# Patient Record
Sex: FEMALE | Employment: FULL TIME | URBAN - METROPOLITAN AREA
[De-identification: names, ages, dates, MRNs, and addresses within clinical notes are randomized per-mention and may not be internally consistent; named-entity substitution may affect disease eponyms.]

---

## 2017-01-02 ENCOUNTER — THERAPY VISIT (OUTPATIENT)
Dept: PHYSICAL THERAPY | Facility: CLINIC | Age: 47
End: 2017-01-02
Payer: COMMERCIAL

## 2017-01-02 DIAGNOSIS — M25.512 LEFT SHOULDER PAIN: Primary | ICD-10-CM

## 2017-01-02 PROCEDURE — 97112 NEUROMUSCULAR REEDUCATION: CPT | Mod: GP | Performed by: PHYSICAL THERAPIST

## 2017-01-02 PROCEDURE — 97161 PT EVAL LOW COMPLEX 20 MIN: CPT | Mod: GP | Performed by: PHYSICAL THERAPIST

## 2017-01-02 PROCEDURE — 97110 THERAPEUTIC EXERCISES: CPT | Mod: GP | Performed by: PHYSICAL THERAPIST

## 2017-01-02 NOTE — PROGRESS NOTES
[facility] Initial Evaluation      Subjective:    Deepa Anderson is a 46 year old female with a left shoulder condition.  Condition occurred with:  Unknown cause.  Condition occurred: for unknown reasons.  This is a new condition  11-15-16 pt had onset of L shoulder pain.    Patient reports pain:  Lateral and in the joint.  Radiates to:  Upper arm.  Pain is described as aching and is intermittent and reported as 3/10.  Associated symptoms:  Catching, loss of motion/stiffness and loss of strength. Pain is the same all the time.  Symptoms are exacerbated by using arm overhead, lifting, certain positions and lying on extremity and relieved by nothing.  Since onset symptoms are unchanged.        General health as reported by patient is good.  Pertinent medical history includes:  Overweight and smoking.  Medical allergies: no.  Other surgeries include:  Other (c section x2, pilonidal cyst, gallbladder).  Current medications:  None as reported by the patient.  Current occupation is .  Patient is working in normal job without restrictions.  Primary job tasks include:  Prolonged sitting.    Barriers include:  None as reported by the patient.    Red flags:  None as reported by the patient.      SHOULDER:    Cervical Screen: normal and painfree with tight B upper traps with some tenderness to palpation    Shoulder:   PROM L PROM R AROM L AROM R MMT L MMT R   Flex   180 180 4/5pain 5/5   Abd   125 185 4/5pain 5/5   Full Can     4/5pain 5/5   Empty Can     NT pain 5/5   IR   T8 T4 5/5 5/5   ER   78 84 4/5 5/5   Ext/IR           Scapulothoraic Rhythm: increased upward rotation on L vs R    Palpation: no TTP    Special tests:   L R   Impingement     Neer's + +   Hawkin's-Jim + neg   Coracoid Impingement     Internal impingement     Labral     Anterior Slide     Woodson's     Crank     Instability     Apprehension (anterior) neg neg   Relocation (anterior)     Anterior Load & Shift      Posterior Load & Shift     Posterior instability (with 90 degrees flex)     Multi-Directional Instability      Sulcus     Biceps      Speed's     Rotator Cuff Tear     Drop Arm neg neg   Belly Press neg neg   Lift off  + neg     GH Mobility  L  R   Posterior glide wnl wnl   Inferior glide wnl wnl   Anterior glide wnl wnl     Plan to address ROM limitations of shoulder and neck, strengthen periscapular and RTC, address posture.                      Objective:    System    Physical Exam    General     ROS    Assessment/Plan:      Patient is a 46 year old female with left side shoulder complaints.    Patient has the following significant findings with corresponding treatment plan.                Diagnosis 1:  L shoulder pain  Pain -  hot/cold therapy, manual therapy, education and home program  Decreased ROM/flexibility - manual therapy, therapeutic exercise, therapeutic activity and home program  Decreased strength - therapeutic exercise, therapeutic activities and home program  Impaired posture - neuro re-education, therapeutic activities and home program    Therapy Evaluation Codes:   1) History comprised of:   Personal factors that impact the plan of care:      None.    Comorbidity factors that impact the plan of care are:      Overweight and Smoking.     Medications impacting care: None.  2) Examination of Body Systems comprised of:   Body structures and functions that impact the plan of care:      Shoulder.   Activity limitations that impact the plan of care are:      Dressing, Lifting and Sleeping.   Clinical presentation characteristics are:    Stable/Uncomplicated.  3) Presentation comprised of:   Presentation scored as Low complexity with uncomplicated characteristics..  4) Decision-Making    Low complexity using standardized patient assessment instrument and/or measureable assessment of functional outcome.  Cumulative Therapy Evaluation is: Low complexity.    Previous and current functional limitations:   (See Goal Flow Sheet for this information)    Short term and Long term goals: (See Goal Flow Sheet for this information)     Communication ability:  Patient appears to be able to clearly communicate and understand verbal and written communication and follow directions correctly.  Treatment Explanation - The following has been discussed with the patient:   RX ordered/plan of care  Anticipated outcomes  Possible risks and side effects  This patient would benefit from PT intervention to resume normal activities.   Rehab potential is good.    Frequency:  1 X week, once daily  Duration:  for 8 weeks  Discharge Plan:  Achieve all LTG.  Independent in home treatment program.  Return to previous functional level by discharge.  Reach maximal therapeutic benefit.    Please refer to the daily flowsheet for treatment today, total treatment time and time spent performing 1:1 timed codes.

## 2017-01-12 ENCOUNTER — THERAPY VISIT (OUTPATIENT)
Dept: PHYSICAL THERAPY | Facility: CLINIC | Age: 47
End: 2017-01-12
Payer: COMMERCIAL

## 2017-01-12 DIAGNOSIS — M25.512 LEFT SHOULDER PAIN: Primary | ICD-10-CM

## 2017-01-12 PROCEDURE — 97110 THERAPEUTIC EXERCISES: CPT | Mod: GP | Performed by: PHYSICAL THERAPIST

## 2017-01-16 ENCOUNTER — THERAPY VISIT (OUTPATIENT)
Dept: PHYSICAL THERAPY | Facility: CLINIC | Age: 47
End: 2017-01-16
Payer: COMMERCIAL

## 2017-01-16 DIAGNOSIS — M25.512 LEFT SHOULDER PAIN: Primary | ICD-10-CM

## 2017-01-16 PROCEDURE — 97110 THERAPEUTIC EXERCISES: CPT | Mod: GP | Performed by: PHYSICAL THERAPIST

## 2017-01-16 PROCEDURE — 97112 NEUROMUSCULAR REEDUCATION: CPT | Mod: GP | Performed by: PHYSICAL THERAPIST

## 2017-01-23 ENCOUNTER — THERAPY VISIT (OUTPATIENT)
Dept: PHYSICAL THERAPY | Facility: CLINIC | Age: 47
End: 2017-01-23
Payer: COMMERCIAL

## 2017-01-23 DIAGNOSIS — M25.512 LEFT SHOULDER PAIN: Primary | ICD-10-CM

## 2017-01-23 PROCEDURE — 97112 NEUROMUSCULAR REEDUCATION: CPT | Mod: GP | Performed by: PHYSICAL THERAPIST

## 2017-01-23 PROCEDURE — 97110 THERAPEUTIC EXERCISES: CPT | Mod: GP | Performed by: PHYSICAL THERAPIST

## 2017-03-24 PROBLEM — M25.512 LEFT SHOULDER PAIN: Status: RESOLVED | Noted: 2017-01-02 | Resolved: 2017-03-24

## 2017-03-24 NOTE — PROGRESS NOTES
"Subjective:    HPI                    Objective:    System    Physical Exam    General     ROS    Assessment/Plan:      DISCHARGE REPORT    Progress reporting period is from 1/2/17 to 1/23/17.     SUBJECTIVE  Subjective: pt reports shoulder is pretty good, was able to lift a \"tote\" overhead weighing approx 15# without pain.   Current Pain level: 2/10   Initial Pain level: 3/10   Changes in function: Yes, see goal flow sheet for change in function   Adverse reactions: None;   ,     The objective findings are from DOS 1/23/17.    OBJECTIVE  Objective: progressed periscapular exercises and added supine cuff exercises      ASSESSMENT/PLAN  Updated problem list and treatment plan: Diagnosis 1:  Shoulder pain   STG/LTGs have been met or progress has been made towards goals:  Yes, is able to reach painfree.  Assessment of Progress: The patient's condition is improving.  Self Management Plans:  Patient is independent in a home treatment program.  [facility] continues to require the following intervention to meet STG and LTG's: PT intervention is no longer required to meet STG/LTG.  We will discharge this patient from PT.    Recommendations:  This patient is ready to be discharged from therapy and continue their home treatment program.    Please refer to the daily flowsheet for treatment today, total treatment time and time spent performing 1:1 timed codes.          "

## 2017-04-12 ENCOUNTER — OFFICE VISIT (OUTPATIENT)
Dept: FAMILY MEDICINE | Facility: CLINIC | Age: 47
End: 2017-04-12
Payer: COMMERCIAL

## 2017-04-12 ENCOUNTER — RADIANT APPOINTMENT (OUTPATIENT)
Dept: ULTRASOUND IMAGING | Facility: CLINIC | Age: 47
End: 2017-04-12
Attending: PHYSICIAN ASSISTANT
Payer: COMMERCIAL

## 2017-04-12 VITALS
OXYGEN SATURATION: 98 % | TEMPERATURE: 97.8 F | SYSTOLIC BLOOD PRESSURE: 136 MMHG | HEART RATE: 82 BPM | DIASTOLIC BLOOD PRESSURE: 88 MMHG

## 2017-04-12 DIAGNOSIS — R22.1 MASS OF LEFT SIDE OF NECK: ICD-10-CM

## 2017-04-12 DIAGNOSIS — R22.1 MASS OF LEFT SIDE OF NECK: Primary | ICD-10-CM

## 2017-04-12 PROCEDURE — 76536 US EXAM OF HEAD AND NECK: CPT

## 2017-04-12 PROCEDURE — 99213 OFFICE O/P EST LOW 20 MIN: CPT | Performed by: PHYSICIAN ASSISTANT

## 2017-04-12 NOTE — PROGRESS NOTES
SUBJECTIVE:                                                    Deepa Anderson is a 46 year old female who presents to clinic today for the following health issues:      Lump on left side neck, notice it yesterday.   It is not painful. She has not been sick with URI symptoms.   Since it has been such a short period of time, she is not sure if there has been change.       Problem list and histories reviewed & adjusted, as indicated.  Additional history: as documented    Patient Active Problem List   Diagnosis     History of dysplastic nevus     Acne vulgaris     Obesity     CARDIOVASCULAR SCREENING; LDL GOAL LESS THAN 160     H. pylori infection     Diarrhea     Tubular adenoma of colon     Abdominal pain, epigastric     Gallstones     S/P laparoscopic cholecystectomy     Tobacco use disorder     Past Surgical History:   Procedure Laterality Date     ABDOMEN SURGERY   &      births     BIOPSY  2015    polyp removed     C  DELIVERY ONLY      x2/'02,'06     CHOLECYSTECTOMY  06/15/2016     COLONOSCOPY  2015     CYSTECTOMY PILONIDAL  '05     SOFT TISSUE SURGERY      Pilonidal cyst surgery     TUBAL LIGATION  '06       Social History   Substance Use Topics     Smoking status: Current Every Day Smoker     Packs/day: 0.25     Years: 25.00     Types: Cigarettes     Smokeless tobacco: Never Used     Alcohol use Yes      Comment: 4-5 drinks per week     Family History   Problem Relation Age of Onset     Arthritis Mother      Lipids Mother      Cardiovascular Father      CANCER No family hx of      Glaucoma No family hx of      Macular Degeneration No family hx of      DIABETES No family hx of      Hypertension Mother      Hypertension Father      CEREBROVASCULAR DISEASE Father      Obesity Mother      Thyroid Disease Mother      Hypertension Brother      Hypertension Brother          Current Outpatient Prescriptions   Medication Sig Dispense Refill     omeprazole (PRILOSEC) 20 MG  capsule Take 1 capsule (20 mg) by mouth daily 90 capsule 1     Multiple Vitamin (MULTI-VITAMIN PO) Take  by mouth. Takes 1 tab daily       ibuprofen (ADVIL,MOTRIN) 200 MG tablet Take 200 mg by mouth every 4 hours as needed.       Allergies   Allergen Reactions     Percodan [Aspirin]        Reviewed and updated as needed this visit by clinical staff  Allergies  Meds       Reviewed and updated as needed this visit by Provider         ROS:  C: NEGATIVE for fever, chills, change in weight  INTEGUMENTARY/SKIN: NEGATIVE for worrisome rashes, moles or lesions  E/M: NEGATIVE for ear, mouth and throat problems  R: NEGATIVE for significant cough or SOB  CV: NEGATIVE for chest pain, palpitations or peripheral edema  GI: NEGATIVE for nausea, abdominal pain, heartburn, or change in bowel habits  MUSCULOSKELETAL: NEGATIVE for significant arthralgias or myalgia  NEURO: NEGATIVE for weakness, dizziness or paresthesias  ENDOCRINE: NEGATIVE for temperature intolerance, skin/hair changes  PSYCHIATRIC: NEGATIVE for changes in mood or affect    OBJECTIVE:                                                    /88 (Cuff Size: Adult Large)  Pulse 82  Temp 97.8  F (36.6  C) (Oral)  SpO2 98%  There is no height or weight on file to calculate BMI.  GENERAL: healthy, alert and no distress  EYES: Eyes grossly normal to inspection, PERRL and conjunctivae and sclerae normal  NECK: no adenopathy, mass in the left anterior neck, along the sternocleidomastoid, nontender, but at least 1 cm in diameter, but borders are difficult to define. and thyroid normal to palpation  MS: no gross musculoskeletal defects noted, no edema  SKIN: no suspicious lesions or rashes  PSYCH: mentation appears normal, affect normal/bright         ASSESSMENT/PLAN:                                                      1. Mass of left side of neck  Get an ultrasound to define.   She will schedule this on her own, contact information was given today.   Notify if any  changes.   - US Head Neck Soft Tissue; Future      Kristen M. Kehr, PA-C  [facility]

## 2017-04-12 NOTE — NURSING NOTE
"Chief Complaint   Patient presents with     Mass     lump on left side of neck        Initial /88 (Cuff Size: Adult Large)  Pulse 82  Temp 97.8  F (36.6  C) (Oral)  SpO2 98% Estimated body mass index is 38.63 kg/(m^2) as calculated from the following:    Height as of 12/20/16: 5' 11\" (1.803 m).    Weight as of 12/20/16: 277 lb (125.6 kg).  Medication Reconciliation: complete    ASAD Cotto MA    "

## 2017-04-12 NOTE — MR AVS SNAPSHOT
After Visit Summary   4/12/2017    Deepa Anderson    MRN: [id number]           Patient Information     Date Of Birth          1970        Visit Information        Provider Department      4/12/2017 11:50 AM Kehr, Kristen M, PA-C [facility]        Today's Diagnoses     Mass of left side of neck    -  1      Care Instructions    Contact [facility] [phone] to schedule appointment for ultrasound            Follow-ups after your visit        Future tests that were ordered for you today     Open Future Orders        Priority Expected Expires Ordered    US Head Neck Soft Tissue Routine 7/11/2017 4/12/2018 4/12/2017            Who to contact     If you have questions or need follow up information about today's clinic visit or your schedule please contact [facility] directly at [phone].  Normal or non-critical lab and imaging results will be communicated to you by MyChart, letter or phone within 4 business days after the clinic has received the results. If you do not hear from us within 7 days, please contact the clinic through MyChart or phone. If you have a critical or abnormal lab result, we will notify you by phone as soon as possible.  Submit refill requests through [facility] or call your pharmacy and they will forward the refill request to us. Please allow 3 business days for your refill to be completed.          Additional Information About Your Visit        MyChart Information     [facility] gives you secure access to your electronic health record. If you see a primary care provider, you can also send messages to your care team and make appointments. If you have questions, please call your primary care clinic.  If you do not have a primary care provider, please call [phone] and they will assist you.        Care EveryWhere ID     This is your Care EveryWhere ID. This could be used by other organizations to access your [facility]  records  [id number]        Your Vitals Were     Pulse Temperature Pulse Oximetry             82 97.8  F (36.6  C) (Oral) 98%          Blood Pressure from Last 3 Encounters:   04/12/17 136/88   12/20/16 112/77   06/29/16 134/83    Weight from Last 3 Encounters:   12/20/16 277 lb (125.6 kg)   06/29/16 272 lb (123.4 kg)   06/28/16 276 lb (125.2 kg)               Primary Care Provider Office Phone # Fax #    Kristen M Kehr, PA-C [phone]       [facility] [address] [facility] [address]        Thank you!     Thank you for choosing [facility]  for your care. Our goal is always to provide you with excellent care. Hearing back from our patients is one way we can continue to improve our services. Please take a few minutes to complete the written survey that you may receive in the mail after your visit with us. Thank you!             Your Updated Medication List - Protect others around you: Learn how to safely use, store and throw away your medicines at www.disposemymeds.org.          This list is accurate as of: 4/12/17 12:28 PM.  Always use your most recent med list.                   Brand Name Dispense Instructions for use    ibuprofen 200 MG tablet    ADVIL/MOTRIN     Take 200 mg by mouth every 4 hours as needed.       MULTI-VITAMIN PO      Take  by mouth. Takes 1 tab daily       omeprazole 20 MG CR capsule    priLOSEC    90 capsule    Take 1 capsule (20 mg) by mouth daily

## 2017-04-13 ENCOUNTER — TELEPHONE (OUTPATIENT)
Dept: FAMILY MEDICINE | Facility: CLINIC | Age: 47
End: 2017-04-13

## 2017-04-13 DIAGNOSIS — R22.1 MASS IN NECK: Primary | ICD-10-CM

## 2017-04-13 NOTE — TELEPHONE ENCOUNTER
Nursing,   Please contact Deepa and let her know that the ultrasound of her neck showed the mass is either a complex cyst or possibly a lymph node.   There was no other enlarged lymph nodes in her neck. I am going to have her make an appointment with the General Surgeon to determine if they would like to biopsy or get an MRI or CT to look at this further to help define. I recommend that she see Dr. Pnadey in General Surgery. I know he has a lot of experience working in the neck because he does thyroid surgeries. Please help her get an appointment. I have the referral in place. Kristen Kehr PA-C            Results for orders placed or performed in visit on 04/12/17   US Head Neck Soft Tissue    Narrative    ULTRASOUND  HEAD AND NECK SOFT TISSUE  4/12/2017 2:43 PM    HISTORY: Mass on left side of anterior neck. Please define. Localized  swelling, mass and lump, neck.    COMPARISONS:  None.    FINDINGS: Limited ultrasound of the left side of the neck demonstrates  a hypoechoic mass or complex cyst measuring 3.5 x 3.1 x 1.5 cm in the  submandibular region corresponding with the patient's palpable  finding. This does not have a fatty hilum nor does it have a central  vascular stalk of blood supply. There is peripheral vascularity. No  other evidence for lymphadenopathy is identified.      Impression    IMPRESSION: Hypoechoic masslike lesion in the left side of the neck  corresponding with palpable finding has a peripheral vascular supply,  does not have a hypoechoic hilum nor does it have a central blood  supply. This could represent a neoplastic enlarged lymph node. Complex  cyst is also possible. Recommend clinical correlation. Tissue sampling  may be necessary. CT or MRI of the neck may be helpful.    CARITO ORTIZ MD

## 2017-04-13 NOTE — TELEPHONE ENCOUNTER
Patient/parent is informed of MD note below, as it is written. Verbalized good understanding.  Phone number is provided to call and schedule an appointment.  Verbalized good understanding.   Shireen Short RN

## 2017-04-17 ENCOUNTER — OFFICE VISIT (OUTPATIENT)
Dept: SURGERY | Facility: CLINIC | Age: 47
End: 2017-04-17
Payer: COMMERCIAL

## 2017-04-17 VITALS
WEIGHT: 283 LBS | SYSTOLIC BLOOD PRESSURE: 134 MMHG | RESPIRATION RATE: 18 BRPM | HEIGHT: 71 IN | HEART RATE: 69 BPM | BODY MASS INDEX: 39.62 KG/M2 | DIASTOLIC BLOOD PRESSURE: 85 MMHG | OXYGEN SATURATION: 99 %

## 2017-04-17 DIAGNOSIS — R59.1 LYMPHADENOPATHY: Primary | ICD-10-CM

## 2017-04-17 PROCEDURE — 99214 OFFICE O/P EST MOD 30 MIN: CPT | Performed by: SURGERY

## 2017-04-17 ASSESSMENT — PAIN SCALES - GENERAL: PAINLEVEL: NO PAIN (0)

## 2017-04-17 NOTE — PROGRESS NOTES
I was asked to see Deepa Anderson regarding left neck mass by Kehr, Kristen M     Deepa Anderson is a 46 year old female with a left neck mass that she noticed recently. This was found on palpation. The patient reports that there has been  growth recently. There are not associated symptoms of dysphagia, dysphonia, dystonia.  An ultrasound on 17 revealed a hypoechoic neck mass. Pt smokes 1/2 pack/day for 30+ years. Alcohol 5-6 beers per week. Previous abnormal mole on hand and back. Non were cancerous. No other symptoms associated with the mass.     Past Medical History:   has a past medical history of Acne; Actinic keratosis (2010); History of atypical/dysplastic nevus; and Obesity.    Past Surgical History:  Past Surgical History:   & : ABDOMEN SURGERY      Comment:  births  2015: BIOPSY      Comment: polyp removed  No date: C  DELIVERY ONLY      Comment: x2/'02,'06  06/15/2016: CHOLECYSTECTOMY  2015: COLONOSCOPY  '05: CYSTECTOMY PILONIDAL  2005: SOFT TISSUE SURGERY      Comment: Pilonidal cyst surgery  '06: TUBAL LIGATION     Social History:  Social History    Marital status:              Spouse name:                       Years of education:                 Number of children:               Occupational History  Occupation          Employer            Comment                                   [employer]      Social History Main Topics    Smoking status: Current Every Day Smoker                                                     Packs/day: 0.25      Years: 25.00          Types: Cigarettes    Smokeless status: Never Used                        Alcohol use: Yes                Comment: 4-5 drinks per week    Drug use: No              Sexual activity: Yes               Partners with: Male       Birth control/protection: None       Comment: Tubes tied     Other Topics            Concern  Parent/sibling w/ CABG* No    Social History Narrative    None on  "file         Family History:  Review of patient's family history indicates:    Arthritis                      Mother                    Lipids                         Mother                    Cardiovascular                 Father                    CANCER                         No family hx of           Glaucoma                       No family hx of           Macular Degeneration           No family hx of           DIABETES                       No family hx of           Hypertension                   Mother                    Hypertension                   Father                    CEREBROVASCULAR DISEASE        Father                    Obesity                        Mother                    Thyroid Disease                Mother                    Hypertension                   Brother                   Hypertension                   Brother                     ROS:  General: negative for fever or chills  Skin: negative except mass noted above  Ears/Nose/Throat: negative for, epistaxis, bleeding gums  Respiratory: No shortness of breath, dyspnea on exertion, cough, or hemoptysis  Cardiovascular: negative for and chest pain  Gastrointestinal: negative for, nausea, vomiting and abdominal pain  Genitourinary: negative for and frequency  Neurologic: negative for and local weakness  Hematologic/Lymphatic/Immunologic: negative for, bleeding disorder and frequent infections  Endocrine: negative for, diabetes, polydipsia and polyuria    PHYSICAL EXAMINATION: A comprehensive head and neck examination was performed. Of note, there was not a palpable enlargement of the thyroid gland. There were no other palpable masses or adenopathy.    Vitals: /85 (BP Location: Right arm, Patient Position: Chair, Cuff Size: Adult Large)  Pulse 69  Resp 18  Ht 1.803 m (5' 11\")  Wt 128.4 kg (283 lb)  SpO2 99%  BMI 39.47 kg/m2  BMI= Body mass index is 39.47 kg/(m^2).  Constitutional: healthy, alert and no distress  Head: " Normocephalic. No masses, lesions, tenderness or abnormalities  Neck: Neck supple. No adenopathy. Thyroid symmetric, normal size, Carotids without bruits.  ENT: Mucous membranes moist, no oral lesions or masses noted, no nasal lesions or masses noted, left cervical lymphadenopathy noted.  Respiratory:  Non-labored respiration  Musculoskeletal: No gross deformities  Neurologic: No focal deficits  Psychiatric: mentation appears normal and affect normal/bright  Hematologic/Lymphatic/Immunologic: No bruising noted    US:  3.5 x 3.1 x 1.5 cm  Hypoechoic masslike lesion in the left side of the neck corresponding with palpable finding has a peripheral vascular supply, does not have a hypoechoic hilum nor does it have a central blood supply. This could represent a neoplastic enlarged lymph node. Complex cyst is also possible. Recommend clinical correlation. Tissue sampling may be necessary. CT or MRI of the neck may be helpful.    IMPRESSION: Left neck mass    RECOMMENDATION:   CT scan  Thyroid US  Discussed surgery vs FNA    Thank you for entrusting me with the care of your patient. If you have any questions or concerns I hope you will contact me.

## 2017-04-17 NOTE — MR AVS SNAPSHOT
"              After Visit Summary   4/17/2017    Deepa Anderson    MRN: [id number]           Patient Information     Date Of Birth          1970        Visit Information        Provider Department      4/17/2017 1:30 PM Ino Pandey, DO [facility]        Today's Diagnoses     Lymphadenopathy    -  1       Follow-ups after your visit        Who to contact     If you have questions or need follow up information about today's clinic visit or your schedule please contact [facility] directly at [phone].  Normal or non-critical lab and imaging results will be communicated to you by [employer], letter or phone within 4 business days after the clinic has received the results. If you do not hear from us within 7 days, please contact the clinic through [employer] Y[employer] or phone. If you have a critical or abnormal lab result, we will notify you by phone as soon as possible.  Submit refill requests through [employer]MLS or call your pharmacy and they will forward the refill request to us. Please allow 3 business days for your refill to be completed.          Additional Information About Your Visit        MyChart Information     Ret[employer] gives you secure access to your electronic health record. If you see a primary care provider, you can also send messages to your care team and make appointments. If you have questions, please call your primary care clinic.  If you do not have a primary care provider, please call [phone] and they will assist you.        Care EveryWhere ID     This is your Care EveryWhere ID. This could be used by other organizations to access your [place] medical records  [id number]        Your Vitals Were     Pulse Respirations Height Pulse Oximetry BMI (Body Mass Index)       69 18 1.803 m (5' 11\") 99% 39.47 kg/m2        Blood Pressure from Last 3 Encounters:   04/17/17 134/85   04/12/17 136/88   12/20/16 112/77    Weight from Last 3 Encounters:   04/17/17 128.4 kg (283 lb) "   12/20/16 125.6 kg (277 lb)   06/29/16 123.4 kg (272 lb)               Primary Care Provider Office Phone # Fax #    Kristen M Kehr, PA-C [phone]       [facility] [address] [facility] [address]        Thank you!     Thank you for choosing [facility]  for your care. Our goal is always to provide you with excellent care. Hearing back from our patients is one way we can continue to improve our services. Please take a few minutes to complete the written survey that you may receive in the mail after your visit with us. Thank you!             Your Updated Medication List - Protect others around you: Learn how to safely use, store and throw away your medicines at www.disposemymeds.org.          This list is accurate as of: 4/17/17 11:59 PM.  Always use your most recent med list.                   Brand Name Dispense Instructions for use    ibuprofen 200 MG tablet    ADVIL/MOTRIN     Take 200 mg by mouth every 4 hours as needed Reported on 4/17/2017       MULTI-VITAMIN PO      Take  by mouth. Takes 1 tab daily       omeprazole 20 MG CR capsule    priLOSEC    90 capsule    Take 1 capsule (20 mg) by mouth daily

## 2017-04-17 NOTE — NURSING NOTE
"Chief Complaint   Patient presents with     Mass     Mass located on left side of neck x 1 week. Referred by Kristen M Kehr.        Initial /85 (BP Location: Right arm, Patient Position: Chair, Cuff Size: Adult Large)  Pulse 69  Resp 18  Ht 1.803 m (5' 11\")  Wt 128.4 kg (283 lb)  SpO2 99%  BMI 39.47 kg/m2 Estimated body mass index is 39.47 kg/(m^2) as calculated from the following:    Height as of this encounter: 1.803 m (5' 11\").    Weight as of this encounter: 128.4 kg (283 lb).  Medication Reconciliation: complete     Everton Nelson CMA        "

## 2017-04-18 ENCOUNTER — RADIANT APPOINTMENT (OUTPATIENT)
Dept: ULTRASOUND IMAGING | Facility: CLINIC | Age: 47
End: 2017-04-18
Attending: SURGERY
Payer: COMMERCIAL

## 2017-04-18 ENCOUNTER — TELEPHONE (OUTPATIENT)
Dept: SURGERY | Facility: CLINIC | Age: 47
End: 2017-04-18

## 2017-04-18 ENCOUNTER — RADIANT APPOINTMENT (OUTPATIENT)
Dept: CT IMAGING | Facility: CLINIC | Age: 47
End: 2017-04-18
Attending: SURGERY
Payer: COMMERCIAL

## 2017-04-18 DIAGNOSIS — R59.1 LYMPHADENOPATHY: ICD-10-CM

## 2017-04-18 PROCEDURE — 76536 US EXAM OF HEAD AND NECK: CPT

## 2017-04-18 PROCEDURE — 70491 CT SOFT TISSUE NECK W/DYE: CPT | Mod: TC

## 2017-04-18 RX ORDER — IOPAMIDOL 755 MG/ML
80 INJECTION, SOLUTION INTRAVASCULAR ONCE
Status: COMPLETED | OUTPATIENT
Start: 2017-04-18 | End: 2017-04-18

## 2017-04-18 RX ADMIN — IOPAMIDOL 80 ML: 755 INJECTION, SOLUTION INTRAVASCULAR at 15:37

## 2017-04-18 NOTE — TELEPHONE ENCOUNTER
Hi,  I am the Sonographer at [facility]. I just did an u/s on Nada's neck.  In notes it says thyroid feels normal.  Did you want me to scan thyroid or palpable neck mass again?? Patient coming this afternoon.    Thanks,  [facility] [place]  [phone]

## 2017-04-20 ENCOUNTER — MYC MEDICAL ADVICE (OUTPATIENT)
Dept: SURGERY | Facility: CLINIC | Age: 47
End: 2017-04-20

## 2017-04-21 NOTE — TELEPHONE ENCOUNTER
Spoke with patient did not give results I called Dr. Pandey and left him a message to call patient before the weekend. Linda Donato Cma

## 2017-05-16 ENCOUNTER — SURGERY (OUTPATIENT)
Age: 47
End: 2017-05-16

## 2017-05-16 ENCOUNTER — HOSPITAL ENCOUNTER (OUTPATIENT)
Facility: AMBULATORY SURGERY CENTER | Age: 47
Discharge: HOME | End: 2017-05-16
Attending: SURGERY | Admitting: SURGERY
Payer: COMMERCIAL

## 2017-05-16 VITALS
HEIGHT: 71 IN | WEIGHT: 283 LBS | OXYGEN SATURATION: 97 % | SYSTOLIC BLOOD PRESSURE: 112 MMHG | TEMPERATURE: 97.9 F | DIASTOLIC BLOOD PRESSURE: 69 MMHG | RESPIRATION RATE: 16 BRPM | BODY MASS INDEX: 39.62 KG/M2

## 2017-05-16 LAB — UPPER GI ENDOSCOPY: NORMAL

## 2017-05-16 PROCEDURE — 43235 EGD DIAGNOSTIC BRUSH WASH: CPT

## 2017-05-16 PROCEDURE — 43235 EGD DIAGNOSTIC BRUSH WASH: CPT | Performed by: SURGERY

## 2017-05-16 PROCEDURE — G8907 PT DOC NO EVENTS ON DISCHARG: HCPCS

## 2017-05-16 PROCEDURE — G8918 PT W/O PREOP ORDER IV AB PRO: HCPCS

## 2017-05-16 RX ORDER — LIDOCAINE 40 MG/G
CREAM TOPICAL
Status: DISCONTINUED | OUTPATIENT
Start: 2017-05-16 | End: 2017-05-17 | Stop reason: HOSPADM

## 2017-05-16 RX ORDER — ONDANSETRON 2 MG/ML
4 INJECTION INTRAMUSCULAR; INTRAVENOUS
Status: DISCONTINUED | OUTPATIENT
Start: 2017-05-16 | End: 2017-05-17 | Stop reason: HOSPADM

## 2017-05-16 RX ORDER — FENTANYL CITRATE 50 UG/ML
INJECTION, SOLUTION INTRAMUSCULAR; INTRAVENOUS PRN
Status: DISCONTINUED | OUTPATIENT
Start: 2017-05-16 | End: 2017-05-16 | Stop reason: HOSPADM

## 2017-05-16 RX ADMIN — FENTANYL CITRATE 50 MCG: 50 INJECTION, SOLUTION INTRAMUSCULAR; INTRAVENOUS at 10:35

## 2017-05-16 RX ADMIN — FENTANYL CITRATE 100 MCG: 50 INJECTION, SOLUTION INTRAMUSCULAR; INTRAVENOUS at 10:33

## 2017-05-22 ENCOUNTER — MYC MEDICAL ADVICE (OUTPATIENT)
Dept: SURGERY | Facility: CLINIC | Age: 47
End: 2017-05-22

## 2017-05-23 NOTE — TELEPHONE ENCOUNTER
Called left message that we would be contacting her about making an appointment. She will need an office visit to discuss surgery.

## 2017-06-01 ENCOUNTER — OFFICE VISIT (OUTPATIENT)
Dept: SURGERY | Facility: CLINIC | Age: 47
End: 2017-06-01
Payer: COMMERCIAL

## 2017-06-01 VITALS
DIASTOLIC BLOOD PRESSURE: 75 MMHG | HEART RATE: 68 BPM | SYSTOLIC BLOOD PRESSURE: 122 MMHG | HEIGHT: 71 IN | WEIGHT: 284 LBS | BODY MASS INDEX: 39.76 KG/M2

## 2017-06-01 DIAGNOSIS — R22.1 MASS OF NECK: Primary | ICD-10-CM

## 2017-06-01 PROCEDURE — 99214 OFFICE O/P EST MOD 30 MIN: CPT | Performed by: SURGERY

## 2017-06-01 NOTE — NURSING NOTE
"Chief Complaint   Patient presents with     Consult     surgery        Initial /75  Pulse 68  Ht 5' 11\" (1.803 m)  Wt 284 lb (128.8 kg)  LMP 03/31/2017  BMI 39.61 kg/m2 Estimated body mass index is 39.61 kg/(m^2) as calculated from the following:    Height as of this encounter: 5' 11\" (1.803 m).    Weight as of this encounter: 284 lb (128.8 kg).  Medication Reconciliation: complete   Linda Donato Cma      "

## 2017-06-01 NOTE — Clinical Note
I had the pleasure of seeing Deepa Anderson in the clinic to follow up her upper endoscopy. She is scheduled for surgery with Dr Morton and myself. She will get a direct laryngoscopy at the time of surgery just to make sure the nodule is benign. Both Dr Morton and I believe this to be a benign cyst.   Thanks for allowing me the chance to participate in her care.  Sincerely,  Ino Pandey  General Surgery  Head and Neck/Endocrine Surgery

## 2017-06-06 ENCOUNTER — OFFICE VISIT (OUTPATIENT)
Dept: OTOLARYNGOLOGY | Facility: CLINIC | Age: 47
End: 2017-06-06
Payer: COMMERCIAL

## 2017-06-06 VITALS — RESPIRATION RATE: 18 BRPM | HEIGHT: 71 IN | BODY MASS INDEX: 39.76 KG/M2 | WEIGHT: 284 LBS

## 2017-06-06 DIAGNOSIS — R22.1 MASS OF NECK: Primary | ICD-10-CM

## 2017-06-06 PROCEDURE — 99204 OFFICE O/P NEW MOD 45 MIN: CPT | Performed by: OTOLARYNGOLOGY

## 2017-06-06 ASSESSMENT — PAIN SCALES - GENERAL: PAINLEVEL: NO PAIN (0)

## 2017-06-06 NOTE — PROGRESS NOTES
I am seeing this patient in consultation for a left neck mass and possible pyriform sinus lesion at the request of the provider Dr. Rashid Pandey.    Chief Complaint - Neck mass    History of Present Illness - Deepa Anderson is a 46 year old female with a left neck mass. It has been present for 2 months. She noticed it one day while feeling this. The patient notes no tenderness or pain. No changes in size. No skin cancer or any cancer. However, had a precancerous skin nodule on hand and polyp in colon. No dysphagia, odynphagia, or hoarseness. No obvious signs of infection including fluctuating size, redness, or purulent drainage. The patient does smoke (1/2 ppd). Drinks some alcohol. U/S neck and thyroid as well as CT show a left level 2 neck mass, 3.5 x 3.1 x 1.5 cm on U/S and 2.9 x 1.8 x 3.7 cm diameter on CT.    Past Medical History -   Patient Active Problem List   Diagnosis     History of dysplastic nevus     Acne vulgaris     Obesity     CARDIOVASCULAR SCREENING; LDL GOAL LESS THAN 160     H. pylori infection     Diarrhea     Tubular adenoma of colon     Abdominal pain, epigastric     Gallstones     S/P laparoscopic cholecystectomy     Tobacco use disorder       Current Medications -   Current Outpatient Prescriptions:      omeprazole (PRILOSEC) 20 MG capsule, Take 1 capsule (20 mg) by mouth daily, Disp: 90 capsule, Rfl: 1     Multiple Vitamin (MULTI-VITAMIN PO), Take  by mouth. Takes 1 tab daily, Disp: , Rfl:      ibuprofen (ADVIL,MOTRIN) 200 MG tablet, Take 200 mg by mouth every 4 hours as needed Reported on 4/17/2017, Disp: , Rfl:     Allergies -   Allergies   Allergen Reactions     Percodan [Aspirin]        Social History -   Social History     Social History     Marital status:      Spouse name: N/A     Number of children: N/A     Years of education: N/A     Occupational History      [employer]     Social History Main Topics     Smoking status: Current Every Day Smoker     Packs/day: 0.25  "    Years: 25.00     Types: Cigarettes     Smokeless tobacco: Never Used     Alcohol use Yes      Comment: 4-5 drinks per week     Drug use: No     Sexual activity: Yes     Partners: Male     Birth control/ protection: None      Comment: Tubes tied 2006     Other Topics Concern     Parent/Sibling W/ Cabg, Mi Or Angioplasty Before 65f 55m? No     Social History Narrative       Family History -   Family History   Problem Relation Age of Onset     Arthritis Mother      Lipids Mother      Hypertension Mother      Obesity Mother      Thyroid Disease Mother      Cardiovascular Father      Hypertension Father      CEREBROVASCULAR DISEASE Father      Hypertension Brother      Hypertension Brother      CANCER No family hx of      Glaucoma No family hx of      Macular Degeneration No family hx of      DIABETES No family hx of        Review of Systems - As per HPI and PMHx, otherwise 7 system review of the head and neck negative.    Physical Exam  Resp 18  Ht 1.803 m (5' 11\")  Wt 128.8 kg (284 lb)  LMP 03/31/2017  BMI 39.61 kg/m2  General - The patient is in no distress.  Alert and oriented x3, answers questions and cooperates with examination appropriately.   Voice and Breathing - The patient was breathing comfortably without the use of accessory muscles. There was no wheezing, stridor, or stertor.  The patients voice was clear and strong.  Eyes - Extraocular movements intact. Sclera were not icteric or injected, conjunctiva were pink and moist.  Neurologic - Cranial nerves II-XII are grossly intact. Specifically, the facial nerve is intact, House-Brackmann grade 1 of 6.   Mouth - Examination of the oral cavity showed pink, healthy oral mucosa. No lesions or ulcerations noted.  The tongue was mobile and protrudes midline, no lesions.  Oropharynx - The walls of the oropharynx were smooth, symmetric, and had no lesions or ulcerations.  The tonsils were without masses or ulcerations. The uvula was midline and the palate " raised symmetrically. Tonsils were 1+, no masses or mucosal lesions  Neck - Palpation of the left neck reveals a 2.5-3 cm mass in level 2, just posterior to the submandibular gland. It is mobile, but firm. No overlying skin changes. No tenderness. No fluctuance. The other occipital, submental, submandibular, internal jugular chain, and supraclavicular chains did not demonstrate any abnormal lymph nodes or masses. No parotid masses. Palpation of the thyroid was soft and smooth, with no nodules or goiter appreciated.  The trachea was midline.  Skin - close examination of the scalp and skin on the side of the mass reveals no suspicious lesions or skin cancers. No induration on palpation.      A/P - Deepa Anderson is a 46 year old female with a left cystic neck mass. 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 She also has a nodule in the left pyriform on EGD. We discussed the options of direct laryngoscopy (DL) with biopsy and left neck mass removal at the same procedure versus DL with biopsy and waiting for path with removal of the mass at another procedure if the DL biopsy is benign. Ultimately we agreed on doing both the DL and mass excision at the same procedure to save a general anesthetic and expedite the process. She understands if this is a cancer she will need additional therapy possibly in the form of more surgery, chemotherapy, and/or radiation.     We discussed surgery, the risks, benefits, and alternatives including, but not limited to: sore throat, hemoptysis, infection, bleeding, temporary or permanent neck or facial numbness, facial weakness, tongue numbness or weakness, dysphagia, and dysarthria, shoulder weakness, incisional scar, wound depression or concavity of surgical bed. Also general anesthesia and the need for additional procedures or treatment was discussed. The patient agrees with proceeding. I will have the schedulers call the patient.       Rayray Morton,  MD  Otolaryngology  [facility]

## 2017-06-06 NOTE — NURSING NOTE
"Chief Complaint   Patient presents with     Mass     Left neck. Discuss FNA vs Surgery       Initial Resp 18  Ht 1.803 m (5' 11\")  Wt 128.8 kg (284 lb)  LMP 03/31/2017  BMI 39.61 kg/m2 Estimated body mass index is 39.61 kg/(m^2) as calculated from the following:    Height as of this encounter: 1.803 m (5' 11\").    Weight as of this encounter: 128.8 kg (284 lb).  Medication Reconciliation: complete     Sonal Tapia MA    "

## 2017-06-06 NOTE — MR AVS SNAPSHOT
After Visit Summary   6/6/2017    Deepa Anderson    MRN: [id number]           Patient Information     Date Of Birth          1970        Visit Information        Provider Department      6/6/2017 11:15 AM Rayray Morton MD [facility]        Today's Diagnoses     Mass of neck    -  1      Care Instructions    General Scheduling Information  To schedule your CT/MRI scan, please contact [place] [facility] at [phone] [address] [place] [address]    To schedule your Surgery, please contact our Specialty Schedulers at [phone]    ENT Clinic Locations Clinic Hours Telephone Number     [facility]  [address]  [place] [address]   Tuesday:       8:00am -- 4:00pm    Wednesday:  8:00am - 4:00pm   To schedule an appointment with   Dr. Morton,   please contact our   Specialty Scheduling Department at:     [phone]       [facility]  [address]   [place] [address]   Friday:          8:00am - 4:00pm         Urgent Care Locations Clinic Hours Telephone Numbers     [facility] [place]  [address] [place] [address]     Monday-Friday:     11:00pm - 9:00pm    Saturday-Sunday:  9:00am - 5:00pm   [phone]     [facility] [place]  [address]   [place] [address]     Monday-Friday:      5:00pm - 9:00pm     Saturday-Sunday:  9:00am - 5:00pm   [phone]               Follow-ups after your visit        Who to contact     If you have questions or need follow up information about today's clinic visit or your schedule please contact [facility] directly at [phone].  Normal or non-critical lab and imaging results will be communicated to you by MyChart, letter or phone within 4 business days after the clinic has received the results. If you do not hear from us within 7 days, please contact the clinic through MyChart or phone. If you have a critical or abnormal lab result, we will notify you by phone as soon  "as possible.  Submit refill requests through [employer] or call your pharmacy and they will forward the refill request to us. Please allow 3 business days for your refill to be completed.          Additional Information About Your Visit        Incoming Mediahart Information     [employer] gives you secure access to your electronic health record. If you see a primary care provider, you can also send messages to your care team and make appointments. If you have questions, please call your primary care clinic.  If you do not have a primary care provider, please call [phone] and they will assist you.        Care EveryWhere ID     This is your Care EveryWhere ID. This could be used by other organizations to access your [place] medical records  [id number]        Your Vitals Were     Respirations Height Last Period BMI (Body Mass Index)          18 1.803 m (5' 11\") 03/31/2017 39.61 kg/m2         Blood Pressure from Last 3 Encounters:   06/01/17 122/75   05/16/17 112/69   04/17/17 134/85    Weight from Last 3 Encounters:   06/06/17 128.8 kg (284 lb)   06/01/17 128.8 kg (284 lb)   05/10/17 128.4 kg (283 lb)              Today, you had the following     No orders found for display       Primary Care Provider Office Phone # Fax #    Kristen M Kehr, PA-C [phone]       [facility] [address] [facility] [address]        Thank you!     Thank you for choosing [facility]  for your care. Our goal is always to provide you with excellent care. Hearing back from our patients is one way we can continue to improve our services. Please take a few minutes to complete the written survey that you may receive in the mail after your visit with us. Thank you!             Your Updated Medication List - Protect others around you: Learn how to safely use, store and throw away your medicines at www.disposemymeds.org.          This list is accurate as of: 6/6/17 12:20 PM.  Always use your most recent med " list.                   Brand Name Dispense Instructions for use    ibuprofen 200 MG tablet    ADVIL/MOTRIN     Take 200 mg by mouth every 4 hours as needed Reported on 4/17/2017       MULTI-VITAMIN PO      Take  by mouth. Takes 1 tab daily       omeprazole 20 MG CR capsule    priLOSEC    90 capsule    Take 1 capsule (20 mg) by mouth daily

## 2017-06-06 NOTE — PATIENT INSTRUCTIONS
General Scheduling Information  To schedule your CT/MRI scan, please contact [place] [facility] at [phone]   [address] [place] [address]    To schedule your Surgery, please contact our Specialty Schedulers at [phone]    ENT Clinic Locations Clinic Hours Telephone Number     [facility]  [address] [place] [address]   Tuesday:       8:00am -- 4:00pm    Wednesday:  8:00am - 4:00pm   To schedule an appointment with   Dr. Morton,   please contact our   Specialty Scheduling Department at:     [phone]       [facility]  [address] [place] [address]   Friday:          8:00am - 4:00pm         Urgent Care Locations Clinic Hours Telephone Numbers     [facility]  [address] [place] [address]     Monday-Friday:     11:00pm - 9:00pm    Saturday-Sunday:  9:00am - 5:00pm   [phone]     [facility]  [address] [place] [address]     Monday-Friday:      5:00pm - 9:00pm     Saturday-Sunday:  9:00am - 5:00pm   [phone]

## 2017-06-13 NOTE — PROGRESS NOTES
HPI:  Patient is a 46 year old female here to follow up the results of her upper endoscopy. On endoscopy a nodule was noted in the left pyriform sinus. + alcohol and smoking    Patient does not have a family history of throat cancer problems    Review Of Systems  Unchanged from previous visit    General: negative for fever or chills  Skin: negative except mass noted above  Ears/Nose/Throat: negative for, epistaxis, bleeding gums  Respiratory: No shortness of breath, dyspnea on exertion, cough, or hemoptysis  Cardiovascular: negative for and chest pain  Gastrointestinal: negative for, nausea, vomiting and abdominal pain  Genitourinary: negative for and frequency  Neurologic: negative for and local weakness  Hematologic/Lymphatic/Immunologic: negative for, bleeding disorder and frequent infections  Endocrine: negative for, diabetes, polydipsia and polyuria    Past Medical History:   Diagnosis Date     Acne      Actinic keratosis 2010    porokeratosis R hand     History of atypical/dysplastic nevus     back and R thigh.     Obesity        Past Surgical History:   Procedure Laterality Date     ABDOMEN SURGERY   &      births     BIOPSY  2015    polyp removed     C  DELIVERY ONLY      x2/'02,'06     CHOLECYSTECTOMY  06/15/2016     COLONOSCOPY  2015     COMBINED ESOPHAGOSCOPY, GASTROSCOPY, DUODENOSCOPY (EGD) WITH CO2 INSUFFLATION N/A 2017    Procedure: COMBINED ESOPHAGOSCOPY, GASTROSCOPY, DUODENOSCOPY (EGD) WITH CO2 INSUFFLATION;  EGD-MASS OF NECK/ DELL;  Surgeon: Sohan Renee MD;  Location: [facility] OR     CYSTECTOMY PILONIDAL  '05     SOFT TISSUE SURGERY      Pilonidal cyst surgery     TUBAL LIGATION  '06       Social History     Social History     Marital status:      Spouse name: N/A     Number of children: N/A     Years of education: N/A     Occupational History      [employer]     Social History Main Topics     Smoking status: Current Every Day  "Smoker     Packs/day: 0.25     Years: 25.00     Types: Cigarettes     Smokeless tobacco: Never Used     Alcohol use Yes      Comment: 4-5 drinks per week     Drug use: No     Sexual activity: Yes     Partners: Male     Birth control/ protection: None      Comment: Tubes tied 2006     Other Topics Concern     Parent/Sibling W/ Cabg, Mi Or Angioplasty Before 65f 55m? No     Social History Narrative       Current Outpatient Prescriptions   Medication Sig Dispense Refill     omeprazole (PRILOSEC) 20 MG capsule Take 1 capsule (20 mg) by mouth daily 90 capsule 1     Multiple Vitamin (MULTI-VITAMIN PO) Take  by mouth. Takes 1 tab daily       ibuprofen (ADVIL,MOTRIN) 200 MG tablet Take 200 mg by mouth every 4 hours as needed Reported on 4/17/2017         Medications and history reviewed    Physical exam:  Vitals: /75  Pulse 68  Ht 5' 11\" (1.803 m)  Wt 284 lb (128.8 kg)  LMP 03/31/2017  BMI 39.61 kg/m2  BMI= Body mass index is 39.61 kg/(m^2).    Constitutional: healthy, alert and no distress  Head: Normocephalic. No masses, lesions, tenderness or abnormalities  Neck: Neck supple. No adenopathy. Thyroid symmetric, normal size, Carotids without bruits.  ENT: Mucous membranes moist, no oral lesions or masses noted, no nasal lesions or masses noted, left cervical lymphadenopathy noted.  Respiratory:  Non-labored respiration  Musculoskeletal: No gross deformities  Neurologic: No focal deficits  Psychiatric: mentation appears normal and affect normal/bright  Hematologic/Lymphatic/Immunologic: No bruising noted  Skin: No lesions, rashes, erythema or jaundice noted    Assessment:     ICD-10-CM    1. Mass of neck R22.1 OTOLARYNGOLOGY REFERRAL     Plan: Referral to ENT for evaluation for direct laryngoscopy and biopsy of the mass    Ino Pandey, DO    "

## 2017-06-21 ENCOUNTER — OFFICE VISIT (OUTPATIENT)
Dept: FAMILY MEDICINE | Facility: CLINIC | Age: 47
End: 2017-06-21
Payer: COMMERCIAL

## 2017-06-21 VITALS
WEIGHT: 280 LBS | BODY MASS INDEX: 39.05 KG/M2 | OXYGEN SATURATION: 98 % | HEART RATE: 75 BPM | DIASTOLIC BLOOD PRESSURE: 82 MMHG | TEMPERATURE: 98.3 F | SYSTOLIC BLOOD PRESSURE: 127 MMHG

## 2017-06-21 DIAGNOSIS — Z01.818 PREOP GENERAL PHYSICAL EXAM: Primary | ICD-10-CM

## 2017-06-21 DIAGNOSIS — R22.1 MASS OF NECK: ICD-10-CM

## 2017-06-21 PROCEDURE — 99214 OFFICE O/P EST MOD 30 MIN: CPT | Performed by: PHYSICIAN ASSISTANT

## 2017-06-21 NOTE — NURSING NOTE
"Chief Complaint   Patient presents with     Pre-Op Exam       Initial /82 (Cuff Size: Adult Large)  Pulse 75  Temp 98.3  F (36.8  C) (Oral)  Wt 280 lb (127 kg)  SpO2 98%  BMI 39.05 kg/m2 Estimated body mass index is 39.05 kg/(m^2) as calculated from the following:    Height as of 6/6/17: 5' 11\" (1.803 m).    Weight as of this encounter: 280 lb (127 kg).  Medication Reconciliation: complete    ASAD Cotto MA    "

## 2017-06-21 NOTE — MR AVS SNAPSHOT
After Visit Summary   6/21/2017    Deepa Anderson    MRN: [id number]           Patient Information     Date Of Birth          1970        Visit Information        Provider Department      6/21/2017 8:30 AM Kehr, Kristen M, PA-C [facility]        Today's Diagnoses     Preop general physical exam    -  1    Mass of neck          Care Instructions      Before Your Surgery      Call your surgeon if there is any change in your health. This includes signs of a cold or flu (such as a sore throat, runny nose, cough, rash or fever).    Do not smoke, drink alcohol or take over the counter medicine (unless your surgeon or primary care doctor tells you to) for the 24 hours before and after surgery.    If you take prescribed drugs: Follow your doctor s orders about which medicines to take and which to stop until after surgery.    Eating and drinking prior to surgery: follow the instructions from your surgeon    Take a shower or bath the night before surgery. Use the soap your surgeon gave you to gently clean your skin. If you do not have soap from your surgeon, use your regular soap. Do not shave or scrub the surgery site.  Wear clean pajamas and have clean sheets on your bed.           Follow-ups after your visit        Your next 10 appointments already scheduled     Jun 29, 2017   Procedure with Rayray Morton MD   [facility] (--)    [address]  [facility] [place] [address]   [phone]            Jun 30, 2017  8:00 AM CDT   Post Op with Rayray Morton MD   [facility]    [address] [facility] [address] [phone]              Who to contact     If you have questions or need follow up information about today's clinic visit or your schedule please contact [facility] directly at [phone].  Normal or non-critical lab and imaging results will be communicated to you by MyChart, letter or phone  within 4 business days after the clinic has received the results. If you do not hear from us within 7 days, please contact the clinic through [employer] or phone. If you have a critical or abnormal lab result, we will notify you by phone as soon as possible.  Submit refill requests through [employer] or call your pharmacy and they will forward the refill request to us. Please allow 3 business days for your refill to be completed.          Additional Information About Your Visit        [employer] Information     [employer] gives you secure access to your electronic health record. If you see a primary care provider, you can also send messages to your care team and make appointments. If you have questions, please call your primary care clinic.  If you do not have a primary care provider, please call [phone] and they will assist you.        Care EveryWhere ID     This is your Care EveryWhere ID. This could be used by other organizations to access your [facility] medical records  [id number]        Your Vitals Were     Pulse Temperature Pulse Oximetry BMI (Body Mass Index)          75 98.3  F (36.8  C) (Oral) 98% 39.05 kg/m2         Blood Pressure from Last 3 Encounters:   06/21/17 127/82   06/01/17 122/75   05/16/17 112/69    Weight from Last 3 Encounters:   06/21/17 280 lb (127 kg)   06/06/17 284 lb (128.8 kg)   06/01/17 284 lb (128.8 kg)              Today, you had the following     No orders found for display       Primary Care Provider Office Phone # Fax #    Kristen M Kehr, PA-C [phone]       [facility] [address] [facility] [address]        Equal Access to Services     [facility] LONI : Hadii aad ku hadasho Soomaali, waaxda luqadaha, qaybta kaalmada brigida, cam calderon. So [facility] [phone].    ATENCIÓN: Si habla español, tiene a segovia disposición servicios gratuitos de asistencia lingüística. Llame al [phone].    We comply with applicable federal civil  rights laws and [place] laws. We do not discriminate on the basis of race, color, national origin, age, disability sex, sexual orientation or gender identity.            Thank you!     Thank you for choosing [facility]  [facility]. Our goal is always to provide you with excellent care. Hearing back from our patients is one way we can continue to improve our services. Please take a few minutes to complete the written survey that you may receive in the mail after your visit with us. Thank you!             Your Updated Medication List - Protect others around you: Learn how to safely use, store and throw away your medicines at www.disposemymeds.org.          This list is accurate as of: 6/21/17 11:59 PM.  Always use your most recent med list.                   Brand Name Dispense Instructions for use Diagnosis    ibuprofen 200 MG tablet    ADVIL/MOTRIN     Take 200 mg by mouth every 4 hours as needed Reported on 4/17/2017        MULTI-VITAMIN PO      Take  by mouth. Takes 1 tab daily        omeprazole 20 MG CR capsule    priLOSEC    90 capsule    Take 1 capsule (20 mg) by mouth daily    Abdominal pain, epigastric

## 2017-06-21 NOTE — PROGRESS NOTES
[facility]  [address] [facility] [address]  [phone]  Dept: [phone]    PRE-OP EVALUATION:  Today's date: 2017    Deepa Anderson (: 1970) presents for pre-operative evaluation assessment as requested by dre Billings.  She requires evaluation and anesthesia risk assessment prior to undergoing surgery/procedure for treatment of  .  Proposed procedure: Direct laryngoscopy with biopsy and excision of left neck mass    Date of Surgery/ Procedure: 17  [facility]  Fax number for surgical facility:   Primary Physician: Kehr, Kristen M  Type of Anesthesia Anticipated: to be determined    Patient has a Health Care Directive or Living Will:  NO    Preop Questions 2017   1.  Do you have a history of heart attack, stroke, stent, bypass or surgery on an artery in the head, neck, heart or legs? No   2.  Do you ever have any pain or discomfort in your chest? No   3.  Do you have a history of  Heart Failure? No   4.   Are you troubled by shortness of breath when:  walking on a level surface, or up a slight hill, or at night? No   5.  Do you currently have a cold, bronchitis or other respiratory infection? No   6.  Do you have a cough, shortness of breath, or wheezing? No   7.  Do you sometimes get pains in the calves of your legs when you walk? No   8. Do you or anyone in your family have previous history of blood clots? YES - family-older brother and paternal aunt   9.  Do you or does anyone in your family have a serious bleeding problem such as prolonged bleeding following surgeries or cuts? No   10. Have you ever had problems with anemia or been told to take iron pills? No   11. Have you had any abnormal blood loss such as black, tarry or bloody stools, or abnormal vaginal bleeding? No   12. Have you ever had a blood transfusion? No   13. Have you or any of your relatives ever had problems with  anesthesia? No   14. Do you have sleep apnea, excessive snoring or daytime drowsiness? No   15. Do you have any prosthetic heart valves? No   16. Do you have prosthetic joints? No   17. Is there any chance that you may be pregnant? No         HPI:                                                      Brief HPI related to upcoming procedure: Deepa has a neck mass and will be having it removed.         MEDICAL HISTORY:                                                      Patient Active Problem List    Diagnosis Date Noted     Tobacco use disorder 2016     Priority: Medium     S/P laparoscopic cholecystectomy 2016     Priority: Medium     Gallstones 2016     Priority: Medium     Abdominal pain, epigastric 2016     Priority: Medium     Tubular adenoma of colon 10/12/2015     Priority: Medium     Colonoscopy , repeat in 3 years.        Diarrhea 2015     Priority: Medium     H. pylori infection 2014     Priority: Medium     CARDIOVASCULAR SCREENING; LDL GOAL LESS THAN 160 2013     Priority: Medium     Obesity 10/25/2012     Priority: Medium     History of dysplastic nevus 2012     Priority: Medium     Acne vulgaris 2012     Priority: Medium      Past Medical History:   Diagnosis Date     Acne      Actinic keratosis 2010    porokeratosis R hand     History of atypical/dysplastic nevus     back and R thigh.     Obesity      Past Surgical History:   Procedure Laterality Date     ABDOMEN SURGERY   &      births     BIOPSY  2015    polyp removed     C  DELIVERY ONLY      x2/'02,'06     CHOLECYSTECTOMY  06/15/2016     COLONOSCOPY  2015     COMBINED ESOPHAGOSCOPY, GASTROSCOPY, DUODENOSCOPY (EGD) WITH CO2 INSUFFLATION N/A 2017    Procedure: COMBINED ESOPHAGOSCOPY, GASTROSCOPY, DUODENOSCOPY (EGD) WITH CO2 INSUFFLATION;  EGD-MASS OF NECK/ DELL;  Surgeon: Sohan Renee MD;  [facility]     [facility]      SOFT TISSUE SURGERY  2005    Pilonidal cyst surgery     TUBAL LIGATION  '06     Current Outpatient Prescriptions   Medication Sig Dispense Refill     omeprazole (PRILOSEC) 20 MG capsule Take 1 capsule (20 mg) by mouth daily 90 capsule 1     Multiple Vitamin (MULTI-VITAMIN PO) Take  by mouth. Takes 1 tab daily       ibuprofen (ADVIL,MOTRIN) 200 MG tablet Take 200 mg by mouth every 4 hours as needed Reported on 4/17/2017       OTC products: None, except as noted above    Allergies   Allergen Reactions     Percodan [Aspirin]       Latex Allergy: NO    Social History   Substance Use Topics     Smoking status: Current Every Day Smoker     Packs/day: 0.50     Years: 25.00     Types: Cigarettes     Smokeless tobacco: Never Used     Alcohol use Yes      Comment: 4-5 drinks per week     History   Drug Use No       REVIEW OF SYSTEMS:                                                    C: NEGATIVE for fever, chills, change in weight  I: NEGATIVE for worrisome rashes, moles or lesions  E: NEGATIVE for vision changes or irritation  E/M: NEGATIVE for ear, mouth and throat problems  R: NEGATIVE for significant cough or SOB  CV: NEGATIVE for chest pain, palpitations or peripheral edema  GI: NEGATIVE for nausea, abdominal pain, heartburn, or change in bowel habits  : NEGATIVE for frequency, dysuria, or hematuria  M: NEGATIVE for significant arthralgias or myalgia  N: NEGATIVE for weakness, dizziness or paresthesias  E: NEGATIVE for temperature intolerance, skin/hair changes  H: NEGATIVE for bleeding problems  P: NEGATIVE for changes in mood or affect    EXAM:                                                    /82 (Cuff Size: Adult Large)  Pulse 75  Temp 98.3  F (36.8  C) (Oral)  Wt 280 lb (127 kg)  SpO2 98%  BMI 39.05 kg/m2    GENERAL APPEARANCE: healthy, alert and no distress     EYES: EOMI, PERRL     HENT: ear canals and TM's normal and nose and mouth without ulcers or lesions     NECK: no adenopathy, no asymmetry,  masses, or scars and thyroid normal to palpation     RESP: lungs clear to auscultation - no rales, rhonchi or wheezes     CV: regular rates and rhythm, normal S1 S2, no S3 or S4 and no murmur, click or rub     ABDOMEN:  soft, nontender, no HSM or masses and bowel sounds normal     MS: extremities normal- no gross deformities noted, no evidence of inflammation in joints, FROM in all extremities.     SKIN: no suspicious lesions or rashes     NEURO: Normal strength and tone, sensory exam grossly normal, mentation intact and speech normal     PSYCH: mentation appears normal. and affect normal/bright    DIAGNOSTICS:                                                        Recent Labs   Lab Test  06/29/16   0904  05/26/15   0930   HGB  14.1  13.5   PLT  234  184   NA  137  142   POTASSIUM  4.4  3.8   CR  0.69  0.78        IMPRESSION:                                                    Reason for surgery/procedure: Neck Mass  Diagnosis/reason for consult: preoperative clearance    The proposed surgical procedure is considered LOW risk.    REVISED CARDIAC RISK INDEX  The patient has the following serious cardiovascular risks for perioperative complications such as (MI, PE, VFib and 3  AV Block):  No serious cardiac risks  INTERPRETATION: 0 risks: Class I (very low risk - 0.4% complication rate)    The patient has the following additional risks for perioperative complications:  No identified additional risks      ICD-10-CM    1. Preop general physical exam Z01.818    2. Mass of neck R22.1        RECOMMENDATIONS:                                                        --Patient is to take all scheduled medications on the day of surgery EXCEPT for modifications listed below.  No NSAIDS or ASA    APPROVAL GIVEN to proceed with proposed procedure, without further diagnostic evaluation       Signed Electronically by: Kristen M. Kehr, PA-C  Chart reviewed, agree with evaluation and recommendations above.  Jennifer Phelan M.D.       Copy  of this evaluation report is provided to requesting physician.    [facility] Preop Guidelines

## 2017-06-26 ENCOUNTER — MYC MEDICAL ADVICE (OUTPATIENT)
Dept: OTOLARYNGOLOGY | Facility: CLINIC | Age: 47
End: 2017-06-26

## 2017-06-28 ENCOUNTER — ANESTHESIA EVENT (OUTPATIENT)
Dept: SURGERY | Facility: AMBULATORY SURGERY CENTER | Age: 47
End: 2017-06-28

## 2017-06-29 ENCOUNTER — HOSPITAL ENCOUNTER (OUTPATIENT)
Facility: AMBULATORY SURGERY CENTER | Age: 47
Discharge: HOME | End: 2017-06-29
Attending: OTOLARYNGOLOGY | Admitting: OTOLARYNGOLOGY
Payer: COMMERCIAL

## 2017-06-29 ENCOUNTER — SURGERY (OUTPATIENT)
Age: 47
End: 2017-06-29

## 2017-06-29 ENCOUNTER — ANESTHESIA (OUTPATIENT)
Dept: SURGERY | Facility: AMBULATORY SURGERY CENTER | Age: 47
End: 2017-06-29

## 2017-06-29 VITALS
RESPIRATION RATE: 18 BRPM | DIASTOLIC BLOOD PRESSURE: 66 MMHG | SYSTOLIC BLOOD PRESSURE: 114 MMHG | OXYGEN SATURATION: 95 % | TEMPERATURE: 97.2 F

## 2017-06-29 DIAGNOSIS — J39.2: ICD-10-CM

## 2017-06-29 DIAGNOSIS — R22.1 NECK MASS: Primary | ICD-10-CM

## 2017-06-29 PROCEDURE — 88307 TISSUE EXAM BY PATHOLOGIST: CPT | Performed by: OTOLARYNGOLOGY

## 2017-06-29 PROCEDURE — 31535 LARYNGOSCOPY W/BIOPSY: CPT | Performed by: OTOLARYNGOLOGY

## 2017-06-29 PROCEDURE — 38510 BIOPSY/REMOVAL LYMPH NODES: CPT | Performed by: OTOLARYNGOLOGY

## 2017-06-29 PROCEDURE — 42815 EXCISION OF NECK CYST: CPT

## 2017-06-29 PROCEDURE — 88305 TISSUE EXAM BY PATHOLOGIST: CPT | Performed by: OTOLARYNGOLOGY

## 2017-06-29 PROCEDURE — 31535 LARYNGOSCOPY W/BIOPSY: CPT

## 2017-06-29 PROCEDURE — 42815 EXCISION OF NECK CYST: CPT | Performed by: OTOLARYNGOLOGY

## 2017-06-29 PROCEDURE — 20205 DEEP MUSCLE BIOPSY: CPT

## 2017-06-29 PROCEDURE — 38510 BIOPSY/REMOVAL LYMPH NODES: CPT

## 2017-06-29 PROCEDURE — G8916 PT W IV AB GIVEN ON TIME: HCPCS

## 2017-06-29 PROCEDURE — G8907 PT DOC NO EVENTS ON DISCHARG: HCPCS

## 2017-06-29 RX ORDER — HYDROCODONE BITARTRATE AND ACETAMINOPHEN 5; 325 MG/1; MG/1
1-2 TABLET ORAL EVERY 4 HOURS PRN
Qty: 30 TABLET | Refills: 0 | Status: SHIPPED | OUTPATIENT
Start: 2017-06-29 | End: 2018-01-31

## 2017-06-29 RX ORDER — ACETAMINOPHEN 325 MG/1
975 TABLET ORAL ONCE
Status: DISCONTINUED | OUTPATIENT
Start: 2017-06-29 | End: 2017-06-30 | Stop reason: HOSPADM

## 2017-06-29 RX ORDER — ONDANSETRON 4 MG/1
4 TABLET, ORALLY DISINTEGRATING ORAL EVERY 30 MIN PRN
Status: DISCONTINUED | OUTPATIENT
Start: 2017-06-29 | End: 2017-06-30 | Stop reason: HOSPADM

## 2017-06-29 RX ORDER — LIDOCAINE HYDROCHLORIDE 20 MG/ML
INJECTION, SOLUTION INFILTRATION; PERINEURAL PRN
Status: DISCONTINUED | OUTPATIENT
Start: 2017-06-29 | End: 2017-06-29

## 2017-06-29 RX ORDER — CEPHALEXIN 500 MG/1
500 CAPSULE ORAL 4 TIMES DAILY
Qty: 28 CAPSULE | Refills: 0 | Status: SHIPPED | OUTPATIENT
Start: 2017-06-29 | End: 2017-07-06

## 2017-06-29 RX ORDER — GABAPENTIN 300 MG/1
300 CAPSULE ORAL ONCE
Status: DISCONTINUED | OUTPATIENT
Start: 2017-06-29 | End: 2017-06-30 | Stop reason: HOSPADM

## 2017-06-29 RX ORDER — DEXAMETHASONE SODIUM PHOSPHATE 4 MG/ML
4 INJECTION, SOLUTION INTRA-ARTICULAR; INTRALESIONAL; INTRAMUSCULAR; INTRAVENOUS; SOFT TISSUE EVERY 10 MIN PRN
Status: DISCONTINUED | OUTPATIENT
Start: 2017-06-29 | End: 2017-06-30 | Stop reason: HOSPADM

## 2017-06-29 RX ORDER — ONDANSETRON 2 MG/ML
4 INJECTION INTRAMUSCULAR; INTRAVENOUS EVERY 30 MIN PRN
Status: DISCONTINUED | OUTPATIENT
Start: 2017-06-29 | End: 2017-06-30 | Stop reason: HOSPADM

## 2017-06-29 RX ORDER — GLYCOPYRROLATE 0.2 MG/ML
INJECTION, SOLUTION INTRAMUSCULAR; INTRAVENOUS PRN
Status: DISCONTINUED | OUTPATIENT
Start: 2017-06-29 | End: 2017-06-29

## 2017-06-29 RX ORDER — PROPOFOL 10 MG/ML
INJECTION, EMULSION INTRAVENOUS PRN
Status: DISCONTINUED | OUTPATIENT
Start: 2017-06-29 | End: 2017-06-29

## 2017-06-29 RX ORDER — MEPERIDINE HYDROCHLORIDE 25 MG/ML
12.5 INJECTION INTRAMUSCULAR; INTRAVENOUS; SUBCUTANEOUS
Status: DISCONTINUED | OUTPATIENT
Start: 2017-06-29 | End: 2017-06-30 | Stop reason: HOSPADM

## 2017-06-29 RX ORDER — ONDANSETRON 2 MG/ML
INJECTION INTRAMUSCULAR; INTRAVENOUS PRN
Status: DISCONTINUED | OUTPATIENT
Start: 2017-06-29 | End: 2017-06-29

## 2017-06-29 RX ORDER — FENTANYL CITRATE 50 UG/ML
25-50 INJECTION, SOLUTION INTRAMUSCULAR; INTRAVENOUS
Status: DISCONTINUED | OUTPATIENT
Start: 2017-06-29 | End: 2017-06-30 | Stop reason: HOSPADM

## 2017-06-29 RX ORDER — ALBUTEROL SULFATE 0.83 MG/ML
2.5 SOLUTION RESPIRATORY (INHALATION) EVERY 4 HOURS PRN
Status: DISCONTINUED | OUTPATIENT
Start: 2017-06-29 | End: 2017-06-30 | Stop reason: HOSPADM

## 2017-06-29 RX ORDER — HYDRALAZINE HYDROCHLORIDE 20 MG/ML
2.5-5 INJECTION INTRAMUSCULAR; INTRAVENOUS EVERY 10 MIN PRN
Status: DISCONTINUED | OUTPATIENT
Start: 2017-06-29 | End: 2017-06-30 | Stop reason: HOSPADM

## 2017-06-29 RX ORDER — LIDOCAINE HYDROCHLORIDE AND EPINEPHRINE 10; 10 MG/ML; UG/ML
INJECTION, SOLUTION INFILTRATION; PERINEURAL PRN
Status: DISCONTINUED | OUTPATIENT
Start: 2017-06-29 | End: 2017-06-29 | Stop reason: HOSPADM

## 2017-06-29 RX ORDER — ACETAMINOPHEN 325 MG/1
975 TABLET ORAL ONCE
Status: COMPLETED | OUTPATIENT
Start: 2017-06-29 | End: 2017-06-29

## 2017-06-29 RX ORDER — GABAPENTIN 300 MG/1
300 CAPSULE ORAL ONCE
Status: COMPLETED | OUTPATIENT
Start: 2017-06-29 | End: 2017-06-29

## 2017-06-29 RX ORDER — NALOXONE HYDROCHLORIDE 0.4 MG/ML
.1-.4 INJECTION, SOLUTION INTRAMUSCULAR; INTRAVENOUS; SUBCUTANEOUS
Status: DISCONTINUED | OUTPATIENT
Start: 2017-06-29 | End: 2017-06-30 | Stop reason: HOSPADM

## 2017-06-29 RX ORDER — DEXAMETHASONE SODIUM PHOSPHATE 4 MG/ML
INJECTION, SOLUTION INTRA-ARTICULAR; INTRALESIONAL; INTRAMUSCULAR; INTRAVENOUS; SOFT TISSUE PRN
Status: DISCONTINUED | OUTPATIENT
Start: 2017-06-29 | End: 2017-06-29

## 2017-06-29 RX ORDER — DEXAMETHASONE SODIUM PHOSPHATE 10 MG/ML
10 INJECTION, SOLUTION INTRAMUSCULAR; INTRAVENOUS ONCE
Status: DISCONTINUED | OUTPATIENT
Start: 2017-06-29 | End: 2017-06-30 | Stop reason: HOSPADM

## 2017-06-29 RX ORDER — LIDOCAINE 40 MG/G
CREAM TOPICAL
Status: DISCONTINUED | OUTPATIENT
Start: 2017-06-29 | End: 2017-06-30 | Stop reason: HOSPADM

## 2017-06-29 RX ORDER — FENTANYL CITRATE 50 UG/ML
INJECTION, SOLUTION INTRAMUSCULAR; INTRAVENOUS PRN
Status: DISCONTINUED | OUTPATIENT
Start: 2017-06-29 | End: 2017-06-29

## 2017-06-29 RX ORDER — SODIUM CHLORIDE, SODIUM LACTATE, POTASSIUM CHLORIDE, CALCIUM CHLORIDE 600; 310; 30; 20 MG/100ML; MG/100ML; MG/100ML; MG/100ML
INJECTION, SOLUTION INTRAVENOUS CONTINUOUS
Status: DISCONTINUED | OUTPATIENT
Start: 2017-06-29 | End: 2017-06-30 | Stop reason: HOSPADM

## 2017-06-29 RX ORDER — FENTANYL CITRATE 50 UG/ML
25-50 INJECTION, SOLUTION INTRAMUSCULAR; INTRAVENOUS EVERY 5 MIN PRN
Status: DISCONTINUED | OUTPATIENT
Start: 2017-06-29 | End: 2017-06-30 | Stop reason: HOSPADM

## 2017-06-29 RX ORDER — CEFAZOLIN SODIUM 1 G/50ML
3 SOLUTION INTRAVENOUS
Status: COMPLETED | OUTPATIENT
Start: 2017-06-29 | End: 2017-06-29

## 2017-06-29 RX ORDER — HYDROMORPHONE HYDROCHLORIDE 1 MG/ML
.3-.5 INJECTION, SOLUTION INTRAMUSCULAR; INTRAVENOUS; SUBCUTANEOUS EVERY 10 MIN PRN
Status: DISCONTINUED | OUTPATIENT
Start: 2017-06-29 | End: 2017-06-30 | Stop reason: HOSPADM

## 2017-06-29 RX ADMIN — Medication 15 MG: at 08:35

## 2017-06-29 RX ADMIN — SODIUM CHLORIDE, SODIUM LACTATE, POTASSIUM CHLORIDE, CALCIUM CHLORIDE: 600; 310; 30; 20 INJECTION, SOLUTION INTRAVENOUS at 08:25

## 2017-06-29 RX ADMIN — FENTANYL CITRATE 50 MCG: 50 INJECTION, SOLUTION INTRAMUSCULAR; INTRAVENOUS at 08:25

## 2017-06-29 RX ADMIN — ACETAMINOPHEN 975 MG: 325 TABLET ORAL at 07:20

## 2017-06-29 RX ADMIN — Medication 100 MCG: at 09:18

## 2017-06-29 RX ADMIN — Medication 100 MCG: at 09:30

## 2017-06-29 RX ADMIN — FENTANYL CITRATE 50 MCG: 50 INJECTION, SOLUTION INTRAMUSCULAR; INTRAVENOUS at 08:50

## 2017-06-29 RX ADMIN — SODIUM CHLORIDE, SODIUM LACTATE, POTASSIUM CHLORIDE, CALCIUM CHLORIDE: 600; 310; 30; 20 INJECTION, SOLUTION INTRAVENOUS at 09:45

## 2017-06-29 RX ADMIN — ONDANSETRON 4 MG: 2 INJECTION INTRAMUSCULAR; INTRAVENOUS at 08:43

## 2017-06-29 RX ADMIN — LIDOCAINE HYDROCHLORIDE AND EPINEPHRINE 4 ML: 10; 10 INJECTION, SOLUTION INFILTRATION; PERINEURAL at 09:08

## 2017-06-29 RX ADMIN — Medication 100 MCG: at 08:55

## 2017-06-29 RX ADMIN — GABAPENTIN 300 MG: 300 CAPSULE ORAL at 07:20

## 2017-06-29 RX ADMIN — DEXAMETHASONE SODIUM PHOSPHATE 8 MG: 4 INJECTION, SOLUTION INTRA-ARTICULAR; INTRALESIONAL; INTRAMUSCULAR; INTRAVENOUS; SOFT TISSUE at 08:35

## 2017-06-29 RX ADMIN — Medication 100 MCG: at 09:08

## 2017-06-29 RX ADMIN — PROPOFOL 200 MG: 10 INJECTION, EMULSION INTRAVENOUS at 08:31

## 2017-06-29 RX ADMIN — SODIUM CHLORIDE, SODIUM LACTATE, POTASSIUM CHLORIDE, CALCIUM CHLORIDE: 600; 310; 30; 20 INJECTION, SOLUTION INTRAVENOUS at 07:45

## 2017-06-29 RX ADMIN — LIDOCAINE HYDROCHLORIDE 80 MG: 20 INJECTION, SOLUTION INFILTRATION; PERINEURAL at 08:31

## 2017-06-29 RX ADMIN — Medication 100 MCG: at 09:50

## 2017-06-29 RX ADMIN — FENTANYL CITRATE 50 MCG: 50 INJECTION, SOLUTION INTRAMUSCULAR; INTRAVENOUS at 10:38

## 2017-06-29 RX ADMIN — CEFAZOLIN SODIUM 3 G: 1 SOLUTION INTRAVENOUS at 08:35

## 2017-06-29 RX ADMIN — GLYCOPYRROLATE 0.2 MG: 0.2 INJECTION, SOLUTION INTRAMUSCULAR; INTRAVENOUS at 08:31

## 2017-06-29 ASSESSMENT — LIFESTYLE VARIABLES: TOBACCO_USE: 1

## 2017-06-29 NOTE — ANESTHESIA POSTPROCEDURE EVALUATION
Patient: Deepa Anderson    Procedure(s):  Direct laryngoscopy with biopsy and excision of left neck mass - Wound Class: II-Clean Contaminated   - Wound Class: I-Clean    Diagnosis:left neck mass, Dr. Pandye assisting  Diagnosis Additional Information: No value filed.    Anesthesia Type:  General, ETT    Note:  Anesthesia Post Evaluation    Patient location during evaluation: PACU  Patient participation: Able to fully participate in evaluation  Level of consciousness: awake  Pain management: adequate  Airway patency: patent  Cardiovascular status: acceptable  Respiratory status: acceptable  Hydration status: acceptable  PONV: none     Anesthetic complications: None    Comments: Stable recovery noted.        Last vitals:  Vitals:    06/29/17 1040 06/29/17 1045 06/29/17 1100   BP: 117/69 124/69 112/68   Resp: 24     Temp:  97  F (36.1  C)    SpO2: 95% 93% 93%         Electronically Signed By: Pete Alcantara MD  June 29, 2017  11:18 AM

## 2017-06-29 NOTE — DISCHARGE INSTRUCTIONS
Instructions for Neck Surgery    Recovery - Everyone recovers differently from a general anesthetic.  Symptoms such as fatigue, nausea, lightheadedness, and sometimes a low grade fever (up to 101 degrees) are not unusual.  As your body removes the anesthetic drugs from circulation, these symptoms will resolve.  The incision area will be sore after surgery, and a mild amount of swelling and redness is normal.  Use ice packs as needed.  There are no diet restrictions after a minor neck operation, and you can resume your home medications, except blood thinners such as aspirin or coumadin, which should not be taken for 1-5 days after surgery. Clarify the length of time with your surgeon.  Limit your activity to no strenuous activities for 1 week, and sleep with your head and neck elevated.  You can shower and let the water run over the incision 24-48 hours after surgery. Do not scrub the incision, but pat it dry when you are finished. Also, do not submerge the incision in a tub until it is entirely healed (approximately 4 weeks following surgery).       Medications - You were sent home with narcotic pain medication.  If you can tolerate the discomfort during your recovery by using Tylenol or Ibuprofen (advil), please do so.  However, do not hesitate to use the stronger pain medication if needed.  If you were sent home with an antibiotic, it is primarily used to help the healing process.  If it causes loose bowel movements or other signs of intolerance, it is appropriate to discontinue it.     Complications - Problems related to open neck operations are usually either due to infection or bleeding.  Signs of infection such as redness, increasing pain, swelling, pus at the incision, and fever should be reported as soon as possible.  The most feared complication is acute bleeding creating pressure on the throat or trachea (wind pipe).  If there is a fast growing swelling of the surgical area and difficulty breathing or  swallowing (like being choked), this is an emergency.  If it is slow but persistent, be taken to an emergency room.  If you feel like your breathing is being blocked - Call 911.    Follow up - If you have a drain in place, this can be taken out 24-48hrs after surgery. We will do this in clinic at your follow-up visit if necessary. We will review the pathology over the phone when it comes back. Skin numbness around an incision on the neck is common, and usually slowly returns over the course of several weeks, and sometimes a few months.    If there are any questions or issues with the above, or if there are other issues that concern you, always feel free to call the clinic and I am happy to speak with you as soon as I can.    Rayray Morton -[phone]       Otolaryngology  [facility]  After hours/ weekends call # [phone]    [facility]  Same-Day Surgery   Adult Discharge Orders & Instructions   For 24 hours after surgery  1. Get plenty of rest.  A responsible adult must stay with you for at least 24 hours after you leave the hospital.   2. Do not drive or use heavy equipment.  If you have weakness or tingling, don't drive or use heavy equipment until this feeling goes away.  3. Do not drink alcohol.  4. Avoid strenuous or risky activities.  Ask for help when climbing stairs.   5. You may feel lightheaded.  IF so, sit for a few minutes before standing.  Have someone help you get up.   6. If you have nausea (feel sick to your stomach): Drink only clear liquids such as apple juice, ginger ale, broth or 7-Up.  Rest may also help.  Be sure to drink enough fluids.  Move to a regular diet as you feel able.  7. You may have a slight fever. Call the doctor if your fever is over 100 F (37.7 C) (taken under the tongue) or lasts longer than 24 hours.  8. You may have a dry mouth, a sore throat, muscle aches or trouble sleeping.  These should go away after 24 hours.  9. Do not make  important or legal decisions.   Call your doctor for any of the followin.  Signs of infection (fever, growing tenderness at the surgery site, a large amount of drainage or bleeding, severe pain, foul-smelling drainage, redness, swelling).  2. It has been over 8 to 10 hours since surgery and you are still not able to urinate (pass water).  3.  Headache for over 24 hours.  4.  Numbness, tingling or weakness the day after surgery (if you had spinal anesthesia).

## 2017-06-29 NOTE — IP AVS SNAPSHOT
MRN:[id number]                      After Visit Summary   6/29/2017    Deepa Anderson    MRN: [id number]           Thank you!     Thank you for choosing [facility] for your care. Our goal is always to provide you with excellent care. Hearing back from our patients is one way we can continue to improve our services. Please take a few minutes to complete the written survey that you may receive in the mail after you visit with us. Thank you!        Patient Information     Date Of Birth          1970        About your hospital stay     You were admitted on:  June 29, 2017 You last received care in the:  [facility]    You were discharged on:  June 29, 2017       Who to Call     For medical emergencies, please call 911.  For non-urgent questions about your medical care, please call your primary care provider or clinic, [phone]  For questions related to your surgery, please call your surgery clinic        Attending Provider     Provider Specialty    Rayray Morton MD Otolaryngology       Primary Care Provider Office Phone # Fax #    Kristen M Kehr, PA-C [phone]      Your next 10 appointments already scheduled     Jun 30, 2017  8:00 AM CDT   Post Op with Rayray Morton MD   [facility]    [address] [address] [phone]              Further instructions from your care team       Instructions for Neck Surgery    Recovery - Everyone recovers differently from a general anesthetic.  Symptoms such as fatigue, nausea, lightheadedness, and sometimes a low grade fever (up to 101 degrees) are not unusual.  As your body removes the anesthetic drugs from circulation, these symptoms will resolve.  The incision area will be sore after surgery, and a mild amount of swelling and redness is normal.  Use ice packs as needed.  There are no diet restrictions after a minor neck operation, and you can resume  your home medications, except blood thinners such as aspirin or coumadin, which should not be taken for 1-5 days after surgery. Clarify the length of time with your surgeon.  Limit your activity to no strenuous activities for 1 week, and sleep with your head and neck elevated.  You can shower and let the water run over the incision 24-48 hours after surgery. Do not scrub the incision, but pat it dry when you are finished. Also, do not submerge the incision in a tub until it is entirely healed (approximately 4 weeks following surgery).       Medications - You were sent home with narcotic pain medication.  If you can tolerate the discomfort during your recovery by using Tylenol or Ibuprofen (advil), please do so.  However, do not hesitate to use the stronger pain medication if needed.  If you were sent home with an antibiotic, it is primarily used to help the healing process.  If it causes loose bowel movements or other signs of intolerance, it is appropriate to discontinue it.     Complications - Problems related to open neck operations are usually either due to infection or bleeding.  Signs of infection such as redness, increasing pain, swelling, pus at the incision, and fever should be reported as soon as possible.  The most feared complication is acute bleeding creating pressure on the throat or trachea (wind pipe).  If there is a fast growing swelling of the surgical area and difficulty breathing or swallowing (like being choked), this is an emergency.  If it is slow but persistent, be taken to an emergency room.  If you feel like your breathing is being blocked - Call 911.    Follow up - If you have a drain in place, this can be taken out 24-48hrs after surgery. We will do this in clinic at your follow-up visit if necessary. We will review the pathology over the phone when it comes back. Skin numbness around an incision on the neck is common, and usually slowly returns over the course of several weeks, and  sometimes a few months.    If there are any questions or issues with the above, or if there are other issues that concern you, always feel free to call the clinic and I am happy to speak with you as soon as I can.    Rayray Morton -[phone]       Otolaryngology  [facility]  After hours/ weekends call # [phone]    [facility]  Same-Day Surgery   Adult Discharge Orders & Instructions   For 24 hours after surgery  1. Get plenty of rest.  A responsible adult must stay with you for at least 24 hours after you leave the hospital.   2. Do not drive or use heavy equipment.  If you have weakness or tingling, don't drive or use heavy equipment until this feeling goes away.  3. Do not drink alcohol.  4. Avoid strenuous or risky activities.  Ask for help when climbing stairs.   5. You may feel lightheaded.  IF so, sit for a few minutes before standing.  Have someone help you get up.   6. If you have nausea (feel sick to your stomach): Drink only clear liquids such as apple juice, ginger ale, broth or 7-Up.  Rest may also help.  Be sure to drink enough fluids.  Move to a regular diet as you feel able.  7. You may have a slight fever. Call the doctor if your fever is over 100 F (37.7 C) (taken under the tongue) or lasts longer than 24 hours.  8. You may have a dry mouth, a sore throat, muscle aches or trouble sleeping.  These should go away after 24 hours.  9. Do not make important or legal decisions.   Call your doctor for any of the followin.  Signs of infection (fever, growing tenderness at the surgery site, a large amount of drainage or bleeding, severe pain, foul-smelling drainage, redness, swelling).  2. It has been over 8 to 10 hours since surgery and you are still not able to urinate (pass water).  3.  Headache for over 24 hours.  4.  Numbness, tingling or weakness the day after surgery (if you had spinal anesthesia).          Pending Results     No orders found from  6/27/2017 to 6/30/2017.            Admission Information     Date & Time Provider Department Dept. Phone    6/29/2017 Rayray Morton MD [facility] [phone]      Your Vitals Were     Blood Pressure Temperature Respirations Pulse Oximetry          124/69 97  F (36.1  C) (Temporal) 24 93%        MyChart Information     [email or web address] gives you secure access to your electronic health record. If you see a primary care provider, you can also send messages to your care team and make appointments. If you have questions, please call your primary care clinic.  If you do not have a primary care provider, please call [phone] and they will assist you.        Care EveryWhere ID     This is your Care EveryWhere ID. This could be used by other organizations to access your [facility] medical records  [id number]        Equal Access to Services     SANDHYA IVEY : Cm Peoples, wajerome card, simi allred, cam calderon. So [facility] [phone].    ATENCIÓN: Si habla español, tiene a segovia disposición servicios gratuitos de asistencia lingüística. Jocelin al [phone].    We comply with applicable federal civil rights laws and [place] laws. We do not discriminate on the basis of race, color, national origin, age, disability sex, sexual orientation or gender identity.               Review of your medicines      START taking        Dose / Directions    cephALEXin 500 MG capsule   Commonly known as:  KEFLEX   Used for:  Neck mass        Dose:  500 mg   Take 1 capsule (500 mg) by mouth 4 times daily for 7 days   Quantity:  28 capsule   Refills:  0       HYDROcodone-acetaminophen 5-325 MG per tablet   Commonly known as:  NORCO   Used for:  Neck mass        Dose:  1-2 tablet   Take 1-2 tablets by mouth every 4 hours as needed for moderate to severe pain   Quantity:  30 tablet   Refills:  0         CONTINUE these medicines which have NOT CHANGED        Dose /  Directions    ibuprofen 200 MG tablet   Commonly known as:  ADVIL/MOTRIN        Dose:  200 mg   Take 200 mg by mouth every 4 hours as needed Reported on 4/17/2017   Refills:  0       MULTI-VITAMIN PO        Take  by mouth. Takes 1 tab daily   Refills:  0       omeprazole 20 MG CR capsule   Commonly known as:  priLOSEC   Used for:  Abdominal pain, epigastric        Dose:  20 mg   Take 1 capsule (20 mg) by mouth daily   Quantity:  90 capsule   Refills:  1            Where to get your medicines      These medications were sent to [facility] - [place], [place] - [address], [address]  [address], [address], [facility] [address]     Phone:  [phone]     cephALEXin 500 MG capsule         Some of these will need a paper prescription and others can be bought over the counter. Ask your nurse if you have questions.     Bring a paper prescription for each of these medications     HYDROcodone-acetaminophen 5-325 MG per tablet                Protect others around you: Learn how to safely use, store and throw away your medicines at www.disposemymeds.org.             Medication List: This is a list of all your medications and when to take them. Check marks below indicate your daily home schedule. Keep this list as a reference.      Medications           Morning Afternoon Evening Bedtime As Needed    cephALEXin 500 MG capsule   Commonly known as:  KEFLEX   Take 1 capsule (500 mg) by mouth 4 times daily for 7 days                                HYDROcodone-acetaminophen 5-325 MG per tablet   Commonly known as:  NORCO   Take 1-2 tablets by mouth every 4 hours as needed for moderate to severe pain                                ibuprofen 200 MG tablet   Commonly known as:  ADVIL/MOTRIN   Take 200 mg by mouth every 4 hours as needed Reported on 4/17/2017                                MULTI-VITAMIN PO   Take  by mouth. Takes 1 tab daily                                omeprazole 20 MG CR capsule    Commonly known as:  priLOSEC   Take 1 capsule (20 mg) by mouth daily

## 2017-06-29 NOTE — ANESTHESIA PREPROCEDURE EVALUATION
Anesthesia Evaluation     . Pt has had prior anesthetic. Type: General and MAC    No history of anesthetic complications          ROS/MED HX    ENT/Pulmonary:     (+)tobacco use, Current use 0.5 packs/day  , . .    Neurologic:  - neg neurologic ROS     Cardiovascular:  - neg cardiovascular ROS       METS/Exercise Tolerance:     Hematologic:  - neg hematologic  ROS       Musculoskeletal:  - neg musculoskeletal ROS       GI/Hepatic:  - neg GI/hepatic ROS       Renal/Genitourinary:  - ROS Renal section negative       Endo:  - neg endo ROS       Psychiatric:  - neg psychiatric ROS       Infectious Disease:  - neg infectious disease ROS       Malignancy:      - no malignancy   Other:    (+) No chance of pregnancy                    Physical Exam  Normal systems: cardiovascular, pulmonary and dental    Airway   Mallampati: I  TM distance: >3 FB  Neck ROM: full    Dental     Cardiovascular       Pulmonary    breath sounds clear to auscultation                    Anesthesia Plan      History & Physical Review  History and physical reviewed and following examination; no interval change.    ASA Status:  2 .        Plan for General and ETT with Intravenous and Propofol induction. Maintenance will be Balanced.    PONV prophylaxis:  Ondansetron (or other 5HT-3) and Dexamethasone or Solumedrol  Smaller 6.5 ET tube for biopsy      Postoperative Care  Postoperative pain management:  Multi-modal analgesia.      Consents  Anesthetic plan, risks, benefits and alternatives discussed with:  Patient..                          .

## 2017-06-29 NOTE — BRIEF OP NOTE
PREOPERATIVE DIAGNOSES:     1. Left Neck Mass    POSTOPERATIVE DIAGNOSES:     1. Left Neck Mass  2. Base of tongue cyst    PROCEDURE PERFORMED:     1. Direct laryngoscopy with biopsy  2. Open excisional biopsy of left level 2 neck mass    SURGEON: Rayray Morton MD     ASSISTANT: Rashid Pandey MD    BLOOD LOSS: 5 mL.     COMPLICATIONS: None.     SPECIMENS:     1. Left base of tongue biopsy  2. Left level 2 neck mass  3. Left level 2 lymph node    ANESTHESIA: GETA.     FINDINGS, GRAFTS, IMPLANTS: none

## 2017-06-29 NOTE — OP NOTE
PREOPERATIVE DIAGNOSES:     1. Left Neck Mass    POSTOPERATIVE DIAGNOSES:     1. Left Neck Mass  2. Base of tongue cyst    PROCEDURE PERFORMED:     1. Direct laryngoscopy with biopsy  2. Open excisional biopsy of left level 2 neck mass    SURGEON: Rayray Morton MD     ASSISTANT: Rashid Pandey MD    BLOOD LOSS: 5 mL.     COMPLICATIONS: None.     SPECIMENS:     1. Left base of tongue biopsy  2. Left level 2 neck mass  3. Left level 2 lymph node    ANESTHESIA: GETA.     FINDINGS, GRAFTS, IMPLANTS: none    INDICATIONS: Deepa Anderson presented to me with a history of a left level 2 cystic neck mass. Preoperatively, the risks discussed included the risks of infection, bleeding, tongue, shoulder, and/or face weakness, face or neck numbness, airway complications, and the risks of general anesthesia. Also, the possibility of need for return to the operating room was discussed. The parents understood and wished to proceed.     OPERATIVE PROCEDURE: After being taken to the operating room and induction of general endotracheal tube anesthesia, the patient was turned 90 degrees. Her head was wrapped and a mouth guard was placed. A procedural pause was conducted to identify the patient by name, birthday and procedure. A dedo laryngoscope was used. I first palpated the tonsils and tongue base and felt no abnormality. I then examined the left tonsil by sliding the laryngoscope to this area. There were no masses or abnormalities. I then examined the entire tongue base and vallecula. She had a central tongue base cyst. I biopsied this and the adjacent tongue with a cups. Three small specimens were sent as tongue base biopsy and placed in formalin. The cyst had mucopurulent material on biopsy, and was thought to be benign. No other masses or abnormalities were seen in the tongue base. I advanced the scope into the left hypopharynx. I saw no nodules or lesions or abnormalities. The postarytenoid area was normal. No lesions on the  posterior oropharyngeal and hypopharyngeal walls. The epiglottis was normal. The supraglottic structures were normal. The scope was advanced into the laryngeal inlet. The vocal cords were white and no lesions were seen. The right hypopharynx and tonsil were examined and were also normal. I used a 5 mm rigid endoscope and took photos of some of these locations. The direct laryngoscopy was then complete, and everything was removed from the mouth.    I then positioned the patient with a shoulder roll. Her neck was turned to expose the left lateral neck. A horizontal skin crease approximately 2 finger breadths below the mandible was identified and marked. I injected 4 ml of 1% lidocaine, 1:100,000 epinephrine. She was then prepped and draped in the normal sterile fashion. Next, a fifteen blade was used to incise through the skin and subcutaneous fat of the left lateral neck. This was carried through the platysma. I raised a subplatysmal flap superiorly. Dr. Pandey raised a subplatysmal flap inferiorly. Stay hooks were placed. The fascia overlying the mass was dissected down onto the lateral portion of the mass. The SCM was dissected from the mass posteriorly and was retracted lateral. The mass was dissected from the adjacent soft tissue anteriorly and inferiorly. A small portion of the mass opened and purulent material extruded. This was suctioned and the wound was irrigated. A suture was used to close the opening and as a retractor. Then working inferiorly to superiorly the mass was dissected on the deep surface from the jugular vein and a branch off of this. It was lastly freed from the digastric and adjacent soft tissue superiorly, and the mass came out and was sent off for permanent pathology. Cranial nerve 11 was safely superiorlateral from the mass and was not disrupted. A small adjacent lymph node, 1 cm was dissected free and sent as left level 2 lymph node. The would was irrigated. Any small bleeding was  controlled with bipolar cautery. Valsalva was performed and there was no significant bleeding. I proceeded to close the incision suturing the platysma with 3-0 vicryl interrupted sutures, and the deep dermis with 4-0 monocryl interrupted sutures. I then reapproximated the skin with dermabond.    The skin was cleansed and the patient was awakened, extubated and sent to the recovery room in good condition.

## 2017-06-29 NOTE — IP AVS SNAPSHOT
[facility]    [address] [place] [address]    Phone:  [phone]                                       After Visit Summary   6/29/2017    Deepa Anderson    MRN: [id number]           After Visit Summary Signature Page     I have received my discharge instructions, and my questions have been answered. I have discussed any challenges I see with this plan with the nurse or doctor.    ..........................................................................................................................................  Patient/Patient Representative Signature      ..........................................................................................................................................  Patient Representative Print Name and Relationship to Patient    ..................................................               ................................................  Date                                            Time    ..........................................................................................................................................  Reviewed by Signature/Title    ...................................................              ..............................................  Date                                                            Time

## 2017-06-29 NOTE — ANESTHESIA CARE TRANSFER NOTE
Patient: Deepa Anderson    Procedure(s):  Direct laryngoscopy with biopsy and excision of left neck mass - Wound Class: II-Clean Contaminated   - Wound Class: I-Clean    Diagnosis: left neck mass, Dr. Pandey assisting  Diagnosis Additional Information: No value filed.    Anesthesia Type:   General, ETT     Note:  Airway :Face Mask  Patient transferred to:PACU  Comments: /59, HR 90, Sats 98%, RR 13, Temp 97.3      Vitals: (Last set prior to Anesthesia Care Transfer)    CRNA VITALS  6/29/2017 0953 - 6/29/2017 1028      6/29/2017             Pulse: 134    SpO2: 100 %    Resp Rate (observed): 10                Electronically Signed By: THUAN Swartz CRNA  June 29, 2017  10:28 AM

## 2017-06-30 ENCOUNTER — OFFICE VISIT (OUTPATIENT)
Dept: OTOLARYNGOLOGY | Facility: CLINIC | Age: 47
End: 2017-06-30
Payer: COMMERCIAL

## 2017-06-30 VITALS — BODY MASS INDEX: 39.76 KG/M2 | RESPIRATION RATE: 16 BRPM | HEIGHT: 71 IN | WEIGHT: 284 LBS

## 2017-06-30 DIAGNOSIS — R22.1 MASS OF NECK: Primary | ICD-10-CM

## 2017-06-30 PROCEDURE — 99024 POSTOP FOLLOW-UP VISIT: CPT | Performed by: OTOLARYNGOLOGY

## 2017-06-30 ASSESSMENT — PAIN SCALES - GENERAL: PAINLEVEL: MILD PAIN (2)

## 2017-06-30 NOTE — PATIENT INSTRUCTIONS
General Scheduling Information  To schedule your CT/MRI scan, please contact [place] [facility] at [phone]   [address] [place] [address]    To schedule your Surgery, please contact our Specialty Schedulers at [phone]    ENT Clinic Locations Clinic Hours Telephone Number     [facility]  [address] [place] [address]   Tuesday:       8:00am -- 4:00pm    Wednesday:  8:00am - 4:00pm   To schedule an appointment with   Dr. Morton,   please contact our   Specialty Scheduling Department at:     [phone]       [facility]  [address] [place] [address]   Friday:          8:00am - 4:00pm         Urgent Care Locations Clinic Hours Telephone Numbers     [facility]d  [address] [place] [address]     Monday-Friday:     11:00pm - 9:00pm    Saturday-Sunday:  9:00am - 5:00pm   [phone]     [facility]  [address] [place] [address]     Monday-Friday:      5:00pm - 9:00pm     Saturday-Sunday:  9:00am - 5:00pm   [phone]

## 2017-06-30 NOTE — MR AVS SNAPSHOT
After Visit Summary   6/30/2017    Deepa Anderson    MRN: [id number]           Patient Information     Date Of Birth          1970        Visit Information        Provider Department      6/30/2017 8:00 AM Rayray Morton MD [facility]        Today's Diagnoses     Mass of neck    -  1      Care Instructions    General Scheduling Information  To schedule your CT/MRI scan, please contact [place] at [phone]   [address] [place] [address]    To schedule your Surgery, please contact our Specialty Schedulers at [phone]    ENT Clinic Locations Clinic Hours Telephone Number     [place]  [address]  [place] [address]   Tuesday:       8:00am -- 4:00pm    Wednesday:  8:00am - 4:00pm   To schedule an appointment with   Dr. Morton,   please contact our   Specialty Scheduling Department at:     [phone]       [facility]  [address] [place] [address]   Friday:          8:00am - 4:00pm         Urgent Care Locations Clinic Hours Telephone Numbers     [place]  [address]  [place] [address]     Monday-Friday:     11:00pm - 9:00pm    Saturday-Sunday:  9:00am - 5:00pm   [phone]     [facility]  [address] [place] [address]     Monday-Friday:      5:00pm - 9:00pm     Saturday-Sunday:  9:00am - 5:00pm   [phone]               Follow-ups after your visit        Who to contact     If you have questions or need follow up information about today's clinic visit or your schedule please contact [facility] directly at [phone].  Normal or non-critical lab and imaging results will be communicated to you by MyChart, letter or phone within 4 business days after the clinic has received the results. If you do not hear from us within 7 days, please contact the clinic through MyChart or phone. If you have a critical or abnormal lab result, we will notify you by phone as  "soon as possible.  Submit refill requests through [employer] or call your pharmacy and they will forward the refill request to us. Please allow 3 business days for your refill to be completed.          Additional Information About Your Visit        [employer] Information     [employer] gives you secure access to your electronic health record. If you see a primary care provider, you can also send messages to your care team and make appointments. If you have questions, please call your primary care clinic.  If you do not have a primary care provider, please call [phone] and they will assist you.        Care EveryWhere ID     This is your Care EveryWhere ID. This could be used by other organizations to access your [facility] medical records  [id number]        Your Vitals Were     Respirations Height BMI (Body Mass Index)             16 1.803 m (5' 11\") 39.61 kg/m2          Blood Pressure from Last 3 Encounters:   06/29/17 114/66   06/21/17 127/82   06/01/17 122/75    Weight from Last 3 Encounters:   06/30/17 128.8 kg (284 lb)   06/21/17 127 kg (280 lb)   06/06/17 128.8 kg (284 lb)              Today, you had the following     No orders found for display       Primary Care Provider Office Phone # Fax #    Kristen M Kehr, PA-C [phone]       [facility] [address] [facility] [address]        Equal Access to Services     SANDHYA IVEY : Hadii aad ku hadasho Soomaali, waaxda luqadaha, qaybta kaalmada adeegyada, cam petty . So [facility] [place] [phone].    ATENCIÓN: Si habla español, tiene a segovia disposición servicios gratuitos de asistencia lingüística. Jocelin al [phone].    We comply with applicable federal civil rights laws and [place] laws. We do not discriminate on the basis of race, color, national origin, age, disability sex, sexual orientation or gender identity.            Thank you!     Thank you for choosing [facility]  for your care. Our " goal is always to provide you with excellent care. Hearing back from our patients is one way we can continue to improve our services. Please take a few minutes to complete the written survey that you may receive in the mail after your visit with us. Thank you!             Your Updated Medication List - Protect others around you: Learn how to safely use, store and throw away your medicines at www.disposemymeds.org.          This list is accurate as of: 6/30/17  8:11 AM.  Always use your most recent med list.                   Brand Name Dispense Instructions for use Diagnosis    cephALEXin 500 MG capsule    KEFLEX    28 capsule    Take 1 capsule (500 mg) by mouth 4 times daily for 7 days    Neck mass       HYDROcodone-acetaminophen 5-325 MG per tablet    NORCO    30 tablet    Take 1-2 tablets by mouth every 4 hours as needed for moderate to severe pain    Neck mass       ibuprofen 200 MG tablet    ADVIL/MOTRIN     Take 200 mg by mouth every 4 hours as needed Reported on 4/17/2017        MULTI-VITAMIN PO      Take  by mouth. Takes 1 tab daily        omeprazole 20 MG CR capsule    priLOSEC    90 capsule    Take 1 capsule (20 mg) by mouth daily    Abdominal pain, epigastric

## 2017-06-30 NOTE — PROGRESS NOTES
History of Present Illness - Deepa Anderson is a 47 year old female who is status post direct laryngoscopy and left neck level 2 mass excision on 6/29/2017. Pathology is pending. She notes a sore tongue left side. Pain was easily controlled. No wound concerns. No symptoms of infection including redness, swelling, purulent drainage, or fever. Some swelling.     Exam -   General - The patient is well nourished and well developed, and appears to have good nutritional status.  Alert and oriented to person and place, answers questions and cooperates with examination appropriately.   Head and Face - Normocephalic and atraumatic, with no gross asymmetry noted of the contour of the facial features.  The facial nerve is intact, with strong symmetric movements.  Eyes - Extraocular movements intact. Sclera were not icteric or injected, conjunctiva were pink and moist.  Mouth - Examination of the oral cavity shows pink, healthy, moist mucosa. Mild left anterior tongue abrasion. The dentition are in good repair.  The tongue is mobile and midline.   Neuro - CN II-XII are intact. HB 1/6 bilaterally.  Neck - incision is c/d/i and flat. No hematoma or abscess. No lymphadenopathy.      A/P - Deepa Anderson is healing appropriately status post DL with tongue biopsy and left neck mass excision.  I discussed wound care. No heavy lifting or exercise for 1 week. No sun exposure on the incision for 1 year to reduce scarring color changes. I'll call with pathology next week.

## 2017-07-06 LAB — COPATH REPORT: NORMAL

## 2017-07-07 ENCOUNTER — TELEPHONE (OUTPATIENT)
Dept: OTOLARYNGOLOGY | Facility: CLINIC | Age: 47
End: 2017-07-07

## 2017-07-07 NOTE — TELEPHONE ENCOUNTER
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 Tongue biopsy was benign as well. She reports wound is healing well. Return as needed.

## 2018-01-31 ENCOUNTER — OFFICE VISIT (OUTPATIENT)
Dept: FAMILY MEDICINE | Facility: CLINIC | Age: 48
End: 2018-01-31
Payer: COMMERCIAL

## 2018-01-31 VITALS
SYSTOLIC BLOOD PRESSURE: 126 MMHG | TEMPERATURE: 97.9 F | HEART RATE: 77 BPM | DIASTOLIC BLOOD PRESSURE: 80 MMHG | BODY MASS INDEX: 39.76 KG/M2 | HEIGHT: 71 IN | WEIGHT: 284 LBS

## 2018-01-31 DIAGNOSIS — Z13.220 LIPID SCREENING: ICD-10-CM

## 2018-01-31 DIAGNOSIS — Z13.1 SCREENING FOR DIABETES MELLITUS: ICD-10-CM

## 2018-01-31 DIAGNOSIS — D12.6 TUBULAR ADENOMA OF COLON: ICD-10-CM

## 2018-01-31 DIAGNOSIS — Z12.4 SCREENING FOR MALIGNANT NEOPLASM OF CERVIX: ICD-10-CM

## 2018-01-31 DIAGNOSIS — N95.1 PERIMENOPAUSE: ICD-10-CM

## 2018-01-31 DIAGNOSIS — Z00.00 ROUTINE GENERAL MEDICAL EXAMINATION AT A HEALTH CARE FACILITY: Primary | ICD-10-CM

## 2018-01-31 DIAGNOSIS — M67.40 GANGLION CYST: ICD-10-CM

## 2018-01-31 DIAGNOSIS — Z12.31 VISIT FOR SCREENING MAMMOGRAM: ICD-10-CM

## 2018-01-31 DIAGNOSIS — R10.13 ABDOMINAL PAIN, EPIGASTRIC: ICD-10-CM

## 2018-01-31 DIAGNOSIS — F17.200 TOBACCO USE DISORDER: ICD-10-CM

## 2018-01-31 LAB
CHOLEST SERPL-MCNC: 194 MG/DL
GLUCOSE SERPL-MCNC: 90 MG/DL (ref 70–99)
HDLC SERPL-MCNC: 58 MG/DL
LDLC SERPL CALC-MCNC: 104 MG/DL
NONHDLC SERPL-MCNC: 136 MG/DL
TRIGL SERPL-MCNC: 160 MG/DL

## 2018-01-31 PROCEDURE — 99213 OFFICE O/P EST LOW 20 MIN: CPT | Mod: 25 | Performed by: PHYSICIAN ASSISTANT

## 2018-01-31 PROCEDURE — 80061 LIPID PANEL: CPT | Performed by: PHYSICIAN ASSISTANT

## 2018-01-31 PROCEDURE — G0145 SCR C/V CYTO,THINLAYER,RESCR: HCPCS | Performed by: PHYSICIAN ASSISTANT

## 2018-01-31 PROCEDURE — 87624 HPV HI-RISK TYP POOLED RSLT: CPT | Performed by: PHYSICIAN ASSISTANT

## 2018-01-31 PROCEDURE — 99396 PREV VISIT EST AGE 40-64: CPT | Performed by: PHYSICIAN ASSISTANT

## 2018-01-31 PROCEDURE — 82947 ASSAY GLUCOSE BLOOD QUANT: CPT | Performed by: PHYSICIAN ASSISTANT

## 2018-01-31 PROCEDURE — 36415 COLL VENOUS BLD VENIPUNCTURE: CPT | Performed by: PHYSICIAN ASSISTANT

## 2018-01-31 RX ORDER — CITALOPRAM HYDROBROMIDE 20 MG/1
TABLET ORAL
Qty: 90 TABLET | Refills: 3 | Status: SHIPPED | OUTPATIENT
Start: 2018-01-31 | End: 2018-03-19

## 2018-01-31 NOTE — PROGRESS NOTES
SUBJECTIVE:   CC: Deepa Anderson is an 47 year old woman who presents for preventive health visit.     Physical   Annual:     Getting at least 3 servings of Calcium per day::  Yes    Bi-annual eye exam::  Yes    Dental care twice a year::  Yes    Sleep apnea or symptoms of sleep apnea::  Daytime drowsiness    Diet::  Regular (no restrictions)    Frequency of exercise::  2-3 days/week    Duration of exercise::  30-45 minutes    Taking medications regularly::  Not Applicable    Additional concerns today::  YES            Discuss menopause.     Today's PHQ-2 Score:   PHQ-2 ( 1999 [employer]) 1/31/2018   Q1: Little interest or pleasure in doing things 0   Q2: Feeling down, depressed or hopeless 0   PHQ-2 Score 0   Q1: Little interest or pleasure in doing things Not at all   Q2: Feeling down, depressed or hopeless Not at all   PHQ-2 Score 0       Abuse: Current or Past(Physical, Sexual or Emotional)- No  Do you feel safe in your environment - Yes    Social History   Substance Use Topics     Smoking status: Light Tobacco Smoker     Packs/day: 0.50     Years: 25.00     Types: Cigarettes     Smokeless tobacco: Never Used     Alcohol use Yes      Comment: 3-4 drinks per week     Alcohol Use 1/31/2018   If you drink alcohol, do you typically have greater than 3 drinks per day OR greater than 7 drinks per week?   No   No flowsheet data found.    Reviewed orders with patient.  Reviewed health maintenance and updated orders accordingly - Yes  Labs reviewed in EPIC  BP Readings from Last 3 Encounters:   01/31/18 126/80   06/29/17 114/66   06/21/17 127/82    Wt Readings from Last 3 Encounters:   01/31/18 284 lb (128.8 kg)   06/30/17 284 lb (128.8 kg)   06/21/17 280 lb (127 kg)                  Patient Active Problem List   Diagnosis     History of dysplastic nevus     Acne vulgaris     Obesity     CARDIOVASCULAR SCREENING; LDL GOAL LESS THAN 160     H. pylori infection     Diarrhea     Tubular adenoma of colon     Abdominal pain,  epigastric     Gallstones     S/P laparoscopic cholecystectomy     Tobacco use disorder     Past Surgical History:   Procedure Laterality Date     ABDOMEN SURGERY   &      births     BIOPSY  2015    polyp removed     C  DELIVERY ONLY      x2/'02,'06     CHOLECYSTECTOMY  06/15/2016     COLONOSCOPY  2015     COMBINED ESOPHAGOSCOPY, GASTROSCOPY, DUODENOSCOPY (EGD) WITH CO2 INSUFFLATION N/A 2017    Procedure: COMBINED ESOPHAGOSCOPY, GASTROSCOPY, DUODENOSCOPY (EGD) WITH CO2 INSUFFLATION;  EGD-MASS OF NECK/ DELL;  Surgeon: Sohan Renee MD;  Location:  OR     CYSTECTOMY PILONIDAL       EXCISE MASS NECK Left 2017    Procedure: EXCISE MASS NECK;;  Surgeon: Rayray Morton MD;  Location: [facility] OR     HEAD & NECK SURGERY  2017    brachial cleft cyst     LARYNGOSCOPY WITH BIOPSY(IES) N/A 2017    Procedure: LARYNGOSCOPY WITH BIOPSY(IES);  Direct laryngoscopy with biopsy and excision of left neck mass;  Surgeon: Rayray Morton MD;  Location: [facility] OR     SOFT TISSUE SURGERY      Pilonidal cyst surgery     TUBAL LIGATION  '06       Social History   Substance Use Topics     Smoking status: Light Tobacco Smoker     Packs/day: 0.50     Years: 25.00     Types: Cigarettes     Smokeless tobacco: Never Used     Alcohol use Yes      Comment: 3-4 drinks per week     Family History   Problem Relation Age of Onset     Arthritis Mother      Lipids Mother      Hypertension Mother      Obesity Mother      Thyroid Disease Mother      Cardiovascular Father      Hypertension Father      CEREBROVASCULAR DISEASE Father      Hypertension Brother      Hypertension Brother      CANCER No family hx of      Glaucoma No family hx of      Macular Degeneration No family hx of      DIABETES No family hx of          Allergies   Allergen Reactions     Percodan [Aspirin]        Patient under age 50, mutual decision reflected in health maintenance.      Pertinent mammograms are  reviewed under the imaging tab.  History of abnormal Pap smear:   NO - age 30-65 PAP every 5 years with negative HPV co-testing recommended  Last 3 Pap and HPV Results:   PAP / HPV 2013 11/3/2010   PAP NIL NIL       Reviewed and updated as needed this visit by clinical staff  Tobacco  Allergies  Meds  Problems  Med Hx  Surg Hx  Fam Hx  Soc Hx          Reviewed and updated as needed this visit by Provider  Allergies  Meds  Problems        Past Medical History:   Diagnosis Date     Acne      Actinic keratosis 2010    porokeratosis R hand     History of atypical/dysplastic nevus     back and R thigh.     Obesity       Past Surgical History:   Procedure Laterality Date     ABDOMEN SURGERY   &      births     BIOPSY  2015    polyp removed     C  DELIVERY ONLY      x2/'02,'06     CHOLECYSTECTOMY  06/15/2016     COLONOSCOPY  2015     COMBINED ESOPHAGOSCOPY, GASTROSCOPY, DUODENOSCOPY (EGD) WITH CO2 INSUFFLATION N/A 2017    Procedure: COMBINED ESOPHAGOSCOPY, GASTROSCOPY, DUODENOSCOPY (EGD) WITH CO2 INSUFFLATION;  EGD-MASS OF NECK/ DELL;  Surgeon: Sohan Renee MD;  Location:  OR     CYSTECTOMY PILONIDAL  '     EXCISE MASS NECK Left 2017    Procedure: EXCISE MASS NECK;;  Surgeon: Rayray Morton MD;  Location:  OR     HEAD & NECK SURGERY  2017    brachial cleft cyst     LARYNGOSCOPY WITH BIOPSY(IES) N/A 2017    Procedure: LARYNGOSCOPY WITH BIOPSY(IES);  Direct laryngoscopy with biopsy and excision of left neck mass;  Surgeon: Rayray Morton MD;  Location:  OR     SOFT TISSUE SURGERY      Pilonidal cyst surgery     TUBAL LIGATION  '06       Review of Systems  C: NEGATIVE for fever, chills, change in weight  I: NEGATIVE for worrisome rashes, moles or lesions  E: NEGATIVE for vision changes or irritation  ENT: NEGATIVE for ear, mouth and throat problems  R: NEGATIVE for significant cough or SOB  B: NEGATIVE for masses,  "tenderness or discharge  CV: NEGATIVE for chest pain, palpitations or peripheral edema  GI: NEGATIVE for nausea, abdominal pain, heartburn, or change in bowel habits   female: no unusual urinary symptoms and menses: irregular, she is perimenopausal and will skip menses, she is having very light bleeding or super heavy at times. She also has mood swings and difficulty sleeping.   M: NEGATIVE for significant arthralgias or myalgia  N: NEGATIVE for weakness, dizziness or paresthesias  P: NEGATIVE for changes in mood or affect  PSYCHIATRIC: mood swings associated with perimenopause     OBJECTIVE:   /80  Pulse 77  Temp 97.9  F (36.6  C) (Oral)  Ht 5' 10.5\" (1.791 m)  Wt 284 lb (128.8 kg)  BMI 40.17 kg/m2  Physical Exam  GENERAL: healthy, alert and no distress  EYES: Eyes grossly normal to inspection, PERRL and conjunctivae and sclerae normal  HENT: ear canals and TM's normal, nose and mouth without ulcers or lesions  NECK: no adenopathy, no asymmetry, masses, or scars and thyroid normal to palpation  RESP: lungs clear to auscultation - no rales, rhonchi or wheezes  BREAST: normal without masses, tenderness or nipple discharge and no palpable axillary masses or adenopathy  CV: regular rate and rhythm, normal S1 S2, no S3 or S4, no murmur, click or rub, no peripheral edema and peripheral pulses strong  ABDOMEN: soft, nontender, no hepatosplenomegaly, no masses and bowel sounds normal   (female): normal female external genitalia, normal urethral meatus, vaginal mucosa pink, moist, well rugated, and normal cervix/adnexa/uterus without masses or discharge  MS: no gross musculoskeletal defects noted, no edema. There is a small ganglion cyst on the left wrist  SKIN: no suspicious lesions or rashes  NEURO: Normal strength and tone, mentation intact and speech normal  PSYCH: mentation appears normal, affect normal/bright    ASSESSMENT/PLAN:   1. Routine general medical examination at a health care facility  Health " "maintenance reviewed and updated.    2. Visit for screening mammogram  - MA SCREENING DIGITAL BILAT - Future  (s+30); Future    3. Tubular adenoma of colon  Due for colonoscopy every 3 years  - GASTROENTEROLOGY ADULT REF PROCEDURE ONLY    4. Screening for diabetes mellitus  - GLUCOSE    5. Abdominal pain, epigastric  Takes as needed for GERD  - omeprazole (PRILOSEC) 20 MG CR capsule; Take 1 capsule (20 mg) by mouth daily  Dispense: 90 capsule; Refill: 3    6. Lipid screening  - Lipid panel reflex to direct LDL Fasting    7. Perimenopause  Trial of citalopram. Side effects and how to take the medication discussed.  MyChart message in 1-2 months after starting medication, sooner if concerns.   She is not a candidate for HRT due to smoking status.   - citalopram (CELEXA) 20 MG tablet; Take 1/2 tablet (10 mg) for 1 weeks, then increase to 1 tablet orally daily  Dispense: 90 tablet; Refill: 3    8. Ganglion cyst  This was discussed last year at an appointment. She believes it has gotten larger and more pain associated with it. Referral given. She had declined this in the past.   - ORTHOPEDICS ADULT REFERRAL    9. Screening for malignant neoplasm of cervix  - Pap imaged thin layer screen with HPV - recommended age 30 - 65 years (select HPV order below)  - HPV High Risk Types DNA Cervical    10. Tobacco use:   Encouraged to quit    11. Obesity  Encouraged to work on making lifestyle changes    COUNSELING:  Reviewed preventive health counseling, as reflected in patient instructions       Regular exercise       Healthy diet/nutrition       Contraception       reports that she has been smoking Cigarettes.  She has a 12.50 pack-year smoking history. She has never used smokeless tobacco.  Tobacco Cessation Action Plan: Information offered: Patient not interested at this time  Estimated body mass index is 40.17 kg/(m^2) as calculated from the following:    Height as of this encounter: 5' 10.5\" (1.791 m).    Weight as of this " encounter: 284 lb (128.8 kg).   Weight management plan: Discussed healthy diet and exercise guidelines and patient will follow up in 12 months in clinic to re-evaluate.    Counseling Resources:  ATP IV Guidelines  Pooled Cohorts Equation Calculator  Breast Cancer Risk Calculator  FRAX Risk Assessment  ICSI Preventive Guidelines  Dietary Guidelines for Americans, 2010  USDA's MyPlate  ASA Prophylaxis  Lung CA Screening    Kristen M. Kehr, PA-C  [facility]

## 2018-01-31 NOTE — NURSING NOTE
"Chief Complaint   Patient presents with     Physical       Initial /82  Pulse 77  Temp 97.9  F (36.6  C) (Oral)  Ht 5' 10.5\" (1.791 m)  Wt 284 lb (128.8 kg)  BMI 40.17 kg/m2 Estimated body mass index is 40.17 kg/(m^2) as calculated from the following:    Height as of this encounter: 5' 10.5\" (1.791 m).    Weight as of this encounter: 284 lb (128.8 kg).  Medication Reconciliation: complete    ASAD Cotto MA    "

## 2018-01-31 NOTE — MR AVS SNAPSHOT
After Visit Summary   1/31/2018    Deepa Anderson    MRN: [id number]           Patient Information     Date Of Birth          1970        Visit Information        Provider Department      1/31/2018 8:00 AM Kehr, Kristen M, PA-C [facility]        Today's Diagnoses     Routine general medical examination at a health care facility    -  1    Visit for screening mammogram        Tubular adenoma of colon        Screening for diabetes mellitus        Abdominal pain, epigastric        Lipid screening        Perimenopause        Ganglion cyst          Care Instructions      Preventive Health Recommendations  Female Ages 40 to 49    Yearly exam:     See your health care provider every year in order to  1. Review health changes.   2. Discuss preventive care.    3. Review your medicines if your doctor prescribed any.      Get a Pap test every three years (unless you have an abnormal result and your provider advises testing more often).      If you get Pap tests with HPV test, you only need to test every 5 years, unless you have an abnormal result. You do not need a Pap test if your uterus was removed (hysterectomy) and you have not had cancer.      You should be tested each year for STDs (sexually transmitted diseases), if you're at risk.       Ask your doctor if you should have a mammogram.      Have a colonoscopy (test for colon cancer) if someone in your family has had colon cancer or polyps before age 50.       Have a cholesterol test every 5 years.       Have a diabetes test (fasting glucose) after age 45. If you are at risk for diabetes, you should have this test every 3 years.    Shots: Get a flu shot each year. Get a tetanus shot every 10 years.     Nutrition:     Eat at least 5 servings of fruits and vegetables each day.    Eat whole-grain bread, whole-wheat pasta and brown rice instead of white grains and rice.    Talk to your provider about Calcium and Vitamin D.      Lifestyle    Exercise at least 150 minutes a week (an average of 30 minutes a day, 5 days a week). This will help you control your weight and prevent disease.    Limit alcohol to one drink per day.    No smoking.     Wear sunscreen to prevent skin cancer.    See your dentist every six months for an exam and cleaning.    Schedule mammogram    Send a [employer] message 1-2 months after starting citalopram    Schedule colonoscopy    Fasting labs today      Orthopedic appointment for ganglion cyst          Follow-ups after your visit        Additional Services     GASTROENTEROLOGY ADULT REF PROCEDURE ONLY       Last Lab Result: Creatinine (mg/dL)       Date                     Value                 06/29/2016               0.69             ----------  Body mass index is 40.17 kg/(m^2).     Needed:  No  Language:  English    Patient will be contacted to schedule procedure.     Please be aware that coverage of these services is subject to the terms and limitations of your health insurance plan.  Call member services at your health plan with any benefit or coverage questions.  Any procedures must be performed at a [place] facility OR coordinated by your clinic's referral office.    Please bring the following with you to your appointment:    (1) Any X-Rays, CTs or MRIs which have been performed.  Contact the facility where they were done to arrange for  prior to your scheduled appointment.    (2) List of current medications   (3) This referral request   (4) Any documents/labs given to you for this referral            ORTHOPEDICS ADULT REFERRAL       Your provider has referred you to: [facility]: [facility] [phone]   [email or web address]    Please be aware that coverage of these services is subject to the terms and limitations of your health insurance plan.  Call member services at your health plan with any benefit or coverage questions.      Please bring the  "following to your appointment:    >>   Any x-rays, CTs or MRIs which have been performed.  Contact the facility where they were done to arrange for  prior to your scheduled appointment.    >>   List of current medications   >>   This referral request   >>   Any documents/labs given to you for this referral                  Future tests that were ordered for you today     Open Future Orders        Priority Expected Expires Ordered    MA SCREENING DIGITAL BILAT - Future  (s+30) Routine  1/31/2019 1/31/2018            Who to contact     If you have questions or need follow up information about today's clinic visit or your schedule please contact [facility] directly at [phone].  Normal or non-critical lab and imaging results will be communicated to you by MyChart, letter or phone within 4 business days after the clinic has received the results. If you do not hear from us within 7 days, please contact the clinic through [employer] or phone. If you have a critical or abnormal lab result, we will notify you by phone as soon as possible.  Submit refill requests through [employer] or call your pharmacy and they will forward the refill request to us. Please allow 3 business days for your refill to be completed.          Additional Information About Your Visit        [employer] Information     [employer] gives you secure access to your electronic health record. If you see a primary care provider, you can also send messages to your care team and make appointments. If you have questions, please call your primary care clinic.  If you do not have a primary care provider, please call [phone] and they will assist you.        Care EveryWhere ID     This is your Care EveryWhere ID. This could be used by other organizations to access your [place] medical records  [id number]        Your Vitals Were     Pulse Temperature Height BMI (Body Mass Index)          77 97.9  F (36.6  C) (Oral) 5' 10.5\" (1.791 m) 40.17 kg/m2 "         Blood Pressure from Last 3 Encounters:   01/31/18 126/80   06/29/17 114/66   06/21/17 127/82    Weight from Last 3 Encounters:   01/31/18 284 lb (128.8 kg)   06/30/17 284 lb (128.8 kg)   06/21/17 280 lb (127 kg)              We Performed the Following     GASTROENTEROLOGY ADULT REF PROCEDURE ONLY     GLUCOSE     Lipid panel reflex to direct LDL Fasting     ORTHOPEDICS ADULT REFERRAL          Today's Medication Changes          These changes are accurate as of 1/31/18  8:35 AM.  If you have any questions, ask your nurse or doctor.               Start taking these medicines.        Dose/Directions    citalopram 20 MG tablet   Commonly known as:  celeXA   Used for:  Perimenopause   Started by:  Kehr, Kristen M, PA-C        Take 1/2 tablet (10 mg) for 1 weeks, then increase to 1 tablet orally daily   Quantity:  90 tablet   Refills:  3            Where to get your medicines      These medications were sent to [facility] - [address] [place] AT [address] [place], [facility] [address]     Phone:  [phone]     citalopram 20 MG tablet    omeprazole 20 MG CR capsule                Primary Care Provider Office Phone # Fax #    Kristen M Kehr, PA-C [phone] [address] [facility] [address]        Equal Access to Services     [facility]: Hadii aad ku hadasho Soomaali, waaxda luqadaha, qaybta kaalmada adeegyada, cam petty . So [facility] [phone].    ATENCIÓN: Si habla español, tiene a segovia disposición servicios gratuitos de asistencia lingüística. Jocelin al [phone].    We comply with applicable federal civil rights laws and [place] laws. We do not discriminate on the basis of race, color, national origin, age, disability, sex, sexual orientation, or gender identity.            Thank you!     Thank you for choosing [facility]  for your care. Our goal is always to  provide you with excellent care. Hearing back from our patients is one way we can continue to improve our services. Please take a few minutes to complete the written survey that you may receive in the mail after your visit with us. Thank you!             Your Updated Medication List - Protect others around you: Learn how to safely use, store and throw away your medicines at www.disposemymeds.org.          This list is accurate as of 1/31/18  8:35 AM.  Always use your most recent med list.                   Brand Name Dispense Instructions for use Diagnosis    citalopram 20 MG tablet    celeXA    90 tablet    Take 1/2 tablet (10 mg) for 1 weeks, then increase to 1 tablet orally daily    Perimenopause       ibuprofen 200 MG tablet    ADVIL/MOTRIN     Take 200 mg by mouth every 4 hours as needed Reported on 4/17/2017        MULTI-VITAMIN PO      Take  by mouth. Takes 1 tab daily        omeprazole 20 MG CR capsule    priLOSEC    90 capsule    Take 1 capsule (20 mg) by mouth daily    Abdominal pain, epigastric

## 2018-01-31 NOTE — PATIENT INSTRUCTIONS
Preventive Health Recommendations  Female Ages 40 to 49    Yearly exam:     See your health care provider every year in order to  1. Review health changes.   2. Discuss preventive care.    3. Review your medicines if your doctor prescribed any.      Get a Pap test every three years (unless you have an abnormal result and your provider advises testing more often).      If you get Pap tests with HPV test, you only need to test every 5 years, unless you have an abnormal result. You do not need a Pap test if your uterus was removed (hysterectomy) and you have not had cancer.      You should be tested each year for STDs (sexually transmitted diseases), if you're at risk.       Ask your doctor if you should have a mammogram.      Have a colonoscopy (test for colon cancer) if someone in your family has had colon cancer or polyps before age 50.       Have a cholesterol test every 5 years.       Have a diabetes test (fasting glucose) after age 45. If you are at risk for diabetes, you should have this test every 3 years.    Shots: Get a flu shot each year. Get a tetanus shot every 10 years.     Nutrition:     Eat at least 5 servings of fruits and vegetables each day.    Eat whole-grain bread, whole-wheat pasta and brown rice instead of white grains and rice.    Talk to your provider about Calcium and Vitamin D.     Lifestyle    Exercise at least 150 minutes a week (an average of 30 minutes a day, 5 days a week). This will help you control your weight and prevent disease.    Limit alcohol to one drink per day.    No smoking.     Wear sunscreen to prevent skin cancer.    See your dentist every six months for an exam and cleaning.    Schedule mammogram    Send a [employer] message 1-2 months after starting citalopram    Schedule colonoscopy    Fasting labs today      Orthopedic appointment for ganglion cyst

## 2018-02-04 LAB
COPATH REPORT: NORMAL
PAP: NORMAL

## 2018-02-06 LAB
FINAL DIAGNOSIS: NORMAL
HPV HR 12 DNA CVX QL NAA+PROBE: NEGATIVE
HPV16 DNA SPEC QL NAA+PROBE: NEGATIVE
HPV18 DNA SPEC QL NAA+PROBE: NEGATIVE
SPECIMEN DESCRIPTION: NORMAL
SPECIMEN SOURCE CVX/VAG CYTO: NORMAL

## 2018-02-08 ENCOUNTER — OFFICE VISIT (OUTPATIENT)
Dept: ORTHOPEDICS | Facility: CLINIC | Age: 48
End: 2018-02-08
Payer: COMMERCIAL

## 2018-02-08 VITALS
WEIGHT: 286 LBS | DIASTOLIC BLOOD PRESSURE: 78 MMHG | SYSTOLIC BLOOD PRESSURE: 121 MMHG | RESPIRATION RATE: 16 BRPM | HEART RATE: 63 BPM | HEIGHT: 71 IN | BODY MASS INDEX: 40.04 KG/M2 | OXYGEN SATURATION: 97 % | TEMPERATURE: 97.4 F

## 2018-02-08 DIAGNOSIS — R22.32 MASS OF WRIST, LEFT: Primary | ICD-10-CM

## 2018-02-08 PROCEDURE — 99203 OFFICE O/P NEW LOW 30 MIN: CPT | Performed by: ORTHOPAEDIC SURGERY

## 2018-02-08 ASSESSMENT — PAIN SCALES - GENERAL: PAINLEVEL: NO PAIN (1)

## 2018-02-08 NOTE — PATIENT INSTRUCTIONS
Please remember to call and schedule a follow up appointment if one was recommended at your earliest convenience.   Orthopedics CLINIC HOURS TELEPHONE NUMBER   Ori Bob M.D.      Amy Bradford  Medical Assistant Tuesday 8 am -5 pm    Wednesday 8 am -1 pm    Thursday 8 am -5 pm   Specialty schedulers:   [phone] to make an appointment with any Specialty Provider.   Main Clinic:   [phone] to make an appointment with your primary provider   Urgent Care locations:    [facility] Monday-Friday 5 pm - 9 pm  Saturday-Sunday 9 am -5pm      Monday-Friday 11 am - 9 pm  Saturday 9 am - 5 pm [phone]     If SURGERY has been recommended, please call our Specialty Schedulers at [phone] to schedule your procedure.    If you need a medication refill, please contact your pharmacy. Please allow 3 business days for your refill to be completed.  Use [employer] (secure e-mail communication and access to your chart) to send a message or to make an appointment. Please ask how you can sign up for [employer].

## 2018-02-08 NOTE — LETTER
2018         RE: Deepa Anderson  [address] [facility] [address]        Dear Colleague,    Thank you for referring your patient, Deepa Anderson, to the [facility]. Please see a copy of my visit note below.    SUBJECTIVE:  Deepa Anderson is a 47 year old female who is seen in consultation at the request of Kristen Kehr PA-C for left wrist mass that has been present for over 1 year. The patient reports that the mass, which is located on the ulnar side of her left wrist, has been gradually getting bigger.     Present symptoms: Painful at the end of the day or if her wrist is in a certain position for long periods of time.  No pain with twisting.  She has concerns about the long term effect on her hand function.  The pain now is minor.    PAST MEDICAL HISTORY:   Past Medical History:   Diagnosis Date     Acne      Actinic keratosis 2010    porokeratosis R hand     History of atypical/dysplastic nevus     back and R thigh.     Obesity      PAST SURGICAL HISTORY:   Past Surgical History:   Procedure Laterality Date     ABDOMEN SURGERY   &      births     BIOPSY  2015    polyp removed     C  DELIVERY ONLY      x2/'02,'06     CHOLECYSTECTOMY  06/15/2016     COLONOSCOPY  2015     COMBINED ESOPHAGOSCOPY, GASTROSCOPY, DUODENOSCOPY (EGD) WITH CO2 INSUFFLATION N/A 2017    Procedure: COMBINED ESOPHAGOSCOPY, GASTROSCOPY, DUODENOSCOPY (EGD) WITH CO2 INSUFFLATION;  EGD-MASS OF NECK/ DELL;  Surgeon: Sohan Renee MD;  Location:  OR     CYSTECTOMY PILONIDAL  '05     EXCISE MASS NECK Left 2017    Procedure: EXCISE MASS NECK;;  Surgeon: Rayray Morton MD;  Location:  OR     HEAD & NECK SURGERY  2017    brachial cleft cyst     LARYNGOSCOPY WITH BIOPSY(IES) N/A 2017    Procedure: LARYNGOSCOPY WITH BIOPSY(IES);  Direct laryngoscopy with biopsy and excision of left neck mass;  Surgeon: Rayray Morton MD;  Location:  OR      "SOFT TISSUE SURGERY  2005    Pilonidal cyst surgery     TUBAL LIGATION  '06     FAMILY HISTORY:   Family History   Problem Relation Age of Onset     Arthritis Mother      Lipids Mother      Hypertension Mother      Obesity Mother      Thyroid Disease Mother      Cardiovascular Father      Hypertension Father      CEREBROVASCULAR DISEASE Father      Hypertension Brother      Hypertension Brother      CANCER No family hx of      Glaucoma No family hx of      Macular Degeneration No family hx of      DIABETES No family hx of      SOCIAL HISTORY:   Social History   Substance Use Topics     Smoking status: Light Tobacco Smoker     Packs/day: 0.50     Years: 25.00     Types: Cigarettes     Smokeless tobacco: Never Used     Alcohol use Yes      Comment: 3-4 drinks per week   Occupation: [employer], lots of [employer]/use of her hands    REVIEW OF SYSTEMS:  CONSTITUTIONAL:  NEGATIVE for fever, chills, change in weight  INTEGUMENTARY/SKIN:  NEGATIVE for worrisome rashes, moles or lesions  EYES:  NEGATIVE for vision changes or irritation  ENT/MOUTH:  NEGATIVE for ear, mouth and throat problems  RESP:  NEGATIVE for significant cough or SOB  BREAST:  NEGATIVE for masses, tenderness or discharge  CV:  NEGATIVE for chest pain, palpitations or peripheral edema  GI:  NEGATIVE for nausea, abdominal pain, heartburn, or change in bowel habits  :  Negative   MUSCULOSKELETAL:  See HPI above  NEURO:  NEGATIVE for weakness, dizziness or paresthesias  ENDOCRINE:  NEGATIVE for temperature intolerance, skin/hair changes  HEME/ALLERGY/IMMUNE:  NEGATIVE for bleeding problems  PSYCHIATRIC:  NEGATIVE for changes in mood or affect    PHYSICAL EXAM:  /78 (BP Location: Right arm, Patient Position: Sitting, Cuff Size: Adult Large)  Pulse 63  Temp 97.4  F (36.3  C)  Resp 16  Ht 1.791 m (5' 10.5\")  Wt 129.7 kg (286 lb)  SpO2 97%  BMI 40.46 kg/m2  GENERAL APPEARANCE: healthy, alert and no distress   SKIN: no suspicious lesions or " rashes  NEURO: Normal strength and tone, mentation intact and speech normal  VASCULAR: good pulses, and cappillary refill   LYMPH: no lymphadenopathy   PSYCH:  mentation appears normal and affect normal/bright  RESP: no increased work of breathing    MUSCULOSKELETAL EXAM:  LEFT WRIST:    Inspection: Mass located on the ulnar side of the left wrist just to the volar side of the distal ulna extending up to the lateral aspect of the distal ulna  Size: 2 cm in diameter  Tender: Tenderness is mainly volarly over the mass, and some snapping with downward pressure on the distal ulna.   Non-tender: No tenderness over the distal radial ulnar joint.  Consistency: Soft and compressible  Mobility: Difficult to assess  ROM: Full wrist ROM without pain  There is some intermittent snapping with ulnar to radial deviation.    ASSESSMENT:  Ulnar sided mass likely a ganglion cyst, possibly associated with TFCC tear, left wrist    PLAN:  We decided to proceed with a MRI arthrogram of the left wrist. I assured the patient the mass is unlikely to be cancer. We talked about a possible aspiration but I was unsure if I could get any aspirate out, based on exam. All questions were answered.    Return to clinic: for MRI results    DAQUAN Bob MD  Dept. Orthopedic Surgery  [facility]    This document serves as a record of the services and decisions personally performed and made by Dr. DAQUAN Bob MD. It was created on his behalf by Valentin Medley, a trained medical scribe. The creation of this record is based on the provider's personal observations and the statements of the patient. This document has been checked and approved by the attending provider.   Valentin Medley February 8, 2018 8:38 AM    Again, thank you for allowing me to participate in the care of your patient.        Sincerely,        Ori Bob MD

## 2018-02-08 NOTE — NURSING NOTE
"Chief Complaint   Patient presents with     Mass     Patient is here for left wrist mass x 1 year. Aching and weakness on the wrist. No known injury. She believes it has gotten larger and more pain associated with it.       Initial /78 (BP Location: Right arm, Patient Position: Sitting, Cuff Size: Adult Large)  Pulse 63  Temp 97.4  F (36.3  C)  Resp 16  Ht 1.791 m (5' 10.5\")  Wt 129.7 kg (286 lb)  SpO2 97%  BMI 40.46 kg/m2 Estimated body mass index is 40.46 kg/(m^2) as calculated from the following:    Height as of this encounter: 1.791 m (5' 10.5\").    Weight as of this encounter: 129.7 kg (286 lb).  Medication Reconciliation: complete   Amy Bradford MA      "

## 2018-02-08 NOTE — MR AVS SNAPSHOT
After Visit Summary   2/8/2018    Deepa Anderson    MRN: [id number]           Patient Information     Date Of Birth          1970        Visit Information        Provider Department      2/8/2018 8:00 AM Ori Bob MD [facility]        Today's Diagnoses     Mass of wrist, left    -  1      Care Instructions    Please remember to call and schedule a follow up appointment if one was recommended at your earliest convenience.   Orthopedics CLINIC HOURS TELEPHONE NUMBER   Ori Bob M.D.      Amy Bradford  Medical Assistant Tuesday 8 am -5 pm    Wednesday 8 am -1 pm    Thursday 8 am -5 pm   Specialty schedulers:   [phone] to make an appointment with any Specialty Provider.   Main Clinic:   [phone] to make an appointment with your primary provider   Urgent Care locations:    [facility] Monday-Friday 5 pm - 9 pm  Saturday-Sunday 9 am -5pm      Monday-Friday 11 am - 9 pm  Saturday 9 am - 5 pm [phone]     If SURGERY has been recommended, please call our Specialty Schedulers at [phone] to schedule your procedure.    If you need a medication refill, please contact your pharmacy. Please allow 3 business days for your refill to be completed.  Use BitLitt (secure e-mail communication and access to your chart) to send a message or to make an appointment. Please ask how you can sign up for RelateIQ.          Follow-ups after your visit        Your next 10 appointments already scheduled     Feb 09, 2018  8:15 AM CST   (Arrive by 8:00 AM)   MA SCREENING DIGITAL BILATERAL with ANDMA1   [facility] ([facility])    [address] [facility] [address] [phone]           Do not use any powder, lotion or deodorant under your arms or on your breast. If you do, we will ask you to remove it before your exam.  Wear comfortable, two-piece clothing.  If you have any allergies, tell your  "care team.  Bring any previous mammograms from other facilities or have them mailed to the breast center.              Future tests that were ordered for you today     Open Future Orders        Priority Expected Expires Ordered    MR Wrist Left w Contrast Routine  2/8/2019 2/8/2018            Who to contact     If you have questions or need follow up information about today's clinic visit or your schedule please contact [facility] [place] directly at [phone].  Normal or non-critical lab and imaging results will be communicated to you by FootballScouthart, letter or phone within 4 business days after the clinic has received the results. If you do not hear from us within 7 days, please contact the clinic through Allen Tours or phone. If you have a critical or abnormal lab result, we will notify you by phone as soon as possible.  Submit refill requests through Allen Tours or call your pharmacy and they will forward the refill request to us. Please allow 3 business days for your refill to be completed.          Additional Information About Your Visit        FootballScoutharDecisyon Information     Allen Tours gives you secure access to your electronic health record. If you see a primary care provider, you can also send messages to your care team and make appointments. If you have questions, please call your primary care clinic.  If you do not have a primary care provider, please call [phone] and they will assist you.        Care EveryWhere ID     This is your Care EveryWhere ID. This could be used by other organizations to access your [facility] medical records  [id number]        Your Vitals Were     Pulse Temperature Respirations Height Pulse Oximetry BMI (Body Mass Index)    63 97.4  F (36.3  C) 16 1.791 m (5' 10.5\") 97% 40.46 kg/m2       Blood Pressure from Last 3 Encounters:   02/08/18 121/78   01/31/18 126/80   06/29/17 114/66    Weight from Last 3 Encounters:   02/08/18 129.7 kg (286 lb)   01/31/18 128.8 kg (284 lb)   06/30/17 128.8 kg " (284 lb)               Primary Care Provider Office Phone # Fax #    Kristen M Kehr, PA-C [phone] [address] [facility] [address]        Equal Access to Services     SANDHYA IVEY : Cm melody miller sango Sozackaryali, waaxda luqadaha, qaybta kaalmada adeegyada, waxbartolo polancon scott joseph jovanny calderon. So [facility] [phone].    ATENCIÓN: Si habla español, tiene a segovia disposición servicios gratuitos de asistencia lingüística. Llame al [phone].    We comply with applicable federal civil rights laws and [place] laws. We do not discriminate on the basis of race, color, national origin, age, disability, sex, sexual orientation, or gender identity.            Thank you!     Thank you for choosing [facility]  for your care. Our goal is always to provide you with excellent care. Hearing back from our patients is one way we can continue to improve our services. Please take a few minutes to complete the written survey that you may receive in the mail after your visit with us. Thank you!             Your Updated Medication List - Protect others around you: Learn how to safely use, store and throw away your medicines at www.disposemymeds.org.          This list is accurate as of 2/8/18  8:59 AM.  Always use your most recent med list.                   Brand Name Dispense Instructions for use Diagnosis    citalopram 20 MG tablet    celeXA    90 tablet    Take 1/2 tablet (10 mg) for 1 weeks, then increase to 1 tablet orally daily    Perimenopause       ibuprofen 200 MG tablet    ADVIL/MOTRIN     Take 200 mg by mouth every 4 hours as needed Reported on 4/17/2017        MULTI-VITAMIN PO      Take  by mouth. Takes 1 tab daily        omeprazole 20 MG CR capsule    priLOSEC    90 capsule    Take 1 capsule (20 mg) by mouth daily    Abdominal pain, epigastric

## 2018-02-08 NOTE — PROGRESS NOTES
SUBJECTIVE:  Deepa Anderson is a 47 year old female who is seen in consultation at the request of Kristen Kehr PA-C for left wrist mass that has been present for over 1 year. The patient reports that the mass, which is located on the ulnar side of her left wrist, has been gradually getting bigger.     Present symptoms: Painful at the end of the day or if her wrist is in a certain position for long periods of time.  No pain with twisting.  She has concerns about the long term effect on her hand function.  The pain now is minor.    PAST MEDICAL HISTORY:   Past Medical History:   Diagnosis Date     Acne      Actinic keratosis 2010    porokeratosis R hand     History of atypical/dysplastic nevus     back and R thigh.     Obesity      PAST SURGICAL HISTORY:   Past Surgical History:   Procedure Laterality Date     ABDOMEN SURGERY   &      births     BIOPSY  2015    polyp removed     C  DELIVERY ONLY      x2/'02,'06     CHOLECYSTECTOMY  06/15/2016     COLONOSCOPY  2015     COMBINED ESOPHAGOSCOPY, GASTROSCOPY, DUODENOSCOPY (EGD) WITH CO2 INSUFFLATION N/A 2017    Procedure: COMBINED ESOPHAGOSCOPY, GASTROSCOPY, DUODENOSCOPY (EGD) WITH CO2 INSUFFLATION;  EGD-MASS OF NECK/ DELL;  Surgeon: Sohan Renee MD;  Location: [facility] OR     CYSTECTOMY PILONIDAL  '     EXCISE MASS NECK Left 2017    Procedure: EXCISE MASS NECK;;  Surgeon: Rayray Morton MD;  Location: [facility] OR     HEAD & NECK SURGERY  2017    brachial cleft cyst     LARYNGOSCOPY WITH BIOPSY(IES) N/A 2017    Procedure: LARYNGOSCOPY WITH BIOPSY(IES);  Direct laryngoscopy with biopsy and excision of left neck mass;  Surgeon: Rayray Morton MD;  Location: [facility] OR     SOFT TISSUE SURGERY      Pilonidal cyst surgery     TUBAL LIGATION  '06     FAMILY HISTORY:   Family History   Problem Relation Age of Onset     Arthritis Mother      Lipids Mother      Hypertension Mother      Obesity Mother      Thyroid  "Disease Mother      Cardiovascular Father      Hypertension Father      CEREBROVASCULAR DISEASE Father      Hypertension Brother      Hypertension Brother      CANCER No family hx of      Glaucoma No family hx of      Macular Degeneration No family hx of      DIABETES No family hx of      SOCIAL HISTORY:   Social History   Substance Use Topics     Smoking status: Light Tobacco Smoker     Packs/day: 0.50     Years: 25.00     Types: Cigarettes     Smokeless tobacco: Never Used     Alcohol use Yes      Comment: 3-4 drinks per week   Occupation: , 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    REVIEW OF SYSTEMS:  CONSTITUTIONAL:  NEGATIVE for fever, chills, change in weight  INTEGUMENTARY/SKIN:  NEGATIVE for worrisome rashes, moles or lesions  EYES:  NEGATIVE for vision changes or irritation  ENT/MOUTH:  NEGATIVE for ear, mouth and throat problems  RESP:  NEGATIVE for significant cough or SOB  BREAST:  NEGATIVE for masses, tenderness or discharge  CV:  NEGATIVE for chest pain, palpitations or peripheral edema  GI:  NEGATIVE for nausea, abdominal pain, heartburn, or change in bowel habits  :  Negative   MUSCULOSKELETAL:  See HPI above  NEURO:  NEGATIVE for weakness, dizziness or paresthesias  ENDOCRINE:  NEGATIVE for temperature intolerance, skin/hair changes  HEME/ALLERGY/IMMUNE:  NEGATIVE for bleeding problems  PSYCHIATRIC:  NEGATIVE for changes in mood or affect    PHYSICAL EXAM:  /78 (BP Location: Right arm, Patient Position: Sitting, Cuff Size: Adult Large)  Pulse 63  Temp 97.4  F (36.3  C)  Resp 16  Ht 1.791 m (5' 10.5\")  Wt 129.7 kg (286 lb)  SpO2 97%  BMI 40.46 kg/m2  GENERAL APPEARANCE: healthy, alert and no distress   SKIN: no suspicious lesions or rashes  NEURO: Normal strength and tone, mentation intact and speech normal  VASCULAR: good pulses, and cappillary refill   LYMPH: no lymphadenopathy   PSYCH:  mentation appears normal and affect normal/bright  RESP: no increased work of " breathing    MUSCULOSKELETAL EXAM:  LEFT WRIST:    Inspection: Mass located on the ulnar side of the left wrist just to the volar side of the distal ulna extending up to the lateral aspect of the distal ulna  Size: 2 cm in diameter  Tender: Tenderness is mainly volarly over the mass, and some snapping with downward pressure on the distal ulna.   Non-tender: No tenderness over the distal radial ulnar joint.  Consistency: Soft and compressible  Mobility: Difficult to assess  ROM: Full wrist ROM without pain  There is some intermittent snapping with ulnar to radial deviation.    ASSESSMENT:  Ulnar sided mass likely a ganglion cyst, possibly associated with TFCC tear, left wrist    PLAN:  We decided to proceed with a MRI arthrogram of the left wrist. I assured the patient the mass is unlikely to be cancer. We talked about a possible aspiration but I was unsure if I could get any aspirate out, based on exam. All questions were answered.    Return to clinic: for MRI results    DAQUAN Bob MD  Dept. Orthopedic Surgery  [facility]    This document serves as a record of the services and decisions personally performed and made by Dr. DAQUAN Bob MD. It was created on his behalf by Valentin Medley, a trained medical scribe. The creation of this record is based on the provider's personal observations and the statements of the patient. This document has been checked and approved by the attending provider.   Valentin Medley February 8, 2018 8:38 AM

## 2018-02-09 ENCOUNTER — RADIANT APPOINTMENT (OUTPATIENT)
Dept: MAMMOGRAPHY | Facility: CLINIC | Age: 48
End: 2018-02-09
Attending: PHYSICIAN ASSISTANT
Payer: COMMERCIAL

## 2018-02-09 DIAGNOSIS — Z12.31 VISIT FOR SCREENING MAMMOGRAM: ICD-10-CM

## 2018-02-09 PROCEDURE — 77067 SCR MAMMO BI INCL CAD: CPT | Mod: TC

## 2018-02-21 ENCOUNTER — HOSPITAL ENCOUNTER (OUTPATIENT)
Dept: GENERAL RADIOLOGY | Facility: CLINIC | Age: 48
Discharge: HOME | End: 2018-02-21
Attending: ORTHOPAEDIC SURGERY | Admitting: ORTHOPAEDIC SURGERY
Payer: COMMERCIAL

## 2018-02-21 ENCOUNTER — HOSPITAL ENCOUNTER (OUTPATIENT)
Dept: MRI IMAGING | Facility: CLINIC | Age: 48
End: 2018-02-21
Attending: ORTHOPAEDIC SURGERY
Payer: COMMERCIAL

## 2018-02-21 DIAGNOSIS — R22.32 MASS OF WRIST, LEFT: ICD-10-CM

## 2018-02-21 PROCEDURE — 25500064 ZZH RX 255 OP 636: Performed by: RADIOLOGY

## 2018-02-21 PROCEDURE — A9579 GAD-BASE MR CONTRAST NOS,1ML: HCPCS | Performed by: RADIOLOGY

## 2018-02-21 PROCEDURE — 25246 INJECTION FOR WRIST X-RAY: CPT | Mod: LT

## 2018-02-21 PROCEDURE — 73222 MRI JOINT UPR EXTREM W/DYE: CPT | Mod: LT

## 2018-02-21 RX ORDER — IOPAMIDOL 408 MG/ML
10 INJECTION, SOLUTION INTRATHECAL ONCE
Status: COMPLETED | OUTPATIENT
Start: 2018-02-21 | End: 2018-02-21

## 2018-02-21 RX ADMIN — GADOPENTETATE DIMEGLUMINE 0.01 ML: 469.01 INJECTION INTRAVENOUS at 15:37

## 2018-02-21 RX ADMIN — IOPAMIDOL 0.01 ML: 408 INJECTION, SOLUTION INTRATHECAL at 15:37

## 2018-02-28 ENCOUNTER — OFFICE VISIT (OUTPATIENT)
Dept: ORTHOPEDICS | Facility: CLINIC | Age: 48
End: 2018-02-28
Payer: COMMERCIAL

## 2018-02-28 VITALS — WEIGHT: 286 LBS | BODY MASS INDEX: 40.04 KG/M2 | HEIGHT: 71 IN | RESPIRATION RATE: 16 BRPM

## 2018-02-28 DIAGNOSIS — M24.132 DEGENERATIVE TEAR OF TRIANGULAR FIBROCARTILAGE COMPLEX (TFCC) OF LEFT WRIST: Primary | ICD-10-CM

## 2018-02-28 DIAGNOSIS — S52.612K: ICD-10-CM

## 2018-02-28 PROCEDURE — 99213 OFFICE O/P EST LOW 20 MIN: CPT | Performed by: ORTHOPAEDIC SURGERY

## 2018-02-28 ASSESSMENT — PAIN SCALES - GENERAL: PAINLEVEL: MILD PAIN (3)

## 2018-02-28 NOTE — MR AVS SNAPSHOT
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   Name  Sofiya Leon   Administrative Phone  [phone]  Administrative Fax Number  [phone]  Additional Locations  Practices:  [facility]   Orthopaedic Clinic  , Department of Orthopaedic Surgery   Orthopaedic Surgeon  [place], [place]  Summary  Dr. Hathaway is an  in the Department of Orthopaedic Surgery. 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 Her clinical interests include elbow, wrist, and hand fractures, elbow, wrist and hand arthritis, Dupuytren s, nerve and tendon injuries, microsurgery, pediatric fractures and congenital hand abnormalities.  Expertise  Orthopaedics   Pediatric Orthopaedics  Research  Research Summary/Interests  [facility] osteoarthritis   Distal radius fractures   Congenital hand abnormalities   Ultrasonography and carpal tunnel syndrome   Dupuytren s   Mucopolysaccharidosis   Intra-articular injections  Clinical  Specialties  Hand and Upper Extremity SurgeryFractures and  mal/non-unionsArthritisCongenital hand deformitiesNerve compression syndromesNerve and/or tendon injuriesPediatric upper extremity disorders   Board Certifications  Board Eligible in Orthopaedic Surgery  Clinical Interests  Hand and Upper Extremity Surgery; Fractures and mal/non-unions; Arthritis; Congenital hand deformities; Nerve compression syndromes; Nerve and/or tendon injuries; Pediatric upper extremity disorders  Hospital Privileges  [facility]; [facility]      Please remember to call and schedule a follow up appointment if one was recommended at your earliest convenience.   Orthopedics CLINIC HOURS TELEPHONE NUMBER   Ori Bob M.D.      Amy Bradford  Medical Assistant Tuesday 8 am -5 pm    Wednesday 8 am -1 pm    Thursday 8 am -5 pm   Specialty schedulers:   [phone] to make an appointment with any Specialty Provider.   Main Clinic:   [phone] to make an appointment with your primary provider   Urgent Care locations:    [facility] Monday-Friday 5 pm - 9 pm  Saturday-Sunday 9 am -5pm      Monday-Friday 11 am - 9 pm  Saturday 9 am - 5 pm [phone]     If SURGERY has been recommended, please call our Specialty Schedulers at [phone] to schedule your procedure.    If you need a medication refill, please contact your pharmacy. Please allow 3 business days for your refill to be completed.  Use Vaughn Burton (secure e-mail communication and access to your chart) to send a message or to make an appointment. Please ask how you can sign up for Vaughn Burton.          Follow-ups after your visit        Who to contact     If you have questions or need follow up information about today's clinic visit or your schedule please contact [facility] directly at [phone].  Normal or non-critical lab and imaging results will be communicated to you by MyChart, letter or phone within 4  "business days after the clinic has received the results. If you do not hear from us within 7 days, please contact the clinic through WineNice or phone. If you have a critical or abnormal lab result, we will notify you by phone as soon as possible.  Submit refill requests through WineNice or call your pharmacy and they will forward the refill request to us. Please allow 3 business days for your refill to be completed.          Additional Information About Your Visit        VestiageharInnovative Card Solutions Information     WineNice gives you secure access to your electronic health record. If you see a primary care provider, you can also send messages to your care team and make appointments. If you have questions, please call your primary care clinic.  If you do not have a primary care provider, please call [phone] and they will assist you.        Care EveryWhere ID     This is your Care EveryWhere ID. This could be used by other organizations to access your [facility] medical records  [id number]        Your Vitals Were     Respirations Height BMI (Body Mass Index)             16 1.791 m (5' 10.5\") 40.46 kg/m2          Blood Pressure from Last 3 Encounters:   02/08/18 121/78   01/31/18 126/80   06/29/17 114/66    Weight from Last 3 Encounters:   02/28/18 129.7 kg (286 lb)   02/08/18 129.7 kg (286 lb)   01/31/18 128.8 kg (284 lb)              Today, you had the following     No orders found for display       Primary Care Provider Office Phone # Fax #    Kristen M Kehr, PA-C [phone] [address] [facility] [address]        Equal Access to Services     [facility]: Hadii aad ku hadasho Soomaali, waaxda luqadaha, qaybta kaalmada brigida, cam petty . So [facility] [phone].    ATENCIÓN: Si habla español, tiene a segovia disposición servicios gratuitos de asistencia lingüística. Llame al [phone].    We comply with applicable federal civil rights laws and [place] laws. We do not " discriminate on the basis of race, color, national origin, age, disability, sex, sexual orientation, or gender identity.            Thank you!     Thank you for choosing [facility]  for your care. Our goal is always to provide you with excellent care. Hearing back from our patients is one way we can continue to improve our services. Please take a few minutes to complete the written survey that you may receive in the mail after your visit with us. Thank you!             Your Updated Medication List - Protect others around you: Learn how to safely use, store and throw away your medicines at www.disposemymeds.org.          This list is accurate as of 2/28/18 11:20 AM.  Always use your most recent med list.                   Brand Name Dispense Instructions for use Diagnosis    citalopram 20 MG tablet    celeXA    90 tablet    Take 1/2 tablet (10 mg) for 1 weeks, then increase to 1 tablet orally daily    Perimenopause       ibuprofen 200 MG tablet    ADVIL/MOTRIN     Take 200 mg by mouth every 4 hours as needed Reported on 4/17/2017        MULTI-VITAMIN PO      Take  by mouth. Takes 1 tab daily        omeprazole 20 MG CR capsule    priLOSEC    90 capsule    Take 1 capsule (20 mg) by mouth daily    Abdominal pain, epigastric

## 2018-02-28 NOTE — PATIENT INSTRUCTIONS
Reina Hathaway MD  , Department of Orthopaedic Surgery     [email or web address]   Office Phone [phone]  Fax [phone]  Office Address:  [address] [place], [place] [address] [place] [address]  Mailing Address:  [facility]  Department of Orthopaedic Surgery  [address] [place] [address]  [place]   Name  Sofiya Leon   Administrative Phone  [phone]  Administrative Fax Number  [phone]  Additional Locations  Practices:  [facility]   Orthopaedic Clinic  , Department of Orthopaedic Surgery   Orthopaedic Surgeon  [place], [place]  Summary  Dr. Hathaway is an  in the Department of Orthopaedic Surgery. 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 Her clinical interests include elbow, wrist, and hand fractures, elbow, wrist and hand arthritis, Dupuytren s, nerve and tendon injuries, microsurgery, pediatric fractures and congenital hand abnormalities.  Expertise  Orthopaedics   Pediatric Orthopaedics  Research  Research Summary/Interests  [facility] osteoarthritis   Distal radius fractures   Congenital hand abnormalities   Ultrasonography and carpal tunnel syndrome   Dupuytren s   Mucopolysaccharidosis   Intra-articular injections  Clinical  Specialties  Hand and Upper Extremity SurgeryFractures and mal/non-unionsArthritisCongenital hand deformitiesNerve compression syndromesNerve and/or tendon injuriesPediatric upper extremity disorders   Board Certifications  Board Eligible in Orthopaedic Surgery  Clinical Interests  Hand and Upper Extremity Surgery; Fractures and mal/non-unions; Arthritis; Congenital hand deformities; Nerve compression syndromes; Nerve and/or tendon injuries; Pediatric upper extremity  disorders  Hospital Privileges  [facility]; [facility]      Please remember to call and schedule a follow up appointment if one was recommended at your earliest convenience.   Orthopedics CLINIC HOURS TELEPHONE NUMBER   Ori Bob M.D.      Amy Bradford  Medical Assistant Tuesday 8 am -5 pm    Wednesday 8 am -1 pm    Thursday 8 am -5 pm   Specialty schedulers:   [phone] to make an appointment with any Specialty Provider.   Main Clinic:   [phone] to make an appointment with your primary provider   Urgent Care locations:    [facility] Monday-Friday 5 pm - 9 pm  Saturday-Sunday 9 am -5pm      Monday-Friday 11 am - 9 pm  Saturday 9 am - 5 pm [phone]     If SURGERY has been recommended, please call our Specialty Schedulers at [phone] to schedule your procedure.    If you need a medication refill, please contact your pharmacy. Please allow 3 business days for your refill to be completed.  Use BigEvidence (secure e-mail communication and access to your chart) to send a message or to make an appointment. Please ask how you can sign up for BigEvidence.

## 2018-02-28 NOTE — NURSING NOTE
"Chief Complaint   Patient presents with     Results     MRI arthrogram results of the LEFT WRIST       Initial Resp 16  Ht 1.791 m (5' 10.5\")  Wt 129.7 kg (286 lb)  BMI 40.46 kg/m2 Estimated body mass index is 40.46 kg/(m^2) as calculated from the following:    Height as of this encounter: 1.791 m (5' 10.5\").    Weight as of this encounter: 129.7 kg (286 lb).  Medication Reconciliation: complete   Amy Bradford MA      "

## 2018-03-14 ENCOUNTER — OFFICE VISIT (OUTPATIENT)
Dept: FAMILY MEDICINE | Facility: CLINIC | Age: 48
End: 2018-03-14
Payer: COMMERCIAL

## 2018-03-14 VITALS
OXYGEN SATURATION: 99 % | DIASTOLIC BLOOD PRESSURE: 92 MMHG | HEART RATE: 80 BPM | BODY MASS INDEX: 40.04 KG/M2 | WEIGHT: 286 LBS | SYSTOLIC BLOOD PRESSURE: 149 MMHG | HEIGHT: 71 IN

## 2018-03-14 DIAGNOSIS — S39.012A LUMBAR STRAIN, INITIAL ENCOUNTER: Primary | ICD-10-CM
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
## 2018-03-14 PROCEDURE — 99214 OFFICE O/P EST MOD 30 MIN: CPT | Performed by: FAMILY MEDICINE

## 2018-03-14 RX ORDER — CYCLOBENZAPRINE HCL 5 MG
5 TABLET ORAL 3 TIMES DAILY PRN
Qty: 42 TABLET | Refills: 0 | Status: SHIPPED | OUTPATIENT
Start: 2018-03-14 | End: 2018-12-06

## 2018-03-14 RX ORDER — IBUPROFEN 600 MG/1
600 TABLET, FILM COATED ORAL EVERY 6 HOURS PRN
Qty: 30 TABLET | Refills: 0 | COMMUNITY
Start: 2018-03-14 | End: 2018-12-06
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
## 2018-03-14 NOTE — PATIENT INSTRUCTIONS
Back Sprain or Strain    Injury to the muscles (strain) or ligaments (sprain) around the spine can be troubling. Injury may occur after a sudden forceful twisting or bending force such as in a car accident, after a simple awkward movement, or after lifting something heavy with poor body positioning. In any case, muscle spasm is often present and adds to the pain.  Thankfully, most people feel better in 1 to 2 weeks, and most of the rest in 1 to 2 months. Most people can remain active. Unless you had a forceful or traumatic physical injury such as a car accident or fall, X-rays may not be ordered for the first evaluation of a back sprain or strain. If pain continues and does not respond to medical treatment, your healthcare provider may then order X-rays and other tests.  Home care  The following guidelines will help you care for your injury at home:    When in bed, try to find a comfortable position. A firm mattress is best. Try lying flat on your back with pillows under your knees. You can also try lying on your side with your knees bent up toward your chest and a pillow between your knees.    Don't sit for long periods. Try not to take long car rides or take other trips that have you sitting for a long time. This puts more stress on the lower back than standing or walking.    During the first 24 to 72 hours after an injury or flare-up, apply an ice pack to the painful area for 20 minutes. Then remove it for 20 minutes. Do this for 60 to 90 minutes, or several times a day. This will reduce swelling and pain. Be sure to wrap the ice pack in a thin towel or plastic to protect your skin.    You can start with ice, then switch to heat. Heat from a hot shower, hot bath, or heating pad reduces pain and works well for muscle spasms. Put heat on the painful area for 20 minutes, then remove for 20 minutes. Do this for 60 to 90 minutes, or several times a day. Do not use a heating pad while sleeping. It can burn the  skin.    You can alternate the ice and heat. Talk with your healthcare provider to find out the best treatment or therapy for your back pain.    Therapeutic massage will help relax the back muscles without stretching them.    Be aware of safe lifting methods. Do not lift anything over 15 pounds until all of the pain is gone.  Medicines  Talk to your healthcare provider before using medicines, especially if you have other health problems or are taking other medicines.    You may use acetaminophen or ibuprofen to control pain, unless another pain medicine was prescribed. If you have chronic conditions like diabetes, liver or kidney disease, stomach ulcers, or gastrointestinal bleeding, or are taking blood-thinner medicines, talk with your doctor before taking any medicines.    Be careful if you are given prescription medicines, narcotics, or medicine for muscle spasm. They can cause drowsiness, and affect your coordination, reflexes, and judgment. Do not drive or operate heavy machinery when taking these types of medicines. Only take pain medicine as prescribed by your healthcare provider.  Follow-up care  Follow up with your healthcare provider, or as advised. You may need physical therapy or more tests if your symptoms get worse.  If you had X-rays your healthcare provider may be checking for any broken bones, breaks, or fractures. Bruises and sprains can sometimes hurt as much as a fracture. These injuries can take time to heal completely. If your symptoms don t improve or they get worse, talk with your healthcare provider. You may need a repeat X-ray or other tests.  Call 911  Call for emergency care if any of the following occur:    Trouble breathing    Confused    Very drowsy or trouble awakening    Fainting or loss of consciousness    Rapid or very slow heart rate    Loss of bowel or bladder control  When to seek medical advice  Call your healthcare provider right away if any of the following occur:    Pain  gets worse or spreads to your arms or legs    Weakness or numbness in one or both arms or legs    Numbness in the groin or genital area  Date Last Reviewed: 6/1/2016 2000-2017 [employer]. [address] [place] [address], [place] [address]. All rights reserved. This information is not intended as a substitute for professional medical care. Always follow your healthcare professional's instructions.
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
## 2018-03-19 ENCOUNTER — OFFICE VISIT (OUTPATIENT)
Dept: ORTHOPEDICS | Facility: CLINIC | Age: 48
End: 2018-03-19
Payer: COMMERCIAL

## 2018-03-19 ENCOUNTER — RADIANT APPOINTMENT (OUTPATIENT)
Dept: GENERAL RADIOLOGY | Facility: CLINIC | Age: 48
End: 2018-03-19
Attending: ORTHOPAEDIC SURGERY
Payer: COMMERCIAL

## 2018-03-19 VITALS
WEIGHT: 286 LBS | SYSTOLIC BLOOD PRESSURE: 118 MMHG | HEART RATE: 67 BPM | BODY MASS INDEX: 40.04 KG/M2 | OXYGEN SATURATION: 98 % | DIASTOLIC BLOOD PRESSURE: 80 MMHG | HEIGHT: 71 IN

## 2018-03-19 DIAGNOSIS — S52.612K: Primary | ICD-10-CM

## 2018-03-19 DIAGNOSIS — S69.92XA WRIST INJURY, LEFT, INITIAL ENCOUNTER: Primary | ICD-10-CM

## 2018-03-19 DIAGNOSIS — S69.92XA WRIST INJURY, LEFT, INITIAL ENCOUNTER: ICD-10-CM

## 2018-03-19 PROCEDURE — 99214 OFFICE O/P EST MOD 30 MIN: CPT | Mod: 25 | Performed by: ORTHOPAEDIC SURGERY

## 2018-03-19 PROCEDURE — 73110 X-RAY EXAM OF WRIST: CPT | Mod: LT | Performed by: RADIOLOGY

## 2018-03-19 PROCEDURE — 20605 DRAIN/INJ JOINT/BURSA W/O US: CPT | Mod: LT | Performed by: ORTHOPAEDIC SURGERY

## 2018-03-19 ASSESSMENT — PAIN SCALES - GENERAL: PAINLEVEL: NO PAIN (1)
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
## 2018-03-19 NOTE — PROGRESS NOTES
Date of Service: Mar 19, 2018    Chief Complaint: Left wrist lump and pain    History of Present Illness: Deepa Anderson is a 47 year old, right handed female who presents today for further evaluation of left TFCC tear. The patient reports she has had a mass on the ulnar aspect of her left wrist for over the past year. She says the mass is not painful to palpation, but notes she does have some discomfort when she makes a twisting motion while holding anything with weight starting 3-4 months ago. She denies any history of trauma or injury. The patient saw Dr. Bob who subsequently ordered an MRI to further evaluate the mass and referred the patient to me after MRI revealed full thickness tear of the TFCC. The patient cannot remember any specific activity led to the increased discomfort a few months ago. She denies any numbness or tingling.  She has had no injections, splinting, or hand therapy.      Review of Systems: A 14-point review of systems was obtained on the intake form and scanned into the medical record.  Pertinent positives include tobacco use and otherwise review of systems is negative.    Past Medical History:  Past Medical History:   Diagnosis Date     Acne      Actinic keratosis 2010    porokeratosis R hand     History of atypical/dysplastic nevus     back and R thigh.     Obesity      Past Surgical History:  Past Surgical History:   Procedure Laterality Date     ABDOMEN SURGERY   &      births     BIOPSY  2015    polyp removed     C  DELIVERY ONLY      x2/'02,'06     CHOLECYSTECTOMY  06/15/2016     COLONOSCOPY  2015     COMBINED ESOPHAGOSCOPY, GASTROSCOPY, DUODENOSCOPY (EGD) WITH CO2 INSUFFLATION N/A 2017    Procedure: COMBINED ESOPHAGOSCOPY, GASTROSCOPY, DUODENOSCOPY (EGD) WITH CO2 INSUFFLATION;  EGD-MASS OF NECK/ DELL;  Surgeon: Sohan Renee MD;  Location: [facility] OR     CYSTECTOMY PILONIDAL  '05     EXCISE MASS NECK Left 2017    Procedure:  EXCISE MASS NECK;;  Surgeon: Rayray Morton MD;  Location: [facility] OR     HEAD & NECK SURGERY  June 2017    brachial cleft cyst     LARYNGOSCOPY WITH BIOPSY(IES) N/A 6/29/2017    Procedure: LARYNGOSCOPY WITH BIOPSY(IES);  Direct laryngoscopy with biopsy and excision of left neck mass;  Surgeon: Rayray Morton MD;  Location: [facility] OR     SOFT TISSUE SURGERY  2005    Pilonidal cyst surgery     TUBAL LIGATION  '06     MEDICATIONS:    Current Outpatient Prescriptions:      ibuprofen (ADVIL/MOTRIN) 600 MG tablet, Take 1 tablet (600 mg) by mouth every 6 hours as needed for moderate pain, Disp: 30 tablet, Rfl: 0     cyclobenzaprine (FLEXERIL) 5 MG tablet, Take 1 tablet (5 mg) by mouth 3 times daily as needed, Disp: 42 tablet, Rfl: 0     omeprazole (PRILOSEC) 20 MG CR capsule, Take 1 capsule (20 mg) by mouth daily, Disp: 90 capsule, Rfl: 3     citalopram (CELEXA) 20 MG tablet, Take 1/2 tablet (10 mg) for 1 weeks, then increase to 1 tablet orally daily (Patient not taking: Reported on 3/14/2018), Disp: 90 tablet, Rfl: 3     Multiple Vitamin (MULTI-VITAMIN PO), Take  by mouth. Takes 1 tab daily, Disp: , Rfl:      ibuprofen (ADVIL,MOTRIN) 200 MG tablet, Take 200 mg by mouth every 4 hours as needed Reported on 4/17/2017, Disp: , Rfl:     ALLERGIES:  Allergies   Allergen Reactions     Percodan [Aspirin]      Social History:  Patient lives with  and children.  Works as .  Positive tobacco use.  Occasional alcohol use.      Family History:  Family History   Problem Relation Age of Onset     Arthritis Mother      Lipids Mother      Hypertension Mother      Obesity Mother      Thyroid Disease Mother      Cardiovascular Father      Hypertension Father      CEREBROVASCULAR DISEASE Father      Hypertension Brother      Hypertension Brother      CANCER No family hx of      Glaucoma No family hx of      Macular Degeneration No family hx of      DIABETES No family hx of        Physical examination:  VITALS: BP  "118/80  Pulse 67  Ht 1.791 m (5' 10.5\")  Wt 129.7 kg (286 lb)  SpO2 98%  BMI 40.46 kg/m2  Pain is rated 1 out of 10 on the visual analog scale.  QUICKDASH: 13.64   Strength: The patient's  strength at 3 levels is (50,65,60) pounds on the right, versus (60,65,55) pounds on the left.   GENERAL: Healthy-appearing adult female in no acute distress.  Alert and oriented times three.  HEENT: Head normocephalic and atraumatic.  Extra-ocular movements intact.  Neck: Full range of motion without pain.  Respiratory: Breathing regular and non-labored.  Left upper extremity: Full shoulder, elbow, forearm, and wrist range of motion. Palpable deformity over the ulnar wrist. Tenderness over the ulnar styloid. No tenderness of the ECU. Mild translation of the ulna relative to the radius with pronation of her extremity. Stable with supination. Negative for Tinel's at the wrist. Negative for carpal compression. Negative for Phalen's. 5/5 finger abduction, EPL, and FPL are intact. Patient's neurovascular exam is intact and all digits are warm and well perfused with capillary refill in less than 2 seconds.  Skin: Intact without any rashes or abrasions.    Radiographs: Three views of the left wrist were obtained today and independently reviewed. These demonstrated a ulnar styloid fracture with ossification suggestive of old injury.    ELECTRODIAGNOSTIC STUDIES: An electrodiagnostic study of the left upper extremity was available for review today, dated 2/21/18.  This showed full thickness tear of the triangular fibrocartilage, old ununited ulnar styloid fracture, mild tenosynovitis of the extensor tendons dorsal and radial to the distal radius.    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    Plan: Mrs. Anderson and I discussed her symptoms and possible treatment options. I explained that based on her radiographs, I believe she sustained an ulnar styloid " fracture at a young age that has since ossified and remained stable. Further, her MRI confirms this is the case and displays she has a mild tear in TFCC, which is not uncommon, and likely not the entire source of her pain. There is also the possibility that her ECU tendon is becoming irritated and inflamed by its proximity to her previously fractured ulnar styloid. The question is why she has developed these symptoms recently and I am not certain there is a clear explanation for this, although she may have aggravated one of these sources unknowingly in the past few months. We discussed multiple treatment plans and their possible results. I explained she could wear a brace with activities and observe her symptoms to determine whether intervention is warranted based on her degree of discomfort. She could also consider having a corticosteroid injection into her ulnar wrist as a diagnostic tool to determine if surgical intervention would be successful in relieving her pain. Finally, we discussed proceeding with surgical intervention to remove the fractured piece of her ulnar styloid and repair her TFCC. I explained because her discomfort does not have a clear source I cannot guarantee surgical intervention would result in complete relief of symptoms. At this time, she will proceed with the steroid injection. I encouraged her to monitor her symptoms closely over the next 4-6 weeks to decide if the injection is a success. She will follow-up with me in 6 weeks to discuss her progress and further treatment. She is agreeable to the plan and all of her questions were answered at this time.     Procedure: After written informed consent was obtained, the patient's left wrist was prepped with chloraprep.  A combination of 20 mg of Depo-Medrol and 1cc 1% lidocaine were injected into the left ulnar styloid nonunion site. There were no immediate complications.  Patient tolerated the procedure well and a band-aid was applied.    I,  Reina Hathaway MD, have reviewed the above note and agree with the scribe's notation as written.   I, Sanjay Patel, am serving as a scribe to document services personally performed by Reina Hathaway MD, based upon my observations and the provider's statements to me. All documentation has been reviewed by the aforementioned doctor prior to being entered into the official medical record.

## 2018-03-19 NOTE — PATIENT INSTRUCTIONS
Thanks for coming today.  Ortho/Sports Medicine Clinic  [address] [place] [address]    To schedule future appointments in Ortho Clinic, you may call [phone].    To schedule ordered imaging by your provider:   Call Central Imaging [phone]    To schedule an injection ordered by your provider:  Call Central Imaging Injection scheduling line: [phone]  [email or web address] available online at:  [email or web address]    Please call if any further questions or concerns ([phone]).  Clinic hours 8 am to 5 pm.    Return to clinic (call) if symptoms worsen or fail to improve.

## 2018-03-19 NOTE — NURSING NOTE
"Deepa Anderson's goals for this visit include: Evaluate left wrist TFCC tear  She requests these members of her care team be copied on today's visit information: yes    PCP: Kehr, Kristen M    Referring Provider:  No referring provider defined for this encounter.    Chief Complaint   Patient presents with     Consult     Left TFCC tear       Initial /80  Pulse 67  Ht 1.791 m (5' 10.5\")  Wt 129.7 kg (286 lb)  SpO2 98%  BMI 40.46 kg/m2 Estimated body mass index is 40.46 kg/(m^2) as calculated from the following:    Height as of this encounter: 1.791 m (5' 10.5\").    Weight as of this encounter: 129.7 kg (286 lb).  Medication Reconciliation: complete     Hand Evaluation    Pain Score (1-10): No Pain (1) (Left )  Hand Dominance: Right  QuickDASH Disability Score: 13.64            Force:   R hand  level 1 force: 22.7 kg (50 lb)  R hand  level 3 force: 29.5 kg (65 lb)  R hand  level 5 force: 29.5 kg (65 lb)  L hand   level 1 force: 27.2 kg (60 lb)  L hand  level 3 force: 29.5 kg (65 lb)  L hand  level 5 force: 24.9 kg (55 lb)        "

## 2018-04-17 ENCOUNTER — MYC MEDICAL ADVICE (OUTPATIENT)
Dept: FAMILY MEDICINE | Facility: CLINIC | Age: 48
End: 2018-04-17

## 2018-04-17 NOTE — TELEPHONE ENCOUNTER
Per protocol, will route encounter to be cosigned by provider for Verbal Orders.  Shireen Short RN

## 2018-04-23 ENCOUNTER — OFFICE VISIT (OUTPATIENT)
Dept: ORTHOPEDICS | Facility: CLINIC | Age: 48
End: 2018-04-23
Payer: COMMERCIAL

## 2018-04-23 VITALS — OXYGEN SATURATION: 97 % | DIASTOLIC BLOOD PRESSURE: 80 MMHG | SYSTOLIC BLOOD PRESSURE: 116 MMHG | HEART RATE: 75 BPM

## 2018-04-23 DIAGNOSIS — M77.8 TENDINITIS OF LEFT WRIST: ICD-10-CM

## 2018-04-23 DIAGNOSIS — M24.132 DEGENERATIVE TEAR OF TRIANGULAR FIBROCARTILAGE COMPLEX (TFCC) OF LEFT WRIST: Primary | ICD-10-CM

## 2018-04-23 PROCEDURE — 99213 OFFICE O/P EST LOW 20 MIN: CPT | Performed by: ORTHOPAEDIC SURGERY

## 2018-04-23 ASSESSMENT — PAIN SCALES - GENERAL: PAINLEVEL: NO PAIN (0)

## 2018-04-23 NOTE — MR AVS SNAPSHOT
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  Ortho/Sports Medicine Clinic  [address] [place] [address]    To schedule future appointments in Ortho Clinic, you may call [phone].    To schedule ordered imaging by your provider:   Call Central Imaging [phone]    To schedule an injection ordered by your provider:  Call Central Imaging Injection scheduling line: [phone]  [employer] available online at:  [email or web address]    Please call if any further questions or concerns ([phone]).  Clinic hours 8 am to 5 pm.    Return to clinic (call) if symptoms worsen or fail to improve.            Follow-ups after your visit        Who to contact     If you have questions or need follow up information about today's clinic visit or your schedule please contact [facility] directly at [phone].  Normal or non-critical lab and imaging results will be communicated to you by Tri-Medicshart, letter or phone within 4 business days after the clinic has received the results. If you do not hear from us within 7 days, please contact the clinic through Tri-Medicshart or phone. If you have a critical or abnormal lab result, we will notify you by phone as soon as possible.  Submit refill requests through [employer] or call your pharmacy and they will forward the refill request to us. Please allow 3 business days for your refill to be completed.          Additional Information About Your Visit        MyCharMetabacus Information     [employer] gives you secure access to your  electronic health record. If you see a primary care provider, you can also send messages to your care team and make appointments. If you have questions, please call your primary care clinic.  If you do not have a primary care provider, please call [phone] and they will assist you.      Dentalink is an electronic gateway that provides easy, online access to your medical records. With Dentalink, you can request a clinic appointment, read your test results, renew a prescription or communicate with your care team.     To access your existing account, please contact your [facility] or call [phone] for assistance.        Care EveryWhere ID     This is your Care EveryWhere ID. This could be used by other organizations to access your [facility] medical records  [id number]        Your Vitals Were     Pulse Pulse Oximetry                75 97%           Blood Pressure from Last 3 Encounters:   04/23/18 116/80   03/19/18 118/80   03/14/18 (!) 149/92    Weight from Last 3 Encounters:   03/19/18 129.7 kg (286 lb)   03/14/18 129.7 kg (286 lb)   02/28/18 129.7 kg (286 lb)              Today, you had the following     No orders found for display       Primary Care Provider Office Phone # Fax #    Kristen M Kehr, PA-C [phone] [address] [facility] [address]        Equal Access to Services     SANDHYA IVEY : Hadii aad ku hadasho Soomaali, waaxda luqadaha, qaybta kaalmada adeegyada, cam petty . So [facility] [phone].    ATENCIÓN: Si habla español, tiene a segovia disposición servicios gratuitos de asistencia lingüística. Lljanice al [phone].    We comply with applicable federal civil rights laws and [place] laws. We do not discriminate on the basis of race, color, national origin, age, disability, sex, sexual orientation, or gender identity.            Thank you!     Thank you for choosing [facility]  for your care. Our  goal is always to provide you with excellent care. Hearing back from our patients is one way we can continue to improve our services. Please take a few minutes to complete the written survey that you may receive in the mail after your visit with us. Thank you!             Your Updated Medication List - Protect others around you: Learn how to safely use, store and throw away your medicines at www.disposemymeds.org.          This list is accurate as of 4/23/18 11:59 PM.  Always use your most recent med list.                   Brand Name Dispense Instructions for use Diagnosis    cyclobenzaprine 5 MG tablet    FLEXERIL    42 tablet    Take 1 tablet (5 mg) by mouth 3 times daily as needed    Lumbar strain, initial encounter       * ibuprofen 200 MG tablet    ADVIL/MOTRIN     Take 200 mg by mouth every 4 hours as needed Reported on 4/17/2017        * ibuprofen 600 MG tablet    ADVIL/MOTRIN    30 tablet    Take 1 tablet (600 mg) by mouth every 6 hours as needed for moderate pain    Lumbar strain, initial encounter       MULTI-VITAMIN PO      Take  by mouth. Takes 1 tab daily        omeprazole 20 MG CR capsule    priLOSEC    90 capsule    Take 1 capsule (20 mg) by mouth daily    Abdominal pain, epigastric       * Notice:  This list has 2 medication(s) that are the same as other medications prescribed for you. Read the directions carefully, and ask your doctor or other care provider to review them with you.

## 2018-04-23 NOTE — NURSING NOTE
"Deepa Anderson's goals for this visit include: Recheck left TFCC tear   She requests these members of her care team be copied on today's visit information: yes    PCP: Kehr, Kristen M    Referring Provider:  No referring provider defined for this encounter.    Chief Complaint   Patient presents with     RECHECK     Left TFCC tear       Initial /80  Pulse 75  SpO2 97% Estimated body mass index is 40.46 kg/(m^2) as calculated from the following:    Height as of 3/19/18: 1.791 m (5' 10.5\").    Weight as of 3/19/18: 129.7 kg (286 lb).  Medication Reconciliation: complete     Hand Evaluation    Pain Score (1-10): No Pain (0)  Hand Dominance:    QuickDASH Disability Score:           Force:   R hand  level 3 force: 36.3 kg (80 lb)  L hand  level 3 force: 31.8 kg (70 lb)        "

## 2018-04-23 NOTE — PROGRESS NOTES
Date of Service: Apr 23, 2018    Chief Complaint:   Chief Complaint   Patient presents with     RECHECK     Left TFCC tear       History of Present Illness: Deepa Anderson is a 47 year old, right handed female who presents today for further evaluation of left TFCC tear. I last saw the patient on 3/19/18 at which time we discussed her history and ongoing symptoms. We also discussed her radiographs and previously obtained MRI, which suggested she had a remote history of a ulnar styloid fracture in addition to a mild tear in the TFCC. At that time, the patient elected to pursue a corticosteroid injection for possible symptomatic management and further diagnostic evaluation. Today, she returns stating her injection six weeks ago has provided her with significant relief, approximately 90% of symptoms. She says she has been using her wrist without pain or difficulty. She has been able to twist her wrist and carry weight without significant problems. She notes the swelling in her wrist has also improved since the injection. She denies any numbness or tingling. She notes she had mild soreness and swelling immediately following the injection, but denies any other pain or signs of infection.    Physical examination:  VITALS: /80  Pulse 75  SpO2 97%  Pain is rated 0 out of 10 on the visual analog scale.  QUICKDASH: 2.27   Strength: The patient's  strength at 3 levels is 80 pounds on the right, versus 70 pounds on the left.   GENERAL: Healthy-appearing adult female in no acute distress.  Alert and oriented times three.  Respiratory: Breathing regular and non-labored.  Left upper extremity: Full shoulder, elbow, forearm, and wrist range of motion. No obvious deformities.  Tenderness over the ulnar styloid. No tenderness of the ECU. Mild translation of the ulna relative to the radius with pronation of her extremity. 5/5 finger abduction, EPL, and FPL are intact. Patient's neurovascular exam is intact and all  digits are warm and well perfused with capillary refill in less than 2 seconds.  Skin: Intact without any rashes or abrasions.    IMAGING STUDIES: An MRI of the left wrist was available for review, dated 2/21/18.  This showed full thickness tear of the triangular fibrocartilage, old ununited ulnar styloid fracture, mild tenosynovitis of the extensor tendons dorsal and radial to the distal radius.    Assessment: 47 year old, right handed female with left wrist degenerative TFCC tear and extensor tendonitis.    Plan: Mrs. Anderson and I discussed her relief of symptoms following her corticosteroid injection. I explained I am happy with her results for both her symptomatic relief and diagnostic confirmation. We discussed the necessity of further treatment and I believe she can wait to proceed with any further treatment as long as she continues to have relief from the injection. We did re-discuss the possibility of proceeding with surgical intervention in the future or the possibility using a second injection for relief if her symptoms return. I encouraged her to monitor her symptoms and contact me if anything changes or worsens. We will leave follow-up open ended. She is agreeable to the plan and all her questions were answered at this time.    I, Reina Hathaway MD, have reviewed the above note and agree with the scribe's notation as written.   I, Sanjay Patel, am serving as a scribe to document services personally performed by Reina Hathaway MD, based upon my observations and the provider's statements to me. All documentation has been reviewed by the aforementioned doctor prior to being entered into the official medical record.

## 2018-04-23 NOTE — PATIENT INSTRUCTIONS
Thanks for coming today.  [facility]  [address] [place] [address]    To schedule future appointments in Ortho Clinic, you may call [phone].    To schedule ordered imaging by your provider:   Call [facility] [phone]    To schedule an injection ordered by your provider:  Call [facility] Injection scheduling line: [phone]  [facility] available online at:  [email or web address]    Please call if any further questions or concerns ([phone]).  Clinic hours 8 am to 5 pm.    Return to clinic (call) if symptoms worsen or fail to improve.

## 2018-10-04 ENCOUNTER — MYC MEDICAL ADVICE (OUTPATIENT)
Dept: FAMILY MEDICINE | Facility: CLINIC | Age: 48
End: 2018-10-04

## 2018-10-04 DIAGNOSIS — M24.132 DEGENERATIVE TEAR OF TRIANGULAR FIBROCARTILAGE COMPLEX (TFCC) OF LEFT WRIST: Primary | ICD-10-CM

## 2018-11-06 ENCOUNTER — TRANSFERRED RECORDS (OUTPATIENT)
Dept: HEALTH INFORMATION MANAGEMENT | Facility: CLINIC | Age: 48
End: 2018-11-06

## 2018-11-19 ENCOUNTER — HEALTH MAINTENANCE LETTER (OUTPATIENT)
Age: 48
End: 2018-11-19

## 2018-12-05 NOTE — PROGRESS NOTES
[facility]  [address] [facility] [address] [phone]  Dept: [phone]    PRE-OP EVALUATION:  Today's date: 2018    Deepa Anderson (: 1970) presents for pre-operative evaluation assessment as requested by Mina Carmona.  She requires evaluation and anesthesia risk assessment prior to undergoing surgery/procedure for treatment of left hand .    Fax number for surgical facility: [phone]  Primary Physician: Kehr, Kristen M  Type of Anesthesia Anticipated: to be determined    Patient has a Health Care Directive or Living Will:  NO    Preop Questions 12/3/2018   Who is doing your surgery? Dr. Mina Cohen   What are you having done? TFCC repair and debridement   Date of Surgery/Procedure: 18   Facility or Hospital where procedure/surgery will be performed: [facility]   1.  Do you have a history of Heart attack, stroke, stent, coronary bypass surgery, or other heart surgery? No   2.  Do you ever have any pain or discomfort in your chest? No   3.  Do you have a history of  Heart Failure? No   4.   Are you troubled by shortness of breath when:  walking on a level surface, or up a slight hill, or at night? No   5.  Do you currently have a cold, bronchitis or other respiratory infection? No   6.  Do you have a cough, shortness of breath, or wheezing? No   7.  Do you sometimes get pains in the calves of your legs when you walk? No   8. Do you or anyone in your family have previous history of blood clots? YES - brother   9.  Do you or does anyone in your family have a serious bleeding problem such as prolonged bleeding following surgeries or cuts? No   10. Have you ever had problems with anemia or been told to take iron pills? No   11. Have you had any abnormal blood loss such as black, tarry or bloody stools, or abnormal vaginal bleeding? No   12. Have you ever had a blood transfusion? No   13. Have you or any of your relatives ever had  problems with anesthesia? No   14. Do you have sleep apnea, excessive snoring or daytime drowsiness? No   15. Do you have any prosthetic heart valves? No   16. Do you have prosthetic joints? No   17. Is there any chance that you may be pregnant? No         HPI:     HPI related to upcoming procedure: Deepa has a tear in the cartilage in her left wrist and will be having surgery        MEDICAL HISTORY:     Patient Active Problem List    Diagnosis Date Noted     Tear of triangular fibrocartilage of left wrist 2018     Priority: Medium     Tobacco use disorder 2016     Priority: Medium     S/P laparoscopic cholecystectomy 2016     Priority: Medium     Gallstones 2016     Priority: Medium     Abdominal pain, epigastric 2016     Priority: Medium     Tubular adenoma of colon 10/12/2015     Priority: Medium     Colonoscopy , repeat in 3 years.        Diarrhea 2015     Priority: Medium     H. pylori infection 2014     Priority: Medium     CARDIOVASCULAR SCREENING; LDL GOAL LESS THAN 160 2013     Priority: Medium     Obesity 10/25/2012     Priority: Medium     History of dysplastic nevus 2012     Priority: Medium     Acne vulgaris 2012     Priority: Medium      Past Medical History:   Diagnosis Date     Acne      Actinic keratosis 2010    porokeratosis R hand     History of atypical/dysplastic nevus     back and R thigh.     Obesity      Past Surgical History:   Procedure Laterality Date     ABDOMEN SURGERY   &      births     BIOPSY  2015    polyp removed     C  DELIVERY ONLY      x2/'02,'06     CHOLECYSTECTOMY  06/15/2016     COLONOSCOPY  2015     COMBINED ESOPHAGOSCOPY, GASTROSCOPY, DUODENOSCOPY (EGD) WITH CO2 INSUFFLATION N/A 2017    Procedure: COMBINED ESOPHAGOSCOPY, GASTROSCOPY, DUODENOSCOPY (EGD) WITH CO2 INSUFFLATION;  EGD-MASS OF NECK/ DELL;  Surgeon: Sohan Renee MD;  Location: [facility] OR     CYSTECTOMY  PILONIDAL  '05     EXCISE MASS NECK Left 6/29/2017    Procedure: EXCISE MASS NECK;;  Surgeon: Rayray Morton MD;  Location: [facility] OR     HEAD & NECK SURGERY  June 2017    brachial cleft cyst     LARYNGOSCOPY WITH BIOPSY(IES) N/A 6/29/2017    Procedure: LARYNGOSCOPY WITH BIOPSY(IES);  Direct laryngoscopy with biopsy and excision of left neck mass;  Surgeon: Rayray Morton MD;  Location: [facility] OR     SOFT TISSUE SURGERY  2005    Pilonidal cyst surgery     TUBAL LIGATION  '06     Current Outpatient Prescriptions   Medication Sig Dispense Refill     citalopram (CELEXA) 20 MG tablet   3     omeprazole (PRILOSEC) 20 MG CR capsule Take 1 capsule (20 mg) by mouth daily 90 capsule 3     OTC products: no recent use of OTC ASA, NSAIDS or Steroids    Allergies   Allergen Reactions     Percodan [Aspirin]       Latex Allergy: NO    Social History   Substance Use Topics     Smoking status: Light Tobacco Smoker     Packs/day: 0.50     Years: 25.00     Types: Cigarettes     Smokeless tobacco: Never Used     Alcohol use Yes      Comment: 3-4 drinks per week     History   Drug Use No       REVIEW OF SYSTEMS:   Constitutional, neuro, ENT, endocrine, pulmonary, cardiac, gastrointestinal, genitourinary, musculoskeletal, integument and psychiatric systems are negative, except as otherwise noted.    EXAM:   /76  Pulse 82  Temp 97.8  F (36.6  C) (Oral)  Resp 16  Wt 290 lb (131.5 kg)  SpO2 97%  BMI 41.02 kg/m2    GENERAL APPEARANCE: healthy, alert and no distress     EYES: EOMI, PERRL     HENT: ear canals and TM's normal and nose and mouth without ulcers or lesions     NECK: no adenopathy, no asymmetry, masses, or scars and thyroid normal to palpation     RESP: lungs clear to auscultation - no rales, rhonchi or wheezes     CV: regular rates and rhythm, normal S1 S2, no S3 or S4 and no murmur, click or rub     ABDOMEN:  soft, nontender, no HSM or masses and bowel sounds normal     MS: extremities normal- no gross deformities  noted, no evidence of inflammation in joints, FROM in all extremities.     SKIN: no suspicious lesions or rashes     NEURO: Normal strength and tone, sensory exam grossly normal, mentation intact and speech normal     PSYCH: mentation appears normal. and affect normal/bright     LYMPHATICS: No cervical adenopathy    DIAGNOSTICS:     Results for orders placed or performed in visit on 12/06/18   Hemoglobin   Result Value Ref Range    Hemoglobin 13.9 11.7 - 15.7 g/dL         Recent Labs   Lab Test  06/29/16   0904  05/26/15   0930   HGB  14.1  13.5   PLT  234  184   NA  137  142   POTASSIUM  4.4  3.8   CR  0.69  0.78        IMPRESSION:   Reason for surgery/procedure: fibrocartilage tear in the left wrist  Diagnosis/reason for consult: preoperative clearance    The proposed surgical procedure is considered LOW risk.    REVISED CARDIAC RISK INDEX  The patient has the following serious cardiovascular risks for perioperative complications such as (MI, PE, VFib and 3  AV Block):  No serious cardiac risks  INTERPRETATION: 0 risks: Class I (very low risk - 0.4% complication rate)    The patient has the following additional risks for perioperative complications:  No identified additional risks      ICD-10-CM    1. Preop general physical exam Z01.818 Hemoglobin   2. Tear of triangular fibrocartilage of left wrist S63.592A        RECOMMENDATIONS:       --Patient is to take all scheduled medications on the day of surgery.    APPROVAL GIVEN to proceed with proposed procedure, without further diagnostic evaluation       Signed Electronically by: Kristen M. Kehr, PA-C  Chart reviewed, agree with evaluation and recommendations above.  Jennifer Phelan M.D.       Copy of this evaluation report is provided to requesting physician.    [facility] Preop Guidelines    Revised Cardiac Risk Index

## 2018-12-06 ENCOUNTER — OFFICE VISIT (OUTPATIENT)
Dept: FAMILY MEDICINE | Facility: CLINIC | Age: 48
End: 2018-12-06
Payer: COMMERCIAL

## 2018-12-06 VITALS
DIASTOLIC BLOOD PRESSURE: 76 MMHG | BODY MASS INDEX: 41.02 KG/M2 | SYSTOLIC BLOOD PRESSURE: 122 MMHG | HEART RATE: 82 BPM | WEIGHT: 290 LBS | OXYGEN SATURATION: 97 % | TEMPERATURE: 97.8 F | RESPIRATION RATE: 16 BRPM

## 2018-12-06 DIAGNOSIS — Z01.818 PREOP GENERAL PHYSICAL EXAM: Primary | ICD-10-CM

## 2018-12-06 DIAGNOSIS — S63.592A: ICD-10-CM

## 2018-12-06 LAB — HGB BLD-MCNC: 13.9 G/DL (ref 11.7–15.7)

## 2018-12-06 PROCEDURE — 36415 COLL VENOUS BLD VENIPUNCTURE: CPT | Performed by: PHYSICIAN ASSISTANT

## 2018-12-06 PROCEDURE — 85018 HEMOGLOBIN: CPT | Performed by: PHYSICIAN ASSISTANT

## 2018-12-06 PROCEDURE — 99214 OFFICE O/P EST MOD 30 MIN: CPT | Performed by: PHYSICIAN ASSISTANT

## 2018-12-06 RX ORDER — CITALOPRAM HYDROBROMIDE 20 MG/1
TABLET ORAL
Refills: 3 | COMMUNITY
Start: 2018-11-27 | End: 2019-04-09

## 2018-12-06 ASSESSMENT — PAIN SCALES - GENERAL: PAINLEVEL: NO PAIN (0)

## 2018-12-06 NOTE — NURSING NOTE
"Chief Complaint   Patient presents with     Pre-Op Exam       Initial /76  Pulse 82  Temp 97.8  F (36.6  C) (Oral)  Resp 16  Wt 290 lb (131.5 kg)  SpO2 97%  BMI 41.02 kg/m2 Estimated body mass index is 41.02 kg/(m^2) as calculated from the following:    Height as of 3/19/18: 5' 10.5\" (1.791 m).    Weight as of this encounter: 290 lb (131.5 kg).  Medication Reconciliation: complete    ASAD Cotto MA    "

## 2018-12-06 NOTE — MR AVS SNAPSHOT
After Visit Summary   12/6/2018    Deepa Anderson    MRN: [id number]           Patient Information     Date Of Birth          1970        Visit Information        Provider Department      12/6/2018 7:40 AM Kehr, Kristen M, PA-C [facility]        Today's Diagnoses     Preop general physical exam    -  1    Tear of triangular fibrocartilage of left wrist          Care Instructions      Before Your Surgery      Call your surgeon if there is any change in your health. This includes signs of a cold or flu (such as a sore throat, runny nose, cough, rash or fever).    Do not smoke, drink alcohol or take over the counter medicine (unless your surgeon or primary care doctor tells you to) for the 24 hours before and after surgery.    If you take prescribed drugs: Follow your doctor s orders about which medicines to take and which to stop until after surgery.    Eating and drinking prior to surgery: follow the instructions from your surgeon    Take a shower or bath the night before surgery. Use the soap your surgeon gave you to gently clean your skin. If you do not have soap from your surgeon, use your regular soap. Do not shave or scrub the surgery site.  Wear clean pajamas and have clean sheets on your bed.           Follow-ups after your visit        Follow-up notes from your care team     Return in about 3 months (around 3/6/2019) for Physical Exam, medication check, with primary provider.      Who to contact     If you have questions or need follow up information about today's clinic visit or your schedule please contact [facility] directly at [phone].  Normal or non-critical lab and imaging results will be communicated to you by MyChart, letter or phone within 4 business days after the clinic has received the results. If you do not hear from us within 7 days, please contact the clinic through MyChart or phone. If you have a critical or abnormal lab result, we will  notify you by phone as soon as possible.  Submit refill requests through [employer] or call your pharmacy and they will forward the refill request to us. Please allow 3 business days for your refill to be completed.          Additional Information About Your Visit        [employer] Information     [employer] gives you secure access to your electronic health record. If you see a primary care provider, you can also send messages to your care team and make appointments. If you have questions, please call your primary care clinic.  If you do not have a primary care provider, please call [phone] and they will assist you.        Care EveryWhere ID     This is your Care EveryWhere ID. This could be used by other organizations to access your [place] medical records  [id number]        Your Vitals Were     Pulse Temperature Respirations Pulse Oximetry BMI (Body Mass Index)       82 97.8  F (36.6  C) (Oral) 16 97% 41.02 kg/m2        Blood Pressure from Last 3 Encounters:   12/06/18 122/76   04/23/18 116/80   03/19/18 118/80    Weight from Last 3 Encounters:   12/06/18 290 lb (131.5 kg)   03/19/18 286 lb (129.7 kg)   03/14/18 286 lb (129.7 kg)              We Performed the Following     Hemoglobin        Primary Care Provider Office Phone # Fax #    Kristen M Kehr, PA-C [phone] [address] [facility] [address]        Equal Access to Services     [facility] : Hadii aad ku hadasho Soomaali, waaxda luqadaha, qaybta kaalmada adeegyada, cam petty . So [facility] [phone].    ATENCIÓN: Si habla español, tiene a segovia disposición servicios gratuitos de asistencia lingüística. Jocelin al [phone].    We comply with applicable federal civil rights laws and [place] laws. We do not discriminate on the basis of race, color, national origin, age, disability, sex, sexual orientation, or gender identity.            Thank you!     Thank you for choosing [facility] [place] [place]  your care. Our goal is always to provide you with excellent care. Hearing back from our patients is one way we can continue to improve our services. Please take a few minutes to complete the written survey that you may receive in the mail after your visit with us. Thank you!             Your Updated Medication List - Protect others around you: Learn how to safely use, store and throw away your medicines at www.disposemymeds.org.          This list is accurate as of 12/6/18 11:59 PM.  Always use your most recent med list.                   Brand Name Dispense Instructions for use Diagnosis    citalopram 20 MG tablet    celeXA          omeprazole 20 MG DR capsule    priLOSEC    90 capsule    Take 1 capsule (20 mg) by mouth daily    Abdominal pain, epigastric

## 2018-12-28 ENCOUNTER — TRANSFERRED RECORDS (OUTPATIENT)
Dept: HEALTH INFORMATION MANAGEMENT | Facility: CLINIC | Age: 48
End: 2018-12-28

## 2019-01-25 ENCOUNTER — TRANSFERRED RECORDS (OUTPATIENT)
Dept: HEALTH INFORMATION MANAGEMENT | Facility: CLINIC | Age: 49
End: 2019-01-25

## 2019-03-05 ENCOUNTER — TRANSFERRED RECORDS (OUTPATIENT)
Dept: HEALTH INFORMATION MANAGEMENT | Facility: CLINIC | Age: 49
End: 2019-03-05

## 2019-04-03 ENCOUNTER — OFFICE VISIT (OUTPATIENT)
Dept: PODIATRY | Facility: CLINIC | Age: 49
End: 2019-04-03
Payer: COMMERCIAL

## 2019-04-03 VITALS
WEIGHT: 293 LBS | HEART RATE: 68 BPM | SYSTOLIC BLOOD PRESSURE: 134 MMHG | DIASTOLIC BLOOD PRESSURE: 80 MMHG | BODY MASS INDEX: 41.02 KG/M2 | HEIGHT: 71 IN

## 2019-04-03 DIAGNOSIS — B35.1 ONYCHOMYCOSIS: Primary | ICD-10-CM

## 2019-04-03 DIAGNOSIS — M79.674 PAIN IN RIGHT TOE(S): ICD-10-CM

## 2019-04-03 LAB
ALBUMIN SERPL-MCNC: 3.7 G/DL (ref 3.4–5)
ALP SERPL-CCNC: 72 U/L (ref 40–150)
ALT SERPL W P-5'-P-CCNC: 18 U/L (ref 0–50)
AST SERPL W P-5'-P-CCNC: 19 U/L (ref 0–45)
BILIRUB DIRECT SERPL-MCNC: <0.1 MG/DL (ref 0–0.2)
BILIRUB SERPL-MCNC: 0.4 MG/DL (ref 0.2–1.3)
PROT SERPL-MCNC: 7.2 G/DL (ref 6.8–8.8)

## 2019-04-03 PROCEDURE — 80076 HEPATIC FUNCTION PANEL: CPT | Performed by: PODIATRIST

## 2019-04-03 PROCEDURE — 11720 DEBRIDE NAIL 1-5: CPT | Performed by: PODIATRIST

## 2019-04-03 PROCEDURE — 99203 OFFICE O/P NEW LOW 30 MIN: CPT | Mod: 25 | Performed by: PODIATRIST

## 2019-04-03 PROCEDURE — 36415 COLL VENOUS BLD VENIPUNCTURE: CPT | Performed by: PODIATRIST

## 2019-04-03 RX ORDER — TERBINAFINE HYDROCHLORIDE 250 MG/1
250 TABLET ORAL DAILY
Qty: 30 TABLET | Refills: 2 | Status: SHIPPED | OUTPATIENT
Start: 2019-04-03 | End: 2019-05-06

## 2019-04-03 ASSESSMENT — MIFFLIN-ST. JEOR: SCORE: 2061.77

## 2019-04-03 NOTE — PROGRESS NOTES
Patient complains of thick right and second first toenails(s) .  Has had this for many years.  It is slowly getting worse.  Has had pain in the past which is aggravated by activity and relieved by rest, especially second toe.  Tried over the counter medications without success.  Does not like the look of the nail and is wondering about options.  Patient denies heavy OH use.  Denies high cholesterol or being on any statin medication.  Patient does have other family member with this problem.  Mother took Lamisil and had allergic reaction.  Smokes 1/2 pack per day.          ROS:  A 10-point review of systems was performed and is positive for that noted in the HPI and as seen below.  All other areas are negative.          Allergies   Allergen Reactions     Percodan [Aspirin]        Current Outpatient Medications   Medication Sig Dispense Refill     citalopram (CELEXA) 20 MG tablet   3     omeprazole (PRILOSEC) 20 MG CR capsule Take 1 capsule (20 mg) by mouth daily 90 capsule 3     terbinafine (LAMISIL) 250 MG tablet Take 1 tablet (250 mg) by mouth daily 30 tablet 2       Patient Active Problem List   Diagnosis     History of dysplastic nevus     Acne vulgaris     Obesity     CARDIOVASCULAR SCREENING; LDL GOAL LESS THAN 160     H. pylori infection     Diarrhea     Tubular adenoma of colon     Abdominal pain, epigastric     Gallstones     S/P laparoscopic cholecystectomy     Tobacco use disorder     Tear of triangular fibrocartilage of left wrist       Past Medical History:   Diagnosis Date     Acne      Actinic keratosis 2010    porokeratosis R hand     History of atypical/dysplastic nevus     back and R thigh.     Obesity        Past Surgical History:   Procedure Laterality Date     ABDOMEN SURGERY   &      births     BIOPSY  2015    polyp removed     C  DELIVERY ONLY      x2/'02,'06     CHOLECYSTECTOMY  06/15/2016     COLONOSCOPY  2015     COMBINED ESOPHAGOSCOPY, GASTROSCOPY,  "DUODENOSCOPY (EGD) WITH CO2 INSUFFLATION N/A 5/16/2017    Procedure: COMBINED ESOPHAGOSCOPY, GASTROSCOPY, DUODENOSCOPY (EGD) WITH CO2 INSUFFLATION;  EGD-MASS OF NECK/ DELL;  Surgeon: Sohan Renee MD;  Location: [facility] OR     CYSTECTOMY PILONIDAL  '05     EXCISE MASS NECK Left 6/29/2017    Procedure: EXCISE MASS NECK;;  Surgeon: Rayray Morton MD;  Location: [facility]     HEAD & NECK SURGERY  June 2017    brachial cleft cyst     LARYNGOSCOPY WITH BIOPSY(IES) N/A 6/29/2017    Procedure: LARYNGOSCOPY WITH BIOPSY(IES);  Direct laryngoscopy with biopsy and excision of left neck mass;  Surgeon: Rayray Morton MD;  Location: [facility] OR     SOFT TISSUE SURGERY  2005    Pilonidal cyst surgery     TUBAL LIGATION  '06       Family History   Problem Relation Age of Onset     Arthritis Mother      Lipids Mother      Hypertension Mother      Obesity Mother      Thyroid Disease Mother      Cardiovascular Father      Hypertension Father      Cerebrovascular Disease Father      Hypertension Brother      Hypertension Brother      Cancer No family hx of      Glaucoma No family hx of      Macular Degeneration No family hx of      Diabetes No family hx of        Social History     Tobacco Use     Smoking status: Light Tobacco Smoker     Packs/day: 0.50     Years: 25.00     Pack years: 12.50     Types: Cigarettes     Smokeless tobacco: Never Used   Substance Use Topics     Alcohol use: Yes     Comment: 3-4 drinks per week         /80 (BP Location: Right arm)   Pulse 68   Ht 1.791 m (5' 10.5\")   Wt 133.4 kg (294 lb)   BMI 41.59 kg/m  .      VConstitutional/ general:  Pt is in no apparent distress, appears well-nourished.  Cooperative with history and physical exam.     Psych:  The patient answered questions appropriately.  Normal affect.  Seems to have reasonable expectations, in terms of treatment.    Eyes:  Visual scanning/ tracking without deficit.    Ears:  Response to auditory stimuli is normal.  negative hearing aid " devices.  Auricles in proper alignment.     Lymphatic:  Popliteal lymph nodes not enlarged.     Lungs:  Non labored breathing, non labored speech. No cough.  No audible wheezing. Even, quiet breathing.       Vascular:  Pedal pulses are palpable bilaterally for both the DP and PT arteries.  CFT < 3 sec.  No edema.  Pedal hair growth noted.     Neuro:  Alert and oriented x 3. Coordinated gait.  Light touch sensation is intact to the L4, L5, S1 distributions. No obvious deficits.  No evidence of neurological-based weakness, spasticity, or contracture in the lower extremities.  Monofilament in tact on all digits.    Derm: Normal texture and turgor.  No erythema, ecchymosis, or cyanosis.  No open lesions. right first and second toenails(s)  thickened, elongated, discolored, with subungual debris.  Second very painful on palpation.  No erythema, edema, drainage, pain on palpation.  No signs of subungual masses or exostosis.    Musculoskeletal:    Lower extremity muscle strength is normal.  Patient is ambulatory without an assistive device or brace.  No gross deformities.  MS 5/5 all compartments.  Normal arch with weightbearing.         A/P  Onychomycosis          Pain     Mycotic nail manually debrided with a .  She will keep this filed down.    Discussed all options with patient regarding fungal infection.  Would like to try lamisil.  Risks, complications, and efficacy of going on this pill were discussed with the patient.  Will start lamisil 250mg, dispense 30, one per oral every day.  Two refils.  Will get LFTs today for baseline.  RTC in 3 months.    Tiburcio Pina DPM, FACFAS

## 2019-04-03 NOTE — LETTER
4/3/2019         RE: Deepa Anderson  [address] [facility] [address]        Dear Colleague,    Thank you for referring your patient, Deepa Anderson, to the [facility]. Please see a copy of my visit note below.    Patient complains of thick right and second first toenails(s) .  Has had this for many years.  It is slowly getting worse.  Has had pain in the past which is aggravated by activity and relieved by rest, especially second toe.  Tried over the counter medications without success.  Does not like the look of the nail and is wondering about options.  Patient denies heavy OH use.  Denies high cholesterol or being on any statin medication.  Patient does have other family member with this problem.  Mother took Lamisil and had allergic reaction.  Smokes 1/2 pack per day.          ROS:  A 10-point review of systems was performed and is positive for that noted in the HPI and as seen below.  All other areas are negative.          Allergies   Allergen Reactions     Percodan [Aspirin]        Current Outpatient Medications   Medication Sig Dispense Refill     citalopram (CELEXA) 20 MG tablet   3     omeprazole (PRILOSEC) 20 MG CR capsule Take 1 capsule (20 mg) by mouth daily 90 capsule 3     terbinafine (LAMISIL) 250 MG tablet Take 1 tablet (250 mg) by mouth daily 30 tablet 2       Patient Active Problem List   Diagnosis     History of dysplastic nevus     Acne vulgaris     Obesity     CARDIOVASCULAR SCREENING; LDL GOAL LESS THAN 160     H. pylori infection     Diarrhea     Tubular adenoma of colon     Abdominal pain, epigastric     Gallstones     S/P laparoscopic cholecystectomy     Tobacco use disorder     Tear of triangular fibrocartilage of left wrist       Past Medical History:   Diagnosis Date     Acne      Actinic keratosis 08/2010    porokeratosis R hand     History of atypical/dysplastic nevus     back and R thigh.     Obesity        Past Surgical History:   Procedure Laterality Date  "    ABDOMEN SURGERY   &      births     BIOPSY  2015    polyp removed     C  DELIVERY ONLY      x2/'02,'06     CHOLECYSTECTOMY  06/15/2016     COLONOSCOPY  2015     COMBINED ESOPHAGOSCOPY, GASTROSCOPY, DUODENOSCOPY (EGD) WITH CO2 INSUFFLATION N/A 2017    Procedure: COMBINED ESOPHAGOSCOPY, GASTROSCOPY, DUODENOSCOPY (EGD) WITH CO2 INSUFFLATION;  EGD-MASS OF NECK/ DELL;  Surgeon: Sohan Renee MD;  Location: [facility]     CYSTECTOMY PILONIDAL  '     EXCISE MASS NECK Left 2017    Procedure: EXCISE MASS NECK;;  Surgeon: Rayray Morton MD;  Location: [facility]     HEAD & NECK SURGERY  2017    brachial cleft cyst     LARYNGOSCOPY WITH BIOPSY(IES) N/A 2017    Procedure: LARYNGOSCOPY WITH BIOPSY(IES);  Direct laryngoscopy with biopsy and excision of left neck mass;  Surgeon: Rayray Morton MD;  Location: [facility]     SOFT TISSUE SURGERY      Pilonidal cyst surgery     TUBAL LIGATION  06       Family History   Problem Relation Age of Onset     Arthritis Mother      Lipids Mother      Hypertension Mother      Obesity Mother      Thyroid Disease Mother      Cardiovascular Father      Hypertension Father      Cerebrovascular Disease Father      Hypertension Brother      Hypertension Brother      Cancer No family hx of      Glaucoma No family hx of      Macular Degeneration No family hx of      Diabetes No family hx of        Social History     Tobacco Use     Smoking status: Light Tobacco Smoker     Packs/day: 0.50     Years: 25.00     Pack years: 12.50     Types: Cigarettes     Smokeless tobacco: Never Used   Substance Use Topics     Alcohol use: Yes     Comment: 3-4 drinks per week         /80 (BP Location: Right arm)   Pulse 68   Ht 1.791 m (5' 10.5\")   Wt 133.4 kg (294 lb)   BMI 41.59 kg/m   .      VConstitutional/ general:  Pt is in no apparent distress, appears well-nourished.  Cooperative with history and physical exam.     Psych:  The " patient answered questions appropriately.  Normal affect.  Seems to have reasonable expectations, in terms of treatment.    Eyes:  Visual scanning/ tracking without deficit.    Ears:  Response to auditory stimuli is normal.  negative hearing aid devices.  Auricles in proper alignment.     Lymphatic:  Popliteal lymph nodes not enlarged.     Lungs:  Non labored breathing, non labored speech. No cough.  No audible wheezing. Even, quiet breathing.       Vascular:  Pedal pulses are palpable bilaterally for both the DP and PT arteries.  CFT < 3 sec.  No edema.  Pedal hair growth noted.     Neuro:  Alert and oriented x 3. Coordinated gait.  Light touch sensation is intact to the L4, L5, S1 distributions. No obvious deficits.  No evidence of neurological-based weakness, spasticity, or contracture in the lower extremities.  Monofilament in tact on all digits.    Derm: Normal texture and turgor.  No erythema, ecchymosis, or cyanosis.  No open lesions. right first and second toenails(s)  thickened, elongated, discolored, with subungual debris.  Second very painful on palpation.  No erythema, edema, drainage, pain on palpation.  No signs of subungual masses or exostosis.    Musculoskeletal:    Lower extremity muscle strength is normal.  Patient is ambulatory without an assistive device or brace.  No gross deformities.  MS 5/5 all compartments.  Normal arch with weightbearing.         A/P  Onychomycosis          Pain     Mycotic nail manually debrided with a .  She will keep this filed down.    Discussed all options with patient regarding fungal infection.  Would like to try lamisil.  Risks, complications, and efficacy of going on this pill were discussed with the patient.  Will start lamisil 250mg, dispense 30, one per oral every day.  Two refils.  Will get LFTs today for baseline.  RTC in 3 months.    Tiburcio Pina DPM, FACFAS                  Again, thank you for allowing me to participate in the care of your  patient.        Sincerely,        Tiburcio Pina DPM

## 2019-04-03 NOTE — PATIENT INSTRUCTIONS
We wish you continued good healing. If you have any questions or concerns, please do not hesitate to contact us at [phone]    Please remember to call and schedule a follow up appointment if one was recommended at your earliest convenience.   PODIATRY CLINIC HOURS  TELEPHONE NUMBER    Dr. Tiburcio Pina D.P.M [facility]    Clinics:  [facility]    [facility]   Tuesday 1PM-6PM  [place]  Wednesday 7AM-2PM  [place]  Thursday 10AM-6PM  [place]  Friday 7AM-3PM  [place]  Specialty schedulers:   [phone] to make an appointment with any Specialty Provider.        Urgent Care locations:    [facility] Monday-Friday 5 pm - 9 pm. Saturday-Sunday 9 am -5pm    Monday-Friday 11 am - 9 pm Saturday 9 am - 5 pm     Monday-Sunday 12 noon-8PM [phone]     If you need a medication refill, please contact us you may need lab work and/or a follow up visit prior to your refill (i.e. Antifungal medications).    [facility] (secure e-mail communication and access to your chart) to send a message or to make an appointment.    If MRI needed please call Brennan Garcia at [phone]        Weight management plan: Patient was referred to their PCP to discuss a diet and exercise plan.   Deepa to follow up with Primary Care provider regarding elevated blood pressure.    SMOKING CESSATION  What's in cigarette smoke? - Cigarette smoke contains over 4,000 chemicals. Nicotine is one of the main ingredients which is an insecticide/herbicide. It is poisonous to our nervous system, increases blood clotting risk, and decreases the body's defenses to fight off infection. Another chemical is Carbon Monoxide is an asphyxiating gas that permanently binds to blood cells and blocks the transport of oxygen. This leads to tissue death and decreases your metabolism. Tar is a chemical that coats your  lungs and trachea which impairs new oxygen coming in and carbon dioxide getting out of your body.   How does smoking impact surgery? - Smoking is particularly hazardous with regards to surgery. Surgery puts stress on the body and a smoker's body is already under strain from these chemicals. Putting the two together, especially for an elective surgery, could be a recipe for disaster. Smoking before and after surgery increases your risk of heart problems, slow wound healing, delayed bone healing, blood clots, wound infection and anesthesia complications.   What are the benefits of quitting? - In 20 minutes your blood pressure will drop back down to normal. In 8 hours the carbon monoxide (a toxic gas) levels in your blood stream will drop by half, and oxygen levels will return to normal. In 48 hours your chance of having a heart attack will have decreased. All nicotine will have left your body. Your sense of taste and smell will return to a normal level. In 72 hours your bronchial tubes will relax, and your energy levels will increase. In 2 weeks your circulation will increase, and it will continue to improve for the next 10 weeks.    Recommendations for elective surgery - Ideally, patients should quit smoking 8 weeks before and at least 2 weeks after elective surgery in order to avoid complications. Simply cutting back on the amount of cigarettes smoked per day does not offer any benefit or decrease the risk of poor wound healing, heart problems, and infection. Smokers should also start taking Vitamin C and B for two weeks before surgery and two weeks after surgery.    Ways to Stop Smokin. Nicotine patches, lozenges, or gum  2. Support Groups  3. Medications (see below)    List of Medications:  1. Varenicline Tartrate (CHANTIX)   2. Bupropion HCL (WELLBUTRIN, ZYBAN) - note: make sure Wellbutrin is for smoking cessation and not other issues   3. Nicotine Patch (NICODERM)   4. Nicotine Inhaler (NICOTROL)   5.  Nicotine Gum Nicotine Polacrilex   6. Nicotine Lozenge: Nicotine Polacrilex (COMMIT)   * [employer] offers a smoking support group as well!  Please visit: [email or web address]  If you are interested in these, ask about getting a prescription or talk to your primary care doctor about what may be the best way for you to quit.

## 2019-04-08 ASSESSMENT — ENCOUNTER SYMPTOMS
COUGH: 0
PARESTHESIAS: 0
SORE THROAT: 0
DIZZINESS: 0
MYALGIAS: 0
HEMATURIA: 0
FEVER: 0
DYSURIA: 0
NAUSEA: 0
EYE PAIN: 0
DIARRHEA: 0
ARTHRALGIAS: 0
HEARTBURN: 0
JOINT SWELLING: 0
ABDOMINAL PAIN: 0
FREQUENCY: 0
CHILLS: 0
NERVOUS/ANXIOUS: 0
SHORTNESS OF BREATH: 0
CONSTIPATION: 0
HEADACHES: 0
WEAKNESS: 0
PALPITATIONS: 1
HEMATOCHEZIA: 0
BREAST MASS: 0

## 2019-04-09 ENCOUNTER — OFFICE VISIT (OUTPATIENT)
Dept: FAMILY MEDICINE | Facility: CLINIC | Age: 49
End: 2019-04-09
Payer: COMMERCIAL

## 2019-04-09 VITALS
DIASTOLIC BLOOD PRESSURE: 86 MMHG | WEIGHT: 293 LBS | BODY MASS INDEX: 42.15 KG/M2 | OXYGEN SATURATION: 93 % | SYSTOLIC BLOOD PRESSURE: 131 MMHG | HEART RATE: 72 BPM | TEMPERATURE: 98.2 F

## 2019-04-09 DIAGNOSIS — Z00.00 ROUTINE GENERAL MEDICAL EXAMINATION AT A HEALTH CARE FACILITY: Primary | ICD-10-CM

## 2019-04-09 DIAGNOSIS — Z12.11 SCREEN FOR COLON CANCER: ICD-10-CM

## 2019-04-09 DIAGNOSIS — F17.200 TOBACCO USE DISORDER: ICD-10-CM

## 2019-04-09 DIAGNOSIS — K21.9 GERD WITHOUT ESOPHAGITIS: ICD-10-CM

## 2019-04-09 DIAGNOSIS — D12.6 TUBULAR ADENOMA OF COLON: ICD-10-CM

## 2019-04-09 DIAGNOSIS — N95.1 PERIMENOPAUSE: ICD-10-CM

## 2019-04-09 PROCEDURE — 99396 PREV VISIT EST AGE 40-64: CPT | Performed by: PHYSICIAN ASSISTANT

## 2019-04-09 RX ORDER — CITALOPRAM HYDROBROMIDE 20 MG/1
20 TABLET ORAL DAILY
Qty: 90 TABLET | Refills: 3 | Status: SHIPPED | OUTPATIENT
Start: 2019-04-09 | End: 2020-06-09

## 2019-04-09 ASSESSMENT — ENCOUNTER SYMPTOMS
COUGH: 0
HEMATOCHEZIA: 0
SHORTNESS OF BREATH: 0
NERVOUS/ANXIOUS: 0
DYSURIA: 0
MYALGIAS: 0
DIARRHEA: 0
CONSTIPATION: 0
EYE PAIN: 0
PARESTHESIAS: 0
HEMATURIA: 0
HEADACHES: 0
BREAST MASS: 0
FREQUENCY: 0
ARTHRALGIAS: 0
WEAKNESS: 0
DIZZINESS: 0
PALPITATIONS: 1
CHILLS: 0
ABDOMINAL PAIN: 0
JOINT SWELLING: 0
FEVER: 0
NAUSEA: 0
HEARTBURN: 0
SORE THROAT: 0

## 2019-04-09 ASSESSMENT — PAIN SCALES - GENERAL: PAINLEVEL: NO PAIN (0)

## 2019-04-09 NOTE — PROGRESS NOTES
SUBJECTIVE:   CC: Deepa Anderson is an 48 year old woman who presents for preventive health visit.     Healthy Habits:     Getting at least 3 servings of Calcium per day:  Yes    Bi-annual eye exam:  Yes    Dental care twice a year:  Yes    Sleep apnea or symptoms of sleep apnea:  Daytime drowsiness    Diet:  Regular (no restrictions)    Frequency of exercise:  2-3 days/week    Duration of exercise:  15-30 minutes    Taking medications regularly:  Yes    Medication side effects:  None    PHQ-2 Total Score: 0    Additional concerns today:  No      Today's PHQ-2 Score:   PHQ-2 ( 1999 Pfizer) 4/8/2019   Q1: Little interest or pleasure in doing things 0   Q2: Feeling down, depressed or hopeless 0   PHQ-2 Score 0   Q1: Little interest or pleasure in doing things Not at all   Q2: Feeling down, depressed or hopeless Not at all   PHQ-2 Score 0       Abuse: Current or Past(Physical, Sexual or Emotional)- No  Do you feel safe in your environment? Yes    Social History     Tobacco Use     Smoking status: Light Tobacco Smoker     Packs/day: 0.50     Years: 25.00     Pack years: 12.50     Types: Cigarettes     Smokeless tobacco: Never Used   Substance Use Topics     Alcohol use: Yes     Comment: 3-4 drinks per week         Alcohol Use 4/8/2019   Prescreen: >3 drinks/day or >7 drinks/week? No   Prescreen: >3 drinks/day or >7 drinks/week? -   No flowsheet data found.    Reviewed orders with patient.  Reviewed health maintenance and updated orders accordingly - Yes  BP Readings from Last 3 Encounters:   04/09/19 131/86   04/03/19 134/80   12/06/18 122/76    Wt Readings from Last 3 Encounters:   04/09/19 135.2 kg (298 lb)   04/03/19 133.4 kg (294 lb)   12/06/18 131.5 kg (290 lb)                  Patient Active Problem List   Diagnosis     History of dysplastic nevus     Acne vulgaris     Obesity     CARDIOVASCULAR SCREENING; LDL GOAL LESS THAN 160     H. pylori infection     Diarrhea     Tubular adenoma of colon     Abdominal  pain, epigastric     Gallstones     S/P laparoscopic cholecystectomy     Tobacco use disorder     Tear of triangular fibrocartilage of left wrist     Body mass index (BMI) of 40.0-44.9 in adult (H)     Past Surgical History:   Procedure Laterality Date     ABDOMEN SURGERY   &      births     BIOPSY  2015    polyp removed     C  DELIVERY ONLY      x2/'02,'06     CHOLECYSTECTOMY  06/15/2016     COLONOSCOPY  2015     COMBINED ESOPHAGOSCOPY, GASTROSCOPY, DUODENOSCOPY (EGD) WITH CO2 INSUFFLATION N/A 2017    Procedure: COMBINED ESOPHAGOSCOPY, GASTROSCOPY, DUODENOSCOPY (EGD) WITH CO2 INSUFFLATION;  EGD-MASS OF NECK/ DELL;  Surgeon: Sohan Renee MD;  Location: [facility]     CYSTECTOMY PILONIDAL       EXCISE MASS NECK Left 2017    Procedure: EXCISE MASS NECK;;  Surgeon: Rayray Morton MD;  Location: [facility]     HEAD & NECK SURGERY  2017    brachial cleft cyst     LARYNGOSCOPY WITH BIOPSY(IES) N/A 2017    Procedure: LARYNGOSCOPY WITH BIOPSY(IES);  Direct laryngoscopy with biopsy and excision of left neck mass;  Surgeon: Rayray Morton MD;  Location: [facility] OR     SOFT TISSUE SURGERY      Pilonidal cyst surgery     TUBAL LIGATION  '       Social History     Tobacco Use     Smoking status: Light Tobacco Smoker     Packs/day: 0.50     Years: 25.00     Pack years: 12.50     Types: Cigarettes     Smokeless tobacco: Never Used   Substance Use Topics     Alcohol use: Yes     Comment: 3-4 drinks per week     Family History   Problem Relation Age of Onset     Arthritis Mother      Lipids Mother      Hypertension Mother      Obesity Mother      Thyroid Disease Mother      Cardiovascular Father      Hypertension Father      Cerebrovascular Disease Father      Hypertension Brother      Hypertension Brother      Cancer No family hx of      Glaucoma No family hx of      Macular Degeneration No family hx of      Diabetes No family hx of          Current Outpatient  Medications   Medication Sig Dispense Refill     citalopram (CELEXA) 20 MG tablet Take 1 tablet (20 mg) by mouth daily 90 tablet 3     omeprazole (PRILOSEC) 20 MG DR capsule Take 1 capsule (20 mg) by mouth daily 90 capsule 3     terbinafine (LAMISIL) 250 MG tablet Take 1 tablet (250 mg) by mouth daily (Patient not taking: Reported on 2019) 30 tablet 2     Allergies   Allergen Reactions     Percodan [Aspirin]        Mammogram Screening: Patient under age 50, mutual decision reflected in health maintenance.      Pertinent mammograms are reviewed under the imaging tab.  History of abnormal Pap smear:   NO - age 30-65 PAP every 5 years with negative HPV co-testing recommended  Last 3 Pap and HPV Results:   PAP / HPV Latest Ref Rng & Units 2018 2013 11/3/2010   PAP - NIL NIL NIL   HPV 16 DNA NEG:Negative Negative - -   HPV 18 DNA NEG:Negative Negative - -   OTHER HR HPV NEG:Negative Negative - -     PAP / HPV Latest Ref Rng & Units 2018 2013 11/3/2010   PAP - NIL NIL NIL   HPV 16 DNA NEG:Negative Negative - -   HPV 18 DNA NEG:Negative Negative - -   OTHER HR HPV NEG:Negative Negative - -     Reviewed and updated as needed this visit by clinical staff         Reviewed and updated as needed this visit by Provider        Past Medical History:   Diagnosis Date     Acne      Actinic keratosis 2010    porokeratosis R hand     History of atypical/dysplastic nevus     back and R thigh.     Obesity       Past Surgical History:   Procedure Laterality Date     ABDOMEN SURGERY   &      births     BIOPSY  2015    polyp removed     C  DELIVERY ONLY      x2/'02,'06     CHOLECYSTECTOMY  06/15/2016     COLONOSCOPY  2015     COMBINED ESOPHAGOSCOPY, GASTROSCOPY, DUODENOSCOPY (EGD) WITH CO2 INSUFFLATION N/A 2017    Procedure: COMBINED ESOPHAGOSCOPY, GASTROSCOPY, DUODENOSCOPY (EGD) WITH CO2 INSUFFLATION;  EGD-MASS OF NECK/ DELL;  Surgeon: Sohan Renee MD;   [facility]: [facility]     CYSTECTOMY PILONIDAL  '05     EXCISE MASS NECK Left 6/29/2017    Procedure: EXCISE MASS NECK;;  Surgeon: Rayray Morton MD;  Location: [facility]     HEAD & NECK SURGERY  June 2017    brachial cleft cyst     LARYNGOSCOPY WITH BIOPSY(IES) N/A 6/29/2017    Procedure: LARYNGOSCOPY WITH BIOPSY(IES);  Direct laryngoscopy with biopsy and excision of left neck mass;  Surgeon: Rayray Morton MD;  Location: [facility]     SOFT TISSUE SURGERY  2005    Pilonidal cyst surgery     TUBAL LIGATION  '06       Review of Systems   Constitutional: Negative for chills and fever.   HENT: Negative for congestion, ear pain, hearing loss and sore throat.    Eyes: Negative for pain and visual disturbance.   Respiratory: Negative for cough and shortness of breath.    Cardiovascular: Positive for palpitations. Negative for chest pain and peripheral edema.   Gastrointestinal: Negative for abdominal pain, constipation, diarrhea, heartburn, hematochezia and nausea.   Breasts:  Negative for tenderness, breast mass and discharge.   Genitourinary: Negative for dysuria, frequency, genital sores, hematuria, pelvic pain, urgency, vaginal bleeding and vaginal discharge.   Musculoskeletal: Negative for arthralgias, joint swelling and myalgias.   Skin: Negative for rash.   Neurological: Negative for dizziness, weakness, headaches and paresthesias.   Psychiatric/Behavioral: Negative for mood changes. The patient is not nervous/anxious.           OBJECTIVE:   /86   Pulse 72   Temp 98.2  F (36.8  C) (Oral)   Wt 135.2 kg (298 lb)   SpO2 93%   BMI 42.15 kg/m    Physical Exam  GENERAL: healthy, alert and no distress  EYES: Eyes grossly normal to inspection, PERRL and conjunctivae and sclerae normal  HENT: ear canals and TM's normal, nose and mouth without ulcers or lesions  NECK: no adenopathy, no asymmetry, masses, or scars and thyroid normal to palpation  RESP: lungs clear to auscultation - no rales, rhonchi or wheezes  BREAST:  "normal without masses, tenderness or nipple discharge and no palpable axillary masses or adenopathy  CV: regular rate and rhythm, normal S1 S2, no S3 or S4, no murmur, click or rub, no peripheral edema and peripheral pulses strong  ABDOMEN: soft, nontender, no hepatosplenomegaly, no masses and bowel sounds normal  MS: no gross musculoskeletal defects noted, no edema  SKIN: no suspicious lesions or rashes  NEURO: Normal strength and tone, mentation intact and speech normal  PSYCH: mentation appears normal, affect normal/bright    Diagnostic Test Results:  none     ASSESSMENT/PLAN:   1. Routine general medical examination at a health care facility  Health maintenance reviewed and updated.    2. Screen for colon cancer  3. Tubular adenoma of colon  She had a polyp removed previously and due for repeat screening.   - GASTROENTEROLOGY ADULT REF PROCEDURE ONLY [facility] [phone]; [facility]    4. GERD without esophagitis  Stable, refills given.   - omeprazole (PRILOSEC) 20 MG DR capsule; Take 1 capsule (20 mg) by mouth daily  Dispense: 90 capsule; Refill: 3    5. Perimenopause  Stable, refills given.   - citalopram (CELEXA) 20 MG tablet; Take 1 tablet (20 mg) by mouth daily  Dispense: 90 tablet; Refill: 3    6. Body mass index (BMI) of 40.0-44.9 in adult (H)  Encouraged to work on eating a healthy diet and routine exercise.   She was given contact information for Dr. Morelos in [facility].     7. Tobacco use      COUNSELING:  Reviewed preventive health counseling, as reflected in patient instructions       Regular exercise       Healthy diet/nutrition       (Karlee)menopause management    BP Readings from Last 1 Encounters:   04/09/19 131/86     Estimated body mass index is 42.15 kg/m  as calculated from the following:    Height as of 4/3/19: 1.791 m (5' 10.5\").    Weight as of this encounter: 135.2 kg (298 lb).    BP Screening:   Last 3 BP Readings:    BP Readings from Last 3 Encounters:   04/09/19 " 131/86   04/03/19 134/80   12/06/18 122/76       The following was recommended to the patient:  Re-screen BP within a year and recommended lifestyle modifications  Weight management plan: Discussed healthy diet and exercise guidelines     reports that she has been smoking cigarettes.  She has a 12.50 pack-year smoking history. She has never used smokeless tobacco.  Tobacco Cessation Action Plan: Information offered: Patient not interested at this time    Counseling Resources:  ATP IV Guidelines  Pooled Cohorts Equation Calculator  Breast Cancer Risk Calculator  FRAX Risk Assessment  ICSI Preventive Guidelines  Dietary Guidelines for Americans, 2010  USDA's MyPlate  ASA Prophylaxis  Lung CA Screening    Kristen M. Kehr, PA-C  [facility]

## 2019-04-09 NOTE — NURSING NOTE
"Chief Complaint   Patient presents with     Physical       Initial /86   Pulse 72   Temp 98.2  F (36.8  C) (Oral)   Wt 135.2 kg (298 lb)   SpO2 93%   BMI 42.15 kg/m   Estimated body mass index is 42.15 kg/m  as calculated from the following:    Height as of 4/3/19: 1.791 m (5' 10.5\").    Weight as of this encounter: 135.2 kg (298 lb).  Medication Reconciliation: complete    ASAD Cotto MA    "

## 2019-04-09 NOTE — PATIENT INSTRUCTIONS
Preventive Health Recommendations  Female Ages 40 to 49    Yearly exam:     See your health care provider every year in order to  1. Review health changes.   2. Discuss preventive care.    3. Review your medicines if your doctor prescribed any.      Get a Pap test every three years (unless you have an abnormal result and your provider advises testing more often).      If you get Pap tests with HPV test, you only need to test every 5 years, unless you have an abnormal result. You do not need a Pap test if your uterus was removed (hysterectomy) and you have not had cancer.      You should be tested each year for STDs (sexually transmitted diseases), if you're at risk.     Ask your doctor if you should have a mammogram.      Have a colonoscopy (test for colon cancer) if someone in your family has had colon cancer or polyps before age 50.       Have a cholesterol test every 5 years.       Have a diabetes test (fasting glucose) after age 45. If you are at risk for diabetes, you should have this test every 3 years.    Shots: Get a flu shot each year. Get a tetanus shot every 10 years.     Nutrition:     Eat at least 5 servings of fruits and vegetables each day.    Eat whole-grain bread, whole-wheat pasta and brown rice instead of white grains and rice.    Get adequate Calcium and Vitamin D.      Lifestyle    Exercise at least 150 minutes a week (an average of 30 minutes a day, 5 days a week). This will help you control your weight and prevent disease.    Limit alcohol to one drink per day.    No smoking.     Wear sunscreen to prevent skin cancer.    See your dentist every six months for an exam and cleaning.        Appointment with Michelle Morelos in [facility] for weight loss management. [phone]

## 2019-04-11 ENCOUNTER — TELEPHONE (OUTPATIENT)
Dept: FAMILY MEDICINE | Facility: CLINIC | Age: 49
End: 2019-04-11

## 2019-04-11 NOTE — TELEPHONE ENCOUNTER
Called pt and made appointment for 5/6/19 at 11:30 and weight management consult.  Flores YUNG CMA

## 2019-04-11 NOTE — TELEPHONE ENCOUNTER
Reason for call:  Other   Patient called regarding (reason for call): call back  Additional comments: Patient was referred by Dr. Kehr to see Dr. Morelos for weight loss management. Wondering if she will accept patient. Please call to follow up.    Phone number to reach patient:  Cell number on file:    Telephone Information:   Mobile [phone]       Best Time:  anytime    Can we leave a detailed message on this number?  YES

## 2019-05-06 ENCOUNTER — OFFICE VISIT (OUTPATIENT)
Dept: INTERNAL MEDICINE | Facility: CLINIC | Age: 49
End: 2019-05-06
Payer: COMMERCIAL

## 2019-05-06 VITALS
SYSTOLIC BLOOD PRESSURE: 124 MMHG | HEART RATE: 67 BPM | OXYGEN SATURATION: 97 % | DIASTOLIC BLOOD PRESSURE: 84 MMHG | WEIGHT: 293 LBS | TEMPERATURE: 97.3 F | BODY MASS INDEX: 42.15 KG/M2

## 2019-05-06 DIAGNOSIS — Z91.89 SEDENTARY LIFESTYLE: ICD-10-CM

## 2019-05-06 DIAGNOSIS — K21.9 GASTROESOPHAGEAL REFLUX DISEASE, ESOPHAGITIS PRESENCE NOT SPECIFIED: ICD-10-CM

## 2019-05-06 DIAGNOSIS — E78.1 HYPERTRIGLYCERIDEMIA: ICD-10-CM

## 2019-05-06 PROCEDURE — 99214 OFFICE O/P EST MOD 30 MIN: CPT | Performed by: INTERNAL MEDICINE

## 2019-05-06 ASSESSMENT — PAIN SCALES - GENERAL: PAINLEVEL: NO PAIN (0)

## 2019-05-06 NOTE — PATIENT INSTRUCTIONS
Fitness  - free online workouts.  The health  will contact you.  The Plate Method form of eating.  Strength training is needed 3 times per week.   Ok to use a meal replacement for breakfast.   Consideration for Topamax or Contrave in the future.      Common Non-starchy Vegetables- eat unlimited  The following is a list of common non-starchy vegetables:  Amaranth or Chinese spinach   Artichoke   Artichoke hearts   Asparagus   Baby corn   Bamboo shoots   Beans (green, wax, Italian)   Bean sprouts   Beets   [place] sprouts   Broccoli   Cabbage (green, bok rober, Chinese)   Carrots   Cauliflower   Celery   Chayote   Coleslaw (packaged, no dressing)   Cucumber   Daikon   Eggplant   Greens (tracy, kale, mustard, turnip)   Hearts of palm   Jicama   Kohlrabi   Leeks   Mushrooms   Okra   Onions   Pea pods   Peppers   Radishes   Rutabaga   Salad greens (chicory, endive, escarole, lettuce, destiny, spinach, arugula, radicchio, watercress)   Sprouts   Squash (cushaw, summer, crookneck, spaghetti, zucchini)   Sugar snap peas   Swiss chard   Tomato   Turnips   Water chestnuts   Yard-long beans

## 2019-05-06 NOTE — PROGRESS NOTES
INTERNAL MEDICINE  Medical Weight Loss - Initial Evaluation      Primary Care Physician: Kehr, Kristen M    Chief Complaint:   Chief Complaint   Patient presents with     Weight Check     consult       Wt Readings from Last 5 Encounters:   05/06/19 135.2 kg (298 lb)   04/09/19 135.2 kg (298 lb)   04/03/19 133.4 kg (294 lb)   12/06/18 131.5 kg (290 lb)   03/19/18 129.7 kg (286 lb)        HISTORY OF PRESENT ILLNESS (HPI):    Patient is 48 year old year old female who is here for medical weight loss initial evaluation.   The patient has had problems with her weight, starting at the age of  kid.    History:  She over-ate as a kid due to family customs.  She did fen-phen in the mid 1990's- no known complications but did work well.  She was 300 pounds in 2002 when delivering her child.  She then lost 80 pounds after delivery.       Diet:  Breakfast - skips, does coffee and creamer.   Mid morning - chex mix with almonds  Lunch - smart one  Mid afternoon- sometimes snack - mini candy bar  Doesn't drink soda.   Dinner - cereal and milk, sandwich.    Doesn't eat out at work.  Non cooked veggies don;t agree with her stomach    Exercise Habits: A year ago she belonged to a gym but didn't see anything drastic.    She doesn't have a program now but does clean the house for exercise.  Fatigue impacts her ability to exercise    She does snore.  No history of apnea.  She will wake herself up.  She has fatigue.     She will sometimes have significant cravings but attributes this to her period.     Patient reports a pattern of gradual weight gain, with worsening of weight gain due to Childbirth.      She desires to lose weight to Get healthier and Improve self worth.    Lowest adult weight was 215 lbs.  Highest adult weight was 298 lbs 0 oz   lbs.   Patient feels her goal weight is 200 lbs.     Previous weight loss efforts: Weight Watchers online - didn't work for her.       Patient has not had weight loss surgery.  Patient has not  "considered weight loss surgery. \"against weight loss surgery\"  Patient has tried weight loss medication - phen fen .  Patient has  Considered weight loss medication.    OBESITY RELATED COMORBIDITIES:   dyslipidemia and gastroesophageal reflux disease    PAST SURGICAL HISTORY:   Past Surgical History:   Procedure Laterality Date     ABDOMEN SURGERY   &      births     BIOPSY  2015    polyp removed     C  DELIVERY ONLY      x2/'02,'06     CHOLECYSTECTOMY  06/15/2016     COLONOSCOPY  2015     COMBINED ESOPHAGOSCOPY, GASTROSCOPY, DUODENOSCOPY (EGD) WITH CO2 INSUFFLATION N/A 2017    Procedure: COMBINED ESOPHAGOSCOPY, GASTROSCOPY, DUODENOSCOPY (EGD) WITH CO2 INSUFFLATION;  EGD-MASS OF NECK/ DELL;  Surgeon: Sohan Renee MD;  Location: [facility]     CYSTECTOMY PILONIDAL  '     EXCISE MASS NECK Left 2017    Procedure: EXCISE MASS NECK;;  Surgeon: Rayray Morton MD;  Location: [facility]     HEAD & NECK SURGERY  2017    brachial cleft cyst     LARYNGOSCOPY WITH BIOPSY(IES) N/A 2017    Procedure: LARYNGOSCOPY WITH BIOPSY(IES);  Direct laryngoscopy with biopsy and excision of left neck mass;  Surgeon: Rayray Morton MD;  Location: [facility]     SOFT TISSUE SURGERY      Pilonidal cyst surgery     TUBAL LIGATION  '     WRIST SURGERY Left 2018    TFCC repair, removal of bone chip at [facility] in [place]       PAST MEDICAL HISTORY:  Past Medical History:   Diagnosis Date     Acne      Actinic keratosis 2010    porokeratosis R hand     History of atypical/dysplastic nevus     back and R thigh.     Obesity        MEDICATIONS:   Current Outpatient Medications   Medication Sig Dispense Refill     citalopram (CELEXA) 20 MG tablet Take 1 tablet (20 mg) by mouth daily 90 tablet 3     omeprazole (PRILOSEC) 20 MG DR capsule Take 1 capsule (20 mg) by mouth daily 90 capsule 3     terbinafine (LAMISIL) 250 MG tablet Take 1 tablet (250 mg) by mouth daily (Patient not " taking: Reported on 4/9/2019) 30 tablet 2       ALLERGIES:   Allergies   Allergen Reactions     Percodan [Aspirin]        SOCIAL HISTORY:   Social History     Socioeconomic History     Marital status:      Spouse name: Not on file     Number of children: Not on file     Years of education: Not on file     Highest education level: Not on file   Occupational History     Employer: [employer]   Social Needs     Financial resource strain: Not on file     Food insecurity:     Worry: Not on file     Inability: Not on file     Transportation needs:     Medical: Not on file     Non-medical: Not on file   Tobacco Use     Smoking status: Light Tobacco Smoker     Packs/day: 0.50     Years: 25.00     Pack years: 12.50     Types: Cigarettes     Smokeless tobacco: Never Used   Substance and Sexual Activity     Alcohol use: Yes     Comment: 3-4 drinks per week     Drug use: No     Sexual activity: Yes     Partners: Male     Birth control/protection: Female Surgical, None     Comment: Tubes tied 2006   Lifestyle     Physical activity:     Days per week: Not on file     Minutes per session: Not on file     Stress: Not on file   Relationships     Social connections:     Talks on phone: Not on file     Gets together: Not on file     Attends Worship service: Not on file     Active member of club or organization: Not on file     Attends meetings of clubs or organizations: Not on file     Relationship status: Not on file     Intimate partner violence:     Fear of current or ex partner: Not on file     Emotionally abused: Not on file     Physically abused: Not on file     Forced sexual activity: Not on file   Other Topics Concern     Parent/sibling w/ CABG, MI or angioplasty before 65F 55M? No   Social History Narrative     Not on file       FAMILY HISTORY:   Family History   Problem Relation Age of Onset     Arthritis Mother      Lipids Mother      Hypertension Mother      Obesity Mother      Thyroid Disease Mother       Cardiovascular Father      Hypertension Father      Cerebrovascular Disease Father      Hypertension Brother      Hypertension Brother      Cancer No family hx of      Glaucoma No family hx of      Macular Degeneration No family hx of      Diabetes No family hx of        REVIEW OF SYSTEMS:   ROS: 10 point ROS neg other than the symptoms noted above in the HPI.   PSYCH: NEGATIVE for  anxiety or depression,  panic attacks or suicide attempts, emotional eating, binge eating, or h/o eating disorder or eating disorder treatment. Denies abuse.     PHYSICAL EXAMINATION:    VITALS: /84 (BP Location: Right arm, Cuff Size: Adult Large)   Pulse 67   Temp 97.3  F (36.3  C) (Oral)   Wt 135.2 kg (298 lb)   SpO2 97%   Breastfeeding? No   BMI 42.15 kg/m    GENERAL: Patient is a 48 year old year old female is in no acute distress.  Patient is alert and orientated x 4, pleasant and cooperative with exam. Mood and affect are appropriate.  HEENT:  No conjunctival injection or icterus. Dentition WNL.  NECK: is supple without lymphadenopathy, thyroidmegaly, or mass.    CARDIOVASCULAR:  Regular rate and rhythm without murmurs, rubs, or gallops.  RESPIRATORY:  Lungs are clear to auscultation bilaterally, respiratory effort is normal.   GASTROINTESTINAL:  Abdomen is obese, soft, nontender, without obvious organomegaly or masses.  Positive bowel sounds throughout. No bruits.  LOWER EXTREMITIES:  No edema, cyanosis, ulceration, or chronic venous stasis noted bilaterally.  MUSCULOSKELETAL:  Moves all 4 extremities equal and strong    NEUROLOGIC:  Cranial nerves II-XII grossly intact. Gait appears normal.   SKIN:  No intertiginous irritation or rash.    PSYCH:  Mentation appears normal, affect normal/ bright        LAB RESULTS:   Reviewed in Epic    ASSESSMENT/PLAN:    1. Body mass index (BMI) of 40.0-44.9 in adult (H)  She really doesn't have issues with cravings, fullness or satiation.  I am not certain that she will have any  benefit from medications.  Cannot use Belviq due to history of long term fen-phen use.  Doesn't want to use a glp1 due to daily injections.      Per patient instructions.   - HEALTH  REFERRAL    2. Hypertriglyceridemia  Would improve with weight loss     3. Gastroesophageal reflux disease, esophagitis presence not specified  Would improve with weight loss     4. Sedentary lifestyle    - HEALTH  REFERRAL       Patient Instructions   Fitness  - free online workouts.  The health  will contact you.  The Plate Method form of eating.  Strength training is needed 3 times per week.   Ok to use a meal replacement for breakfast.   Consideration for Topamax or Contrave in the future.      Common Non-starchy Vegetables- eat unlimited  The following is a list of common non-starchy vegetables:  Amaranth or Chinese spinach   Artichoke   Artichoke hearts   Asparagus   Baby corn   Bamboo shoots   Beans (green, wax, Italian)   Bean sprouts   Beets   [place] sprouts   Broccoli   Cabbage (green, bok rober, Chinese)   Carrots   Cauliflower   Celery   Chayote   Coleslaw (packaged, no dressing)   Cucumber   Daikon   Eggplant   Greens (tracy, kale, mustard, turnip)   Hearts of palm   Jicama   Kohlrabi   Leeks   Mushrooms   Okra   Onions   Pea pods   Peppers   Radishes   Rutabaga   Salad greens (chicory, endive, escarole, lettuce, destiny, spinach, arugula, radicchio, watercress)   Sprouts   Squash (cushaw, summer, crookneck, spaghetti, zucchini)   Sugar snap peas   Swiss chard   Tomato   Turnips   Water chestnuts   Yard-long beans            I spent >25 minutes of time with the patient and >50% of it was in education and counseling regarding weight management.

## 2019-05-07 ENCOUNTER — PATIENT OUTREACH (OUTPATIENT)
Dept: NURSING | Facility: CLINIC | Age: 49
End: 2019-05-07

## 2019-05-30 ENCOUNTER — MYC MEDICAL ADVICE (OUTPATIENT)
Dept: FAMILY MEDICINE | Facility: CLINIC | Age: 49
End: 2019-05-30

## 2019-05-30 DIAGNOSIS — N95.1 PERIMENOPAUSE: Primary | ICD-10-CM

## 2019-05-30 DIAGNOSIS — N92.4 ABNORMAL PERIMENOPAUSAL BLEEDING: ICD-10-CM

## 2019-05-30 NOTE — TELEPHONE ENCOUNTER
Per protocol, will route encounter to be cosigned by provider for Verbal Orders. OB/GYN referral provider per [facility] per Kristen Kehr, PA-C.    Triage advice given per Telephone Triage Protocols for Nurses, Fifth Edition, Marisol Alexander/Ronald (menstrual problems).    ORI SantaN, RN

## 2019-06-10 ENCOUNTER — OFFICE VISIT (OUTPATIENT)
Dept: OBGYN | Facility: OTHER | Age: 49
End: 2019-06-10
Payer: COMMERCIAL

## 2019-06-10 VITALS
BODY MASS INDEX: 42.86 KG/M2 | HEART RATE: 60 BPM | SYSTOLIC BLOOD PRESSURE: 138 MMHG | WEIGHT: 293 LBS | DIASTOLIC BLOOD PRESSURE: 88 MMHG

## 2019-06-10 DIAGNOSIS — N92.1 MENOMETRORRHAGIA: Primary | ICD-10-CM

## 2019-06-10 DIAGNOSIS — F17.200 TOBACCO USE DISORDER: ICD-10-CM

## 2019-06-10 LAB
HGB BLD-MCNC: 13 G/DL (ref 11.7–15.7)
TSH SERPL DL<=0.005 MIU/L-ACNC: 2.1 MU/L (ref 0.4–4)

## 2019-06-10 PROCEDURE — 84443 ASSAY THYROID STIM HORMONE: CPT | Performed by: OBSTETRICS & GYNECOLOGY

## 2019-06-10 PROCEDURE — 88305 TISSUE EXAM BY PATHOLOGIST: CPT | Performed by: OBSTETRICS & GYNECOLOGY

## 2019-06-10 PROCEDURE — 99204 OFFICE O/P NEW MOD 45 MIN: CPT | Mod: 25 | Performed by: OBSTETRICS & GYNECOLOGY

## 2019-06-10 PROCEDURE — 85018 HEMOGLOBIN: CPT | Performed by: OBSTETRICS & GYNECOLOGY

## 2019-06-10 PROCEDURE — 58100 BIOPSY OF UTERUS LINING: CPT | Performed by: OBSTETRICS & GYNECOLOGY

## 2019-06-10 PROCEDURE — 36415 COLL VENOUS BLD VENIPUNCTURE: CPT | Performed by: OBSTETRICS & GYNECOLOGY

## 2019-06-10 NOTE — PATIENT INSTRUCTIONS
Endometrial Biopsy  Endometrial biopsy is a procedure used to study the endometrium (lining of the uterus). It is usually done in your healthcare provider s office. During the biopsy, small tissue samples are taken from the uterine lining. These are then sent to a lab for study. If any problems are found, you and your healthcare provider will discuss treatment options. The biopsy usually takes less than 20 minutes, and you can often go back to your normal routine as soon as the procedure is over.   Reasons for the Procedure  Endometrial biopsy may help pinpoint the cause of certain problems. These include:    Bleeding after menopause    Heavy or irregular periods    Bleeding associated with hormone replacement therapy    Prolonged bleeding    Abnormal Pap test results    Trouble getting pregnant (fertility problems)    What Are the Risks?  Problems with endometrial biopsy are rare, but can include:    Bleeding    Infection    Damage to the uterine wall (very rare)  Getting Ready for the Procedure  Your doctor will ask about your health and any medications you take, such as blood thinners. Before your biopsy, you may have tests to make sure you re not pregnant or have an infection. You may also be asked to sign a consent form. A day or two before the procedure:     Avoid using creams or other vaginal medications.    Avoid douching.    Ask your healthcare provider if you should take pain medications shortly before the test.   During the Biopsy    You will be asked to lie on an exam table with your knees bent, just as you do for a Pap test.    You may have a brief pelvic exam. An instrument called a speculum is then inserted into the vagina to hold it open.    An antiseptic solution is applied to the cervix. The cervix may also be numbed with an anesthetic or dilated to widen the opening.    A small suction tube is passed through the cervix into the uterus.    It is normal to feel some cramping when the tube is  inserted. But tell your healthcare provider if you have severe cramping or are very uncomfortable.    Using mild suction, samples are taken from the uterine lining. You may feel pinching or additional cramping when this is done.    The tube and speculum are then removed and the samples sent to a lab for study.  After the Procedure    If you feel lightheaded or dizzy, you can rest on the table until you re ready to get dressed.    For a few hours, you may feel some mild cramping. This can usually be relieved with over-the-counter pain medications.    You may have some bleeding for a few days. Use pads instead of tampons.    Don t douche or use any vaginal medications unless your healthcare provider says it s okay.    Ask your healthcare provider when it s okay to have sex again.  Follow-Up  It will take about a week for the biopsy results to come back from the lab. Then you and your healthcare provider can discuss the results. These may show that no treatment is required. Or, you may be scheduled for a follow-up appointment and further tests. If your biopsy was done for fertility problems, be sure to record the day when your next period begins.     Call your healthcare provider if you have:    Heavy bleeding (more than a pad an hour for 2 hours).    Severe cramping, or increasing pain.    Fever over 101 F.    Foul-smelling or unusual vaginal discharge.     What you may expect after an endometrial biopsy:  Mild cramping for less than 48 hours is to be expected, if you have can take ibuprofen or Motrin you may use this for the cramping.   A small amount of bleeding would be considered normal as well.    You may resume your normal activities including sexual intercourse as soon as you feel ready.       WARNING SIGNS:  If you are experiencing:  Fever  Foul smelling vaginal discharge  Cramping lasting longer than 48 hours  Severe cramping  Bleeding heavier than a period  CALL THE CLINIC IMMEDIATELY    You will be  contacted with the results of your biopsy in about one week.   A follow up plan will be made with you when your results are available.     Nydia Vyas

## 2019-06-10 NOTE — PROGRESS NOTES
Subjective  48 year old non-pregnant female presents today complaining of menometrorrhagia.  Patient states she has been bleeding for the last 5 weeks.  She has had steady vaginal bleeding the whole time.  She is passing large clots.  Patient is using pads and is having to change frequently on the heavier days.  She feels large gushes.  Some dizziness.  Currently bleeding.  Minimal dysmenorrhea.  Up until this she has somewhat normal menses.  Almost 2 years ago she went 2 months without a menses but it has been regular since then.  No problems urinating.  Normal bowel movements.  Patient is not sexually active due to this and being perimenopausal.  It has been over a year.  2 c sections.  Patient's mother was adopted.  No known female cancer in the family.  + 1/2 pack per day of tobacco.  Patient works as a .  Patient had a tubal ligation with the last c section.  Morales perry as well.  We discussed medical vs surgical options.  Patient is considering an endometrial ablation as she does not want to be on hormones.  Due to tobacco abuse we discussed progesterone.  I recommended the endometrial biopsy today.  She is not passing large clots today.  She will schedule the pelvic ultrasound.           ROS: 10 point ROS neg other than the symptoms noted above in the HPI.  Past Medical History:   Diagnosis Date     Acne      Actinic keratosis 2010    porokeratosis R hand     History of atypical/dysplastic nevus     back and R thigh.     Obesity      Past Surgical History:   Procedure Laterality Date     ABDOMEN SURGERY   &      births     BIOPSY  2015    polyp removed     C  DELIVERY ONLY      x2/'02,'06     CHOLECYSTECTOMY  06/15/2016     COLONOSCOPY  2015     COMBINED ESOPHAGOSCOPY, GASTROSCOPY, DUODENOSCOPY (EGD) WITH CO2 INSUFFLATION N/A 2017    Procedure: COMBINED ESOPHAGOSCOPY, GASTROSCOPY, DUODENOSCOPY (EGD) WITH CO2 INSUFFLATION;  EGD-MASS OF NECK/ DELL;   Surgeon: Sohan Renee MD;  Location: [facility]     CYSTECTOMY PILONIDAL  '05     EXCISE MASS NECK Left 6/29/2017    Procedure: EXCISE MASS NECK;;  Surgeon: Rayray Morton MD;  Location: [facility]     HEAD & NECK SURGERY  June 2017    brachial cleft cyst     LARYNGOSCOPY WITH BIOPSY(IES) N/A 6/29/2017    Procedure: LARYNGOSCOPY WITH BIOPSY(IES);  Direct laryngoscopy with biopsy and excision of left neck mass;  Surgeon: Rayray Morton MD;  Location: [facility]     SOFT TISSUE SURGERY  2005    Pilonidal cyst surgery     TUBAL LIGATION  '06     WRIST SURGERY Left 12/14/2018    TFCC repair, removal of bone chip at [facility] in [place]     Family History   Problem Relation Age of Onset     Arthritis Mother      Lipids Mother      Hypertension Mother      Obesity Mother      Thyroid Disease Mother      Cardiovascular Father      Hypertension Father      Cerebrovascular Disease Father      Hypertension Brother      Hypertension Brother      Cancer No family hx of      Glaucoma No family hx of      Macular Degeneration No family hx of      Diabetes No family hx of      Social History     Tobacco Use     Smoking status: Light Tobacco Smoker     Packs/day: 0.50     Years: 25.00     Pack years: 12.50     Types: Cigarettes     Smokeless tobacco: Current User   Substance Use Topics     Alcohol use: Yes     Comment: 3-4 drinks per week         Objective  Vitals: /88   Pulse 60   Wt 137.4 kg (303 lb)   LMP 05/06/2019 (Approximate)   BMI 42.86 kg/m    BMI= Body mass index is 42.86 kg/m .    General appearance=well developed, well-nourished female  Psych=mood is stable  Skin=no rashes or lesions seen  Neck=overall appearance is normal  Thyroid=no enlargement  Lungs=non-labored breathing, no use of accessory muscles   Abd=soft, Nontender/nondistended, no masses, no signs of hernias, no evidence of hepatosplenomegaly  PELVIC:    External genitalia: normal without lesions or masses  Urethral meatus: no lesions or prolapse  noted, normal size  Urethra: no masses, non tender  Bladder: non tender, no fullness  Vagina: normal mucosa and rugae, no discharge, blood in vaginal vault  Cervix: normal without lesion, no cervical motion tenderness, healthy, nulliparous  Uterus: small, mobile, nontender.  Adnexa: non tender, without masses  Rectal: deffered  Ext=no clubbing or cyanosis, no swelling      Procedure:  Endometrial biopsy    Indication: Menometrorrhagia with age >35                                      Discussed risk of bleeding, infection, uterine perforation, cramping pain.  Pt agreed to proceed with procedure after all questions answered.    Speculum placed and cervix visualized.  Cervix cleansed with betadine x 3.  Tenaculum placed on anterior lip of the cervix.  Endometrial biopsy pipelle passed through cervix and uterus sounded to 8 cm.  Biopsy specimen collected with one pass with return of moderate amount of pink tissue.  Specimen placed in a labeled container and set aside to be sent to pathology.  Tenaculum removed from the cervix and sites hemostatic after application of Silver Nitrate.  No bleeding noted from cervical os.     Patient tolerated the procedure well.  There were no apparent complications and bleeding was minimal.    She is instructed to use no tampons and have no intercourse for the next 5 days.        Assessment  1.)  Menometrorrhagia  2.)  Tobacco abuse  3.)  MO      Plan  1.)  Endometrial biopsy   2.)  Schedule pelvic ultrasound   3.)  Follow-up to discuss surgery      30 minutes was spent face to face with the patient today discussing her history, diagnosis, and follow-up plan as noted above.  Over 50% of the visit was spent in counseling and coordination of care not including time spent on procedure.        Nursing notes read and reviewed    Nydia Vyas

## 2019-06-11 ENCOUNTER — ANCILLARY PROCEDURE (OUTPATIENT)
Dept: ULTRASOUND IMAGING | Facility: OTHER | Age: 49
End: 2019-06-11
Attending: OBSTETRICS & GYNECOLOGY
Payer: COMMERCIAL

## 2019-06-11 DIAGNOSIS — N92.1 MENOMETRORRHAGIA: ICD-10-CM

## 2019-06-11 PROCEDURE — 76830 TRANSVAGINAL US NON-OB: CPT

## 2019-06-11 PROCEDURE — 76856 US EXAM PELVIC COMPLETE: CPT

## 2019-06-12 LAB — COPATH REPORT: NORMAL

## 2019-06-21 ENCOUNTER — OFFICE VISIT (OUTPATIENT)
Dept: OBGYN | Facility: CLINIC | Age: 49
End: 2019-06-21
Payer: COMMERCIAL

## 2019-06-21 VITALS
HEART RATE: 64 BPM | WEIGHT: 293 LBS | SYSTOLIC BLOOD PRESSURE: 128 MMHG | DIASTOLIC BLOOD PRESSURE: 88 MMHG | BODY MASS INDEX: 41.73 KG/M2

## 2019-06-21 DIAGNOSIS — N92.1 MENOMETRORRHAGIA: Primary | ICD-10-CM

## 2019-06-21 PROCEDURE — 99213 OFFICE O/P EST LOW 20 MIN: CPT | Performed by: OBSTETRICS & GYNECOLOGY

## 2019-06-21 NOTE — PATIENT INSTRUCTIONS
If you have any questions regarding your visit, Please contact your care team.    Women [facility] CLINIC HOURS TELEPHONE NUMBER   Nydia Vyas M.D.    Macy Wu -         Monday-[facility]  7:00 am - 5 pm Monday's Tuesday- [facility]  8:00am- 5 pm  Wednesday- Off  Thursday- Offf Friday-Am Bob, and [facility]  Bob 8:00-11:30 AM  [place] 1:00-5:00 PM [facility]  [address] [place] [address]  [phone] ask for [facility]    Imaging [phone]    [facility]  [address] [place], [place] [address] [phone]  Imaging [phone]    [facility]  [address] [place], [place] [id number] [phone]  Imaging Scheduling - [phone]     Urgent Care locations:    [facility] Saturday and Sunday   9 am - 5 pm    Monday-Friday   12 pm - 8 pm  Saturday and Sunday   9 am - 5 pm   [phone]       If you need a medication refill, please contact your pharmacy. Please allow 3 business days for your refill to be completed.  As always, Thank you for trusting us with your healthcare needs!

## 2019-06-21 NOTE — PROGRESS NOTES
Subjective  48 year old non-pregnant female presents today to discuss a hysterectomy.  I saw patient last week when she complained of menometrorrhagia.  Patient states she has been bleeding for the last 6 weeks.  She has had steady vaginal bleeding the whole time.  She is passing large clots.  Patient is using pads and is having to change frequently on the heavier days.  She feels large gushes.  Some dizziness.  Hgb on 6/10 was 13.0.  TSH was normal as well.  Currently bleeding.  Minimal dysmenorrhea.  Up until this she has somewhat normal menses.  Almost 2 years ago she went 2 months without a menses but it has been somewhat regular since then.  No problems urinating.  Normal bowel movements.  Patient is not sexually active due to this and being perimenopausal.  It has been over a year.  2 c sections.  9lb 13oz was her largest baby.  Patient's mother was adopted.  No known female cancer in the family.  + 1/2 pack per day of tobacco.  Patient works as a .  Patient had a tubal ligation with the last c section.  Lap vicky as well.  Normal endometrial biopsy on 6/11.   We reviewed her ultrasound in detail.  We discussed medical vs surgical options.  Pt is not wanting to do medical treatment.  She is wanting a hysterectomy.  We discussed this in detail.  I discussed risks, benefits, and complications of surgery including but not limited to bleeding, infection, damage to nearby organs including but not limited to bladder, bowel, ureters, nerves, and blood vessels as well as anesthesia risks.  Injury may result at the time of surgery or in a separate procedure.  We also discussed the possibility of a reoperation if the pathology came back abnormal.  All questions answered, and accepting these risks, the patient elects to proceed with the procedure.          ROS: 10 point ROS neg other than the symptoms noted above in the HPI.  Past Medical History:   Diagnosis Date     Acne      Actinic keratosis 08/2010     porokeratosis R hand     History of atypical/dysplastic nevus     back and R thigh.     Obesity      Past Surgical History:   Procedure Laterality Date     ABDOMEN SURGERY   &      births     BIOPSY  2015    polyp removed     C  DELIVERY ONLY      x2/'02,'06     CHOLECYSTECTOMY  06/15/2016     COLONOSCOPY  2015     COMBINED ESOPHAGOSCOPY, GASTROSCOPY, DUODENOSCOPY (EGD) WITH CO2 INSUFFLATION N/A 2017    Procedure: COMBINED ESOPHAGOSCOPY, GASTROSCOPY, DUODENOSCOPY (EGD) WITH CO2 INSUFFLATION;  EGD-MASS OF NECK/ DELL;  Surgeon: Sohan Renee MD;  Location:  OR     CYSTECTOMY PILONIDAL  '     EXCISE MASS NECK Left 2017    Procedure: EXCISE MASS NECK;;  Surgeon: Rayray Morton MD;  Location: [facility]     HEAD & NECK SURGERY  2017    brachial cleft cyst     LARYNGOSCOPY WITH BIOPSY(IES) N/A 2017    Procedure: LARYNGOSCOPY WITH BIOPSY(IES);  Direct laryngoscopy with biopsy and excision of left neck mass;  Surgeon: Rayray Morton MD;  Location: [facility]     SOFT TISSUE SURGERY      Pilonidal cyst surgery     TUBAL LIGATION  '06     WRIST SURGERY Left 2018    TFCC repair, removal of bone chip at [facility] in [place]     Family History   Problem Relation Age of Onset     Arthritis Mother      Lipids Mother      Hypertension Mother      Obesity Mother      Thyroid Disease Mother      Cardiovascular Father      Hypertension Father      Cerebrovascular Disease Father      Hypertension Brother      Hypertension Brother      Cancer No family hx of      Glaucoma No family hx of      Macular Degeneration No family hx of      Diabetes No family hx of      Social History     Tobacco Use     Smoking status: Light Tobacco Smoker     Packs/day: 0.50     Years: 25.00     Pack years: 12.50     Types: Cigarettes     Smokeless tobacco: Current User   Substance Use Topics     Alcohol use: Yes     Comment: 3-4 drinks per week         Objective  Vitals: /88    Pulse 64   Wt 133.8 kg (295 lb)   LMP 05/02/2019 (Approximate)   BMI 41.73 kg/m    BMI= Body mass index is 41.73 kg/m .      Physical Exam=6/10/19:  General appearance=well developed, well-nourished female  Psych=mood is stable  Skin=no rashes or lesions seen  Neck=overall appearance is normal  Thyroid=no enlargement  Lungs=non-labored breathing, no use of accessory muscles   Abd=soft, Nontender/nondistended, no masses, no signs of hernias, no evidence of hepatosplenomegaly  PELVIC:    External genitalia: normal without lesions or masses  Urethral meatus: no lesions or prolapse noted, normal size  Urethra: no masses, non tender  Bladder: non tender, no fullness  Vagina: normal mucosa and rugae, no discharge, blood in vaginal vault  Cervix: normal without lesion, no cervical motion tenderness, healthy, nulliparous  Uterus: small, mobile, nontender.  Adnexa: non tender, without masses  Rectal: deffered  Ext=no clubbing or cyanosis, no swelling      Endometrial biopsy=6/11/19:  SPECIMEN(S):   Endometrial biopsy     FINAL DIAGNOSIS:   Uterus, endometrial curettings:   - Disordered proliferative endometrium.   - No evidence of hyperplasia, atypia or malignancy.       Pelvic ultrasound=6/11/19:  FINDINGS: Uterus is 10.7 x 5.3 x 6.1 cm in size. No fibroids  identified. 1.9 cm endometrial stripe with slightly indistinct  margins.     Right ovary 3.3 x 1.7 x 1.8 cm, unremarkable. Left ovary is 5.1 x 3.2  x 3.8 cm with a simple appearing 3.1 cm cyst consistent with dominant  follicle. No adnexal mass or free fluid.     IMPRESSION;  1. Mildly thickened endometrial stripe 1.9 cm with slight indistinct  margins. Technologist comments note that the patient has been bleeding  for the last 5 weeks. Differential diagnosis includes complex  endometrial blood or fluid, endometrial hyperplasia, endometrial  cancer or endometrial polyp.  Endometrial biopsy suggested.        Assessment  1.)  Menometrorrhagia  2.)  Tobacco abuse  3.)   MO=BMI 41      Plan  1.)  Schedule TLH with bilateral salpingectomy, possible Total abdominal hysterectomy with Dr. Guzmán to assist=consent signed today  2.)  Schedule pre-op with PCP      20 minutes was spent face to face with the patient today discussing her history, diagnosis, and follow-up plan as noted above.  Over 50% of the visit was spent in counseling and coordination of care.        Nursing notes read and reviewed    Nydia Vyas

## 2019-06-24 ENCOUNTER — TELEPHONE (OUTPATIENT)
Dept: OBGYN | Facility: CLINIC | Age: 49
End: 2019-06-24

## 2019-06-24 NOTE — TELEPHONE ENCOUNTER
Associated Diagnoses     Menometrorrhagia  - Primary       Comments     Body mass index is 41.73 kg/m .           Order Questions     Question Answer Comment   Procedure name(s) - multi select Total laparoscopic hysterectomy with bilateral salpingectomy possible total abdominal hysterectomy    Is this a multi surgeon case? Yes    Comments (who/departments/details) Dr. Guzmán    Laterality N/A    Reason for procedure Menometrorrhagia    Location of Case: [facility]    Surgeon Procedure Time (incision to closure) in minutes (per procedure as applicable) 120    Note:  Surgical Case Time Needed (in minutes)   Patient Class (for admit prior to surgery, specify number of days in comments): Same day (hospital outpatient)    Why can t this outpatient surgery be done at [place] [facility] or  [facility]? Hysterectomy    Anesthesia General    H&P To Be Completed By: PCP    Post-Op Appointment 2 weeks    Vendor Needed? No

## 2019-07-01 NOTE — TELEPHONE ENCOUNTER
Called patient and gave date and time. Patient pleased packet mailed and added to both calendars    Beth Mckoy  Women[facility]

## 2019-07-01 NOTE — TELEPHONE ENCOUNTER
Surgery Scheduled.    Date of Surgery 8/13/19 Time of Surgery 8:00  Procedure: Total laparoscopic hysterectomy with bilateral salpingectomy possible total abdominal hysterectomy  Hospital/Surgical Facility: [facility]  Surgeon: Dr. Lilliam mills/ Dr. Ramirez Mendoza   Type of Anesthesia Anticipated: General  Pre-op: 8/5/19 with D[facility] Moon  at [facility]  2 week post op: 8/30/19 with Dr[facility] at  [facility]  Pre-certification 7/1/19  Consent signed: 6/21/19  Hospital Stay no    Surgery Pre-Certification    Medical Record Number: [id number]  Deepa Anderson  YOB: 1970   Phone: [phone] (home) [phone] (work)  Primary Provider: Kehr, Kristen M    Reason for Admit:  Menometrorrhagia      Surgeon: Dr. Vyas  Surgical Procedure: Total laparoscopic hysterectomy with bilateral salpingectomy possible total abdominal hysterectomy  ICD-9 Coded: N92.1  Date of Surgery: 8/13/19  Consent signed? Yes    Date signed: 6/21/19  Hospital: [facility]    Requestor:  Beth Mckoy     Location:  [facility]              mailed surgery packet mailed to patient's home address.  Patient instructed NPO 12 hours prior to surgery, arrive 1 1/2 hours prior to surgery, must have a .  Patient understood and agrees to the plan.     Beth Mckoy  Women's Health

## 2019-07-02 NOTE — TELEPHONE ENCOUNTER
No PA required for patient's Total laparoscopic hysterectomy with bilateral salpingectomy possible total abdominal hysterectomy w/ salpingo oopherectomy cpt 84529 scheduled on 8/13/19.

## 2019-07-11 ENCOUNTER — MYC MEDICAL ADVICE (OUTPATIENT)
Dept: OBGYN | Facility: OTHER | Age: 49
End: 2019-07-11

## 2019-07-11 ENCOUNTER — TELEPHONE (OUTPATIENT)
Dept: OBGYN | Facility: CLINIC | Age: 49
End: 2019-07-11

## 2019-07-11 NOTE — TELEPHONE ENCOUNTER
Form received filled out and signed. Will fax once we receive fax #    Beth Mckoy  Women's Health

## 2019-08-05 ENCOUNTER — OFFICE VISIT (OUTPATIENT)
Dept: FAMILY MEDICINE | Facility: CLINIC | Age: 49
End: 2019-08-05
Payer: COMMERCIAL

## 2019-08-05 VITALS
BODY MASS INDEX: 42.15 KG/M2 | WEIGHT: 293 LBS | SYSTOLIC BLOOD PRESSURE: 129 MMHG | DIASTOLIC BLOOD PRESSURE: 86 MMHG | TEMPERATURE: 98.5 F | HEART RATE: 75 BPM | OXYGEN SATURATION: 96 %

## 2019-08-05 DIAGNOSIS — Z01.818 PREOP GENERAL PHYSICAL EXAM: Primary | ICD-10-CM

## 2019-08-05 DIAGNOSIS — N92.4 ABNORMAL PERIMENOPAUSAL BLEEDING: ICD-10-CM

## 2019-08-05 PROCEDURE — 99214 OFFICE O/P EST MOD 30 MIN: CPT | Performed by: PHYSICIAN ASSISTANT

## 2019-08-05 ASSESSMENT — PAIN SCALES - GENERAL: PAINLEVEL: NO PAIN (0)

## 2019-08-05 NOTE — PROGRESS NOTES
[facility]  [address] [address]  [phone]  Dept: [phone]    PRE-OP EVALUATION:  Today's date: 2019    Deepa Anderson (: 1970) presents for pre-operative evaluation assessment as requested by Dr. Vyas.  She requires evaluation and anesthesia risk assessment prior to undergoing surgery/procedure for treatment of hysterectomy .    Fax number for surgical facility:   Primary Physician: Kehr, Kristen M  Type of Anesthesia Anticipated: to be determined    Patient has a Health Care Directive or Living Will:  NO    Preop Questions 2019   Who is doing your surgery? Dr. Nydia Vyas   What are you having done? Hysterectomy   Date of Surgery/Procedure: 2019   Facility or Hospital where procedure/surgery will be performed: [facility]   1.  Do you have a history of Heart attack, stroke, stent, coronary bypass surgery, or other heart surgery? No   2.  Do you ever have any pain or discomfort in your chest? No   3.  Do you have a history of  Heart Failure? No   4.   Are you troubled by shortness of breath when:  walking on a level surface, or up a slight hill, or at night? No   5.  Do you currently have a cold, bronchitis or other respiratory infection? No   6.  Do you have a cough, shortness of breath, or wheezing? No   7.  Do you sometimes get pains in the calves of your legs when you walk? No   8. Do you or anyone in your family have previous history of blood clots? YES - paternal aunt and brother   9.  Do you or does anyone in your family have a serious bleeding problem such as prolonged bleeding following surgeries or cuts? No   10. Have you ever had problems with anemia or been told to take iron pills? No   11. Have you had any abnormal blood loss such as black, tarry or bloody stools, or abnormal vaginal bleeding? YES - abnormal vaginal bleed   12. Have you ever had a blood transfusion? No   13. Have you or any of your relatives ever  had problems with anesthesia? No   14. Do you have sleep apnea, excessive snoring or daytime drowsiness? YES - snoring and daytime drowsiness   15. Do you have any prosthetic heart valves? No   16. Do you have prosthetic joints? No   17. Is there any chance that you may be pregnant? No         HPI:     HPI related to upcoming procedure: Deepa has abnormal perimenopausal bleeding and will be having a partial hysterectomy      GERD: managed with omeprazole.     She is taking citalopram for help with mood swings associated with perimenopause.     MEDICAL HISTORY:     Patient Active Problem List    Diagnosis Date Noted     Hypertriglyceridemia 05/06/2019     Priority: Medium     Gastroesophageal reflux disease, esophagitis presence not specified 05/06/2019     Priority: Medium     Sedentary lifestyle 05/06/2019     Priority: Medium     Body mass index (BMI) of 40.0-44.9 in adult (H) 04/09/2019     Priority: Medium     Tear of triangular fibrocartilage of left wrist 12/06/2018     Priority: Medium     Tobacco use disorder 12/20/2016     Priority: Medium     S/P laparoscopic cholecystectomy 06/28/2016     Priority: Medium     Gallstones 05/09/2016     Priority: Medium     Abdominal pain, epigastric 05/05/2016     Priority: Medium     Tubular adenoma of colon 10/12/2015     Priority: Medium     Colonoscopy 2015, repeat in 3 years.        Diarrhea 05/26/2015     Priority: Medium     H. pylori infection 02/04/2014     Priority: Medium     CARDIOVASCULAR SCREENING; LDL GOAL LESS THAN 160 08/22/2013     Priority: Medium     Obesity 10/25/2012     Priority: Medium     History of dysplastic nevus 04/26/2012     Priority: Medium     Acne vulgaris 04/26/2012     Priority: Medium      Past Medical History:   Diagnosis Date     Acne      Actinic keratosis 08/2010    porokeratosis R hand     History of atypical/dysplastic nevus     back and R thigh.     Obesity      Past Surgical History:   Procedure Laterality Date     ABDOMEN  SURGERY   &      births     BIOPSY  2015    polyp removed     C  DELIVERY ONLY      x2/'02,'06     CHOLECYSTECTOMY  06/15/2016     COLONOSCOPY  2015     COMBINED ESOPHAGOSCOPY, GASTROSCOPY, DUODENOSCOPY (EGD) WITH CO2 INSUFFLATION N/A 2017    Procedure: COMBINED ESOPHAGOSCOPY, GASTROSCOPY, DUODENOSCOPY (EGD) WITH CO2 INSUFFLATION;  EGD-MASS OF NECK/ DELL;  Surgeon: Sohan Renee MD;  Location: [facility]     CYSTECTOMY PILONIDAL  '     EXCISE MASS NECK Left 2017    Procedure: EXCISE MASS NECK;;  Surgeon: Rayray Morton MD;  Location: [facility]     HEAD & NECK SURGERY  2017    brachial cleft cyst     LARYNGOSCOPY WITH BIOPSY(IES) N/A 2017    Procedure: LARYNGOSCOPY WITH BIOPSY(IES);  Direct laryngoscopy with biopsy and excision of left neck mass;  Surgeon: Rayray Morton MD;  Location: [facility]     SOFT TISSUE SURGERY      Pilonidal cyst surgery     TUBAL LIGATION  '     WRIST SURGERY Left 2018    TFCC repair, removal of bone chip at [facility] in [place]     Current Outpatient Medications   Medication Sig Dispense Refill     citalopram (CELEXA) 20 MG tablet Take 1 tablet (20 mg) by mouth daily 90 tablet 3     ferrous sulfate 140 (45 Fe) MG TBCR CR tablet Take 140 mg by mouth daily       omeprazole (PRILOSEC) 20 MG DR capsule Take 1 capsule (20 mg) by mouth daily 90 capsule 3     OTC products: None, except as noted above    Allergies   Allergen Reactions     Percodan [Aspirin]      Percolone [Oxycodone]       Latex Allergy: NO    Social History     Tobacco Use     Smoking status: Light Tobacco Smoker     Packs/day: 0.50     Years: 25.00     Pack years: 12.50     Types: Cigarettes     Smokeless tobacco: Never Used   Substance Use Topics     Alcohol use: Yes     Comment: 3-4 drinks per week     History   Drug Use No       REVIEW OF SYSTEMS:   Constitutional, neuro, ENT, endocrine, pulmonary, cardiac, gastrointestinal, genitourinary, musculoskeletal,  integument and psychiatric systems are negative, except as otherwise noted.    EXAM:   /86   Pulse 75   Temp 98.5  F (36.9  C) (Oral)   Wt 135.2 kg (298 lb)   SpO2 96%   BMI 42.15 kg/m      GENERAL APPEARANCE: healthy, alert and no distress     EYES: EOMI, PERRL     HENT: ear canals and TM's normal and nose and mouth without ulcers or lesions     NECK: no adenopathy, no asymmetry, masses, or scars and thyroid normal to palpation     RESP: lungs clear to auscultation - no rales, rhonchi or wheezes     CV: regular rates and rhythm, normal S1 S2, no S3 or S4 and no murmur, click or rub     ABDOMEN:  soft, nontender, no HSM or masses and bowel sounds normal     MS: extremities normal- no gross deformities noted, no evidence of inflammation in joints, FROM in all extremities.     SKIN: no suspicious lesions or rashes     NEURO: Normal strength and tone, sensory exam grossly normal, mentation intact and speech normal     PSYCH: mentation appears normal. and affect normal/bright     LYMPHATICS: No cervical adenopathy    DIAGNOSTICS:         Recent Labs   Lab Test 06/10/19  1209 12/06/18  0810 06/29/16  0904 05/26/15  0930   HGB 13.0 13.9 14.1 13.5   PLT  --   --  234 184   NA  --   --  137 142   POTASSIUM  --   --  4.4 3.8   CR  --   --  0.69 0.78        IMPRESSION:   Reason for surgery/procedure: Abnormal perimenopausal bleeding  Diagnosis/reason for consult: preoperative clearance    The proposed surgical procedure is considered LOW risk.    REVISED CARDIAC RISK INDEX  The patient has the following serious cardiovascular risks for perioperative complications such as (MI, PE, VFib and 3  AV Block):  No serious cardiac risks  INTERPRETATION: 0 risks: Class I (very low risk - 0.4% complication rate)    The patient has the following additional risks for perioperative complications:  No identified additional risks      ICD-10-CM    1. Preop general physical exam Z01.818    2. Abnormal perimenopausal bleeding N92.4         RECOMMENDATIONS:       --Patient is to take all scheduled medications on the day of surgery EXCEPT for modifications listed below.    APPROVAL GIVEN to proceed with proposed procedure, without further diagnostic evaluation       Signed Electronically by: Kristen M. Kehr, PA-C  Chart reviewed, agree with evaluation and recommendations above.  Jennifer Phelan M.D.       Copy of this evaluation report is provided to requesting physician.    [facility] Preop Guidelines    Revised Cardiac Risk Index

## 2019-08-05 NOTE — NURSING NOTE
"Chief Complaint   Patient presents with     Pre-Op Exam       Initial /86   Pulse 75   Temp 98.5  F (36.9  C) (Oral)   Wt 135.2 kg (298 lb)   SpO2 96%   BMI 42.15 kg/m   Estimated body mass index is 42.15 kg/m  as calculated from the following:    Height as of 4/3/19: 1.791 m (5' 10.5\").    Weight as of this encounter: 135.2 kg (298 lb).  Medication Reconciliation: complete    ASAD Cotto MA    "

## 2019-08-16 ENCOUNTER — HOSPITAL ENCOUNTER (EMERGENCY)
Facility: CLINIC | Age: 49
Discharge: HOME | End: 2019-08-17
Attending: EMERGENCY MEDICINE | Admitting: EMERGENCY MEDICINE
Payer: COMMERCIAL

## 2019-08-16 ENCOUNTER — TELEPHONE (OUTPATIENT)
Dept: OBGYN | Facility: CLINIC | Age: 49
End: 2019-08-16

## 2019-08-16 ENCOUNTER — APPOINTMENT (OUTPATIENT)
Dept: ULTRASOUND IMAGING | Facility: CLINIC | Age: 49
End: 2019-08-16
Attending: EMERGENCY MEDICINE
Payer: COMMERCIAL

## 2019-08-16 ENCOUNTER — NURSE TRIAGE (OUTPATIENT)
Dept: NURSING | Facility: CLINIC | Age: 49
End: 2019-08-16

## 2019-08-16 DIAGNOSIS — I82.612 SUPERFICIAL VENOUS THROMBOSIS OF LEFT UPPER EXTREMITY: ICD-10-CM

## 2019-08-16 PROCEDURE — 99284 EMERGENCY DEPT VISIT MOD MDM: CPT | Mod: 25

## 2019-08-16 PROCEDURE — 99284 EMERGENCY DEPT VISIT MOD MDM: CPT | Mod: Z6 | Performed by: EMERGENCY MEDICINE

## 2019-08-16 PROCEDURE — 93971 EXTREMITY STUDY: CPT | Mod: LT

## 2019-08-16 NOTE — ED AVS SNAPSHOT
[facility]  [address] [address]  Phone:  [phone]  Fax:  [phone]                                    Deepa Anderson   MRN: [id number]    Department:  [facility]   Date of Visit:  8/16/2019           After Visit Summary Signature Page    I have received my discharge instructions, and my questions have been answered. I have discussed any challenges I see with this plan with the nurse or doctor.    ..........................................................................................................................................  Patient/Patient Representative Signature      ..........................................................................................................................................  Patient Representative Print Name and Relationship to Patient    ..................................................               ................................................  Date                                   Time    ..........................................................................................................................................  Reviewed by Signature/Title    ...................................................              ..............................................  Date                                               Time          22EPIC Rev 08/18

## 2019-08-16 NOTE — TELEPHONE ENCOUNTER
"S: Calling about L arm swelling.  B: On 8/6 had a laparoscopy total hysterectomy and bilateral salpingectomy at [facility] by Dr. Vyas.  Today where her IV was inserted, L arm near elbow,  there has been swelling that has increased all day long.  \"It feels tight\" The area is red and warm to the touch.  Hurts to extend and bend the elbow.    A: Per care guideline to go to the ED.  R: [place] is in agreement with plan and will go to the ED.  Carolyn Leach RN, [facility]      Reason for Disposition    [1] Red area or streak AND [2] fever    Additional Information    Negative: Severe difficulty breathing (e.g., struggling for each breath, speaks in single words)    Negative: Sounds like a life-threatening emergency to the triager    Negative: [1] MODERATE arm swelling (e.g., puffiness or swollen feeling of entire arm ) and [2] PICC Line    Negative: SEVERE arm swelling (e.g., all of arm looks swollen)    Negative: Difficulty breathing at rest    Negative: [1] MODERATE arm swelling and [2] central venous catheter (central line, internal jugular line)    Negative: Looks like a broken bone or dislocated joint (e.g., crooked or deformed)    Negative: Entire hand is cool or blue in comparison to other side    Negative: [1] Can't use arm or can barely use arm AND [2] new onset    Protocols used: ARM SWELLING AND EDEMA-A-AH      "

## 2019-08-16 NOTE — TELEPHONE ENCOUNTER
Specimen:    Uterus, Cervix, Bilateral Fallopian Tubes                                                  Final Diagnosis   Uterus, cervix and fallopian tubes, hysterectomy and bilateral salpingectomy -  1.  Proliferative endometrium.  2.  Intramural leiomyoma.  3.  Benign cervix and fallopian tubes with no diagnostic alterations.     I called and left patient a detailed message.  She is to call back with any questions.      Nydia Vyas

## 2019-08-17 VITALS
SYSTOLIC BLOOD PRESSURE: 140 MMHG | WEIGHT: 293 LBS | OXYGEN SATURATION: 96 % | DIASTOLIC BLOOD PRESSURE: 66 MMHG | BODY MASS INDEX: 42.44 KG/M2 | TEMPERATURE: 98.2 F | RESPIRATION RATE: 16 BRPM

## 2019-08-17 PROCEDURE — 25000132 ZZH RX MED GY IP 250 OP 250 PS 637: Performed by: EMERGENCY MEDICINE

## 2019-08-17 RX ADMIN — RIVAROXABAN 15 MG: 15 TABLET, FILM COATED ORAL at 00:19

## 2019-08-17 NOTE — ED NOTES
Pt had hysterectomy on Tuesday.  Same day surgery.  Pt had IV in place on LUE.  Pt now has pain and swelling in area.  Redness present.

## 2019-08-17 NOTE — ED PROVIDER NOTES
History     Chief Complaint   Patient presents with     Arm Pain     left arm swollen, hard to toch, tender- had IV site just below area on Tuesday     HPI  Deepa Anderson is a 49 year old female who developed pain and redness near the site of the left forearm IV access for an outpatient total laparoscopic hysterectomy and bilateral salpingectomy 3 days ago.  No distal arm swelling or hand swelling.  No distal arm CMS dysfunction.  No prior history of DVT or PE.  No chest pain, shortness of breath, cough or hemoptysis.  No other acute complaints or concerns.  Family history of DVT: Father with factor V Leiden.  She reports she has tested negative for factor V Leiden.    Allergies:  Allergies   Allergen Reactions     Percodan [Aspirin]      Percolone [Oxycodone]        Problem List:    Patient Active Problem List    Diagnosis Date Noted     Hypertriglyceridemia 05/06/2019     Priority: Medium     Gastroesophageal reflux disease, esophagitis presence not specified 05/06/2019     Priority: Medium     Sedentary lifestyle 05/06/2019     Priority: Medium     Body mass index (BMI) of 40.0-44.9 in adult (H) 04/09/2019     Priority: Medium     Tear of triangular fibrocartilage of left wrist 12/06/2018     Priority: Medium     Tobacco use disorder 12/20/2016     Priority: Medium     S/P laparoscopic cholecystectomy 06/28/2016     Priority: Medium     Gallstones 05/09/2016     Priority: Medium     Abdominal pain, epigastric 05/05/2016     Priority: Medium     Tubular adenoma of colon 10/12/2015     Priority: Medium     Colonoscopy 2015, repeat in 3 years.        Diarrhea 05/26/2015     Priority: Medium     H. pylori infection 02/04/2014     Priority: Medium     CARDIOVASCULAR SCREENING; LDL GOAL LESS THAN 160 08/22/2013     Priority: Medium     Obesity 10/25/2012     Priority: Medium     History of dysplastic nevus 04/26/2012     Priority: Medium     Acne vulgaris 04/26/2012     Priority: Medium        Past Medical History:     Past Medical History:   Diagnosis Date     Acne      Actinic keratosis 2010     History of atypical/dysplastic nevus      Obesity        Past Surgical History:    Past Surgical History:   Procedure Laterality Date     ABDOMEN SURGERY   &      births     BIOPSY  2015    polyp removed     C  DELIVERY ONLY      x2/'02,'06     CHOLECYSTECTOMY  06/15/2016     COLONOSCOPY  2015     COMBINED ESOPHAGOSCOPY, GASTROSCOPY, DUODENOSCOPY (EGD) WITH CO2 INSUFFLATION N/A 2017    Procedure: COMBINED ESOPHAGOSCOPY, GASTROSCOPY, DUODENOSCOPY (EGD) WITH CO2 INSUFFLATION;  EGD-MASS OF NECK/ DELL;  Surgeon: Sohan Renee MD;  Location: [facility]     CYSTECTOMY PILONIDAL       EXCISE MASS NECK Left 2017    Procedure: EXCISE MASS NECK;;  Surgeon: Rayray Morton MD;  Location: [facility]     HEAD & NECK SURGERY  2017    brachial cleft cyst     LAPAROSCOPIC HYSTERECTOMY TOTAL N/A 2019    TLH with BL salpingectomy     LARYNGOSCOPY WITH BIOPSY(IES) N/A 2017    Procedure: LARYNGOSCOPY WITH BIOPSY(IES);  Direct laryngoscopy with biopsy and excision of left neck mass;  Surgeon: Rayray Morton MD;  Location: [facility] OR     SOFT TISSUE SURGERY      Pilonidal cyst surgery     TUBAL LIGATION       WRIST SURGERY Left 2018    TFCC repair, removal of bone chip at [facility] in [place]       Family History:    Family History   Problem Relation Age of Onset     Arthritis Mother      Lipids Mother      Hypertension Mother      Obesity Mother      Thyroid Disease Mother      Diabetes Mother         Diet controlled     Cardiovascular Father      Hypertension Father      Cerebrovascular Disease Father      Hypertension Brother      Hypertension Brother      Cancer No family hx of      Glaucoma No family hx of      Macular Degeneration No family hx of      Diabetes No family hx of        Social History:  Marital Status:   [2]  Social History     Tobacco Use     Smoking  status: Light Tobacco Smoker     Packs/day: 0.50     Years: 25.00     Pack years: 12.50     Types: Cigarettes     Smokeless tobacco: Never Used   Substance Use Topics     Alcohol use: Yes     Comment: 3-4 drinks per week     Drug use: No        Medications:      Rivaroxaban (XARELTO STARTER PACK) 15 & 20 MG TBPK   citalopram (CELEXA) 20 MG tablet   ferrous sulfate 140 (45 Fe) MG TBCR CR tablet   omeprazole (PRILOSEC) 20 MG DR capsule       Review of Systems  As mentioned above in the HPI, otherwise focused 10 point ROS was reviewed and was negative.  Constitutional: no fever or chills.  Ears, Nose,Throat: no sore throat or difficulty swallowing.  Respiratory: no cough or shortness of breath.  Cardiovascular: no chest pain or leg swelling.  Gastrointestinal: no nausea, vomiting or abdominal pain or blood in the stools.  Genitourinary: no acute difficulty urinating or UTI symptoms.  Musculoskeletal: no joint pain or swelling.  Skin: no rash or other acute skin changes.  Neurologic: no focal weakness or numbness.  Hematologic: no unusual bleeding or bruising.    Physical Exam   BP: (!) 145/96  Heart Rate: 70  Temp: 98.6  F (37  C)  Resp: 18  Weight: 136.1 kg (300 lb)  SpO2: 96 %      Physical Exam   Constitutional: She is oriented to person, place, and time. She appears well-developed and well-nourished. No distress.   HENT:   Head: Normocephalic and atraumatic.   Eyes: Conjunctivae and EOM are normal. No scleral icterus.   Neck: Normal range of motion. Neck supple. No tracheal deviation present.   Cardiovascular: Normal rate, regular rhythm and normal heart sounds. Exam reveals no gallop and no friction rub.   No murmur heard.  Pulmonary/Chest: Effort normal and breath sounds normal. No respiratory distress. She has no wheezes. She has no rales.   Abdominal: Soft. She exhibits no distension. There is no tenderness.   Musculoskeletal: Normal range of motion. She exhibits tenderness ( Palpable medial left mid arm cord  with erythema). She exhibits no edema.        Arms:  Neurological: She is alert and oriented to person, place, and time. She has normal strength. No sensory deficit. She exhibits normal muscle tone. Coordination normal.   Skin: Skin is warm and dry. No rash noted. She is not diaphoretic. No pallor.   Psychiatric: She has a normal mood and affect. Her behavior is normal.   Nursing note and vitals reviewed.      ED Course        Procedures                   Results for orders placed or performed during the hospital encounter of 08/16/19 (from the past 24 hour(s))   US Upper Extremity Venous Duplex Left    Narrative    US UPPER EXTREMITY VENOUS DUPLEX LEFT 8/16/2019 10:43 PM     HISTORY: Redness, swelling, warmth, pain at IV site from Tuesday.    TECHNIQUE: Venous Doppler US of the upper extremity.?Color flow and  spectral Doppler with waveform analysis performed.    COMPARISON: None.    FINDINGS: Ultrasound of the left upper extremity demonstrates no deep  vein thrombus in the left internal jugular, subclavian or brachial  veins. There is thrombus within the left basilic vein which is a  superficial vein, though often treated as deep vein thrombus. No  thrombus seen in the cephalic vein or visualized segments of the  radial or ulnar veins. There is also thrombus within a radial vein   which extends up to the junction of the brachial/axillary  vein.      Impression    IMPRESSION:  1. Study is positive for left basilic vein thrombus which is a  superficial vein, though often treated as deep vein thrombosis.  2. There is also a long segment of thrombus within a left radial  perforating vein extending from the level of the radial vein in the  upper forearm to just below the junction of the axillary and brachial  veins.    YANIRA WINSTON MD       Medications   rivaroxaban ANTICOAGULANT (XARELTO) tablet 15 mg (15 mg Oral Given 8/17/19 0019)     I discussed the results of the ultrasound evaluation with the  patient and her significant other.  We discussed the risks and benefits of initiating anticoagulation therapy and she wished to proceed with this because of her concern about propagation of the clot and family history of factor V Leiden and DVT, although she reports testing negative for this in the past.  We discussed the risks and benefits of Xarelto/Eliquis versus Lovenox/Coumadin and she we will start Xarelto therapy.  She and her family understand the risk of postoperative bleeding from anticoagulation therapy and are comfortable with this and will monitor for signs and symptoms of this very carefully.    Reviewed initiation of anticoagulation therapy 3 days postoperatively with the OB/GYN physician on-call, Dr. Cantor.  Risk of postoperative bleeding is felt to be low.    Assessments & Plan (with Medical Decision Making)   49-year-old female with outpatient total laparoscopic hysterectomy and bilateral salpingectomy 3 days ago developed superficial thrombophlebitis proximal to the left forearm IV access site from 3 days ago.  She has left basilic vein thrombus, which is often treated as a DVT, and a long segment of thrombus within a left radial perforating vein extending from the level of the radial vein in the upper forearm to just below the junction of the axillary and brachial veins.  We discussed risks of thrombus propagation and DVT, and PE, and risks and benefits benefits of anticoagulation therapy and she elected anticoagulation therapy.  She denied other risks of anticoagulation therapy.  She wished to defer anticoagulation with Lovenox/Coumadin and defer INR monitoring.  She will be started on Xarelto and will monitor carefully for any signs or symptoms of bleeding, or PE.  Hematology clinic referral was made for her and she will recheck in primary care clinic this coming week.    I have reviewed the nursing notes.    I have reviewed the findings, diagnosis, plan and need for follow up with the  patient.    Discharge Medication List as of 8/17/2019 12:37 AM      Xarelto 15 mg p.o. twice daily for 21 days, then 20 mg daily until further direction by hematology or primary care provider. #30-day supply.  No refills.    Final diagnoses:   Superficial venous thrombosis of left upper extremity       8/16/2019   [facility] EMERGENCY DEPARTMENT     Danie Gil MD  08/17/19 0043

## 2019-08-19 NOTE — TELEPHONE ENCOUNTER
ONCOLOGY INTAKE: Records Information      APPT INFORMATION: 8/26/19 - Adrian BLANDON  Referring provider:  Danie Gil  Referring provider s clinic:  [facility]  Reason for visit/diagnosis:  Superficial venous thrombosis of left upper extremity   Has patient been notified of appointment date and time?: Yes    RECORDS INFORMATION:  Were the records received with the referral (via [employer])? Internal referral    Has patient been seen for any external appt for this diagnosis? No    If yes, where? NA    Has patient had any imaging or procedures outside of [facility] for this condition? No      If Yes, where? NA    ADDITIONAL INFORMATION:  Pt scheduled for 8/26/19 at 215pm per Dr Adrian howe

## 2019-08-20 NOTE — TELEPHONE ENCOUNTER
RECORDS STATUS - ALL OTHER DIAGNOSIS      RECORDS RECEIVED FROM: Epic/   DATE RECEIVED: 8/26/2019    NOTES STATUS DETAILS   OFFICE NOTE from referring provider Van Gil   OFFICE NOTE from medical oncologist N/A    DISCHARGE SUMMARY from hospital N/A    DISCHARGE REPORT from the ER Complete  [facility] 8/19/2019 8/16/2019    OPERATIVE REPORT N/A    MEDICATION LIST Complete  [facility]   CLINICAL TRIAL TREATMENTS TO DATE N/A    LABS     PATHOLOGY REPORTS     ANYTHING RELATED TO DIAGNOSIS Complete  Recent Labs in EPIC   GENONOMIC TESTING     TYPE:     IMAGING (NEED IMAGES & REPORT)     CT SCANS     MRI     MAMMO     ULTRASOUND Complete  PACS   PET

## 2019-08-26 ENCOUNTER — HOSPITAL ENCOUNTER (OUTPATIENT)
Dept: LAB | Facility: CLINIC | Age: 49
Discharge: HOME | End: 2019-08-26
Attending: INTERNAL MEDICINE | Admitting: INTERNAL MEDICINE
Payer: COMMERCIAL

## 2019-08-26 ENCOUNTER — ONCOLOGY VISIT (OUTPATIENT)
Dept: ONCOLOGY | Facility: CLINIC | Age: 49
End: 2019-08-26
Attending: EMERGENCY MEDICINE
Payer: COMMERCIAL

## 2019-08-26 ENCOUNTER — PRE VISIT (OUTPATIENT)
Dept: ONCOLOGY | Facility: CLINIC | Age: 49
End: 2019-08-26

## 2019-08-26 VITALS
HEART RATE: 67 BPM | RESPIRATION RATE: 16 BRPM | SYSTOLIC BLOOD PRESSURE: 124 MMHG | TEMPERATURE: 99.5 F | WEIGHT: 293 LBS | BODY MASS INDEX: 41.02 KG/M2 | OXYGEN SATURATION: 97 % | HEIGHT: 71 IN | DIASTOLIC BLOOD PRESSURE: 59 MMHG

## 2019-08-26 DIAGNOSIS — Z83.2 FAMILY HISTORY OF HEMOPHILIA: ICD-10-CM

## 2019-08-26 DIAGNOSIS — I80.8 SUPERFICIAL THROMBOPHLEBITIS OF LEFT UPPER EXTREMITY: Primary | ICD-10-CM

## 2019-08-26 DIAGNOSIS — F17.200 SMOKER: ICD-10-CM

## 2019-08-26 DIAGNOSIS — F17.210 CIGARETTE NICOTINE DEPENDENCE WITHOUT COMPLICATION: ICD-10-CM

## 2019-08-26 PROCEDURE — 81241 F5 GENE: CPT | Performed by: INTERNAL MEDICINE

## 2019-08-26 PROCEDURE — G0463 HOSPITAL OUTPT CLINIC VISIT: HCPCS

## 2019-08-26 PROCEDURE — 99204 OFFICE O/P NEW MOD 45 MIN: CPT | Performed by: INTERNAL MEDICINE

## 2019-08-26 PROCEDURE — 36415 COLL VENOUS BLD VENIPUNCTURE: CPT | Performed by: INTERNAL MEDICINE

## 2019-08-26 ASSESSMENT — PAIN SCALES - GENERAL: PAINLEVEL: NO PAIN (0)

## 2019-08-26 ASSESSMENT — MIFFLIN-ST. JEOR: SCORE: 2063.91

## 2019-08-26 NOTE — PATIENT INSTRUCTIONS
Dr. Campbell would like you to have lab work today and we will call you with results.      We would like to see you back in as needed for a follow up appointment.     When you are in need of a refill, please call your pharmacy and they will send us a request.      Copy of appointments, and after visit summary (AVS) given to patient.      If you have any questions please call Debbie Cortes RN, BSN Oncology Hematology  [facility] [phone]. For questions after business hours, or on holidays/weekends, please call our after hours Nurse Triage line [phone]. Thank you.     Lab today  F/u prn.

## 2019-08-26 NOTE — PROGRESS NOTES
"Oncology Rooming Note    August 26, 2019 2:29 PM   Deepa Anderson is a 49 year old female who presents for:    Chief Complaint   Patient presents with     Hematology     New Patient - Superficial venous thrombosis of left upper extremity     Initial Vitals: /59 (BP Location: Right arm, Patient Position: Sitting, Cuff Size: Adult Large)   Pulse 67   Temp 99.5  F (37.5  C) (Tympanic)   Resp 16   Ht 1.797 m (5' 10.75\")   Wt 133.7 kg (294 lb 11.2 oz)   SpO2 97%   BMI 41.39 kg/m   Estimated body mass index is 41.39 kg/m  as calculated from the following:    Height as of this encounter: 1.797 m (5' 10.75\").    Weight as of this encounter: 133.7 kg (294 lb 11.2 oz). Body surface area is 2.58 meters squared.  No Pain (0) Comment: Data Unavailable   No LMP recorded. (Menstrual status: Tubal ).  Allergies reviewed: Yes  Medications reviewed: Yes    Medications: Medication refills not needed today.  Pharmacy name entered into [employer]: [id number] - [facility] [address]    Clinical concerns:  New Patient - Post Hysterectomy,  Superficial venous thrombosis of left upper extremity. Has had factor V here at [facility].   Father & Brothers are + Factor V.       Gabriella Greco, [facility]              "

## 2019-08-26 NOTE — PROGRESS NOTES
NEW PATIENT HEMATOLOGY CONSULT      REQUESTING PROVIDER: Danie Kumar ED provider       REASON FOR CONSULTATION:        INTERVAL HISTORY: N/A      HISTORY OF HEMATOLLOGY ILLNESS:    49 year old female who developed pain and redness near the site of the left forearm IV access for an outpatient total laparoscopic hysterectomy and bilateral salpingectomy 8/2019.  Was evaluated emergency room, US found to have left basilic vein thrombus which is a superficial vein.   She is started on treatment dose of Xarelto.        PAST MEDICAL HISTORY  Hyperlipidemia  Tobacco use  Reflux disease, gallstone, is post cholecystectomy, history of H. pylori infection  Obesity    MEDICATIONS/ALLERY:  Reviewed in Epic system.        SOCIAL HISTORY:  She is a smoker, in the process of quitting.   Deny ETOH use.         FAMILY HISTORY:   That is positive factor V Leiden mutation who had clot.        REVIEW OF SYSTEMS:   GENERAL: pt is in usual state of health.  No B symptoms  NEURO:   No headache, double vision, or focal weakness.  No neuropathy.   SKIN:  No chronic skin rash or skin infection.   CARDIOVASCULAR:  No High blood pressure, + hyperlipidemia. NO MARTINES  PULMONARY:  No shortness of breath, no pleurisy, no cough, no hemoptysis. Tobacco use  GI:  Reflux disease, gallstone, is post cholecystectomy, history of H. pylori infection  :  No urgency, frequency.  No recurrent urinary tract infection.  No kidney problems.   RHEUMATOLOGY/MUSCULOSKELETAL SYSTEM:  no arthritic pain, or muscle pain. No muscle ache.   ENDOCRINE:  No history of diabetes or thyroid problem.  No complaints of hot flashes. obesity  HEMATOLOGY:   Catheter related superficial thrombophlebitis August 2019.  Oncology: no hx of cancers  IMMUNOLOGY:  No recurrent fever or chills episode.  No recurrent infectious episode.   PSYCHIATRY:  No anxiety or depression.          PHYSICAL EXAMINATION:   VITAL SIGNS: Blood pressure 124/59, pulse 67, temperature 99.5  F (37.5  " C), temperature source Tympanic, resp. rate 16, height 1.797 m (5' 10.75\"), weight 133.7 kg (294 lb 11.2 oz), SpO2 97 %, not currently breastfeeding.    ECO    GENERAL APPEARANCE:  looks like her stated age, very pleasant, not in acute distress.     ECOG    ENT, MOUTH: Pupils are equally reactive to light.  Sclerae are anicteric.  Moist oral mucosa and lips with lesion or ulcer.   Negative pharynx.  No oral thrush.   NECK:  Supple.  No jugular venous distention.  Thyroid is not palpable.   LYMPH NODES:  Superficial lymphadenopathy is not appreciable in the bilateral cervical, supraclavicular, axillary or inguinal areas.   CARDIOVASCULAR:  S1, S2 regular with no murmurs or gallops.  No carotid or abdominal bruits.   PULMONARY:  Lungs are clear to auscultation and percussion bilaterally.  There is no wheezing or rhonchi.   GASTROINTESTINAL:  Abdomen is soft, nontender.  No hepatosplenomegaly.  No signs of ascites.  No mass appreciable.   MUSCULOSKELETAL/EXTREMITIES:  No edema.  No cyanotic changes.  No signs of joint deformity.  No lymphedema.  Left upper arm exam reveals no evidence of cord,  no erythema, no edema nEUROLOGIC:  Cranial nerves II-XII are grossly intact.  Sensation intact.  Muscle strength and muscle tone symmetrical, 5/5 throughout.   BACK  No spinal or paraspinal tenderness.  No CVA tenderness.   SKIN:  No petechiae.  No rash.  No signs of cellulitis.   PSYCHIATRIC: Oriented to time, person, and places. Normal mood and affect. Good memory. Proper insight and judgement.       CURRENT LAB DATA REVIEWED  CBC 2019 is fine        CURRENT IMAGING REVIEWED  US left upper limb 2019 - left basilic vein thrombus which is a  superficial vein, though often treated as deep vein thrombosis.       OLD DATA REVIEWED TODAY WITH SUMMARY:   No baseline DVT imaging study identified in our system.        ASSESSMENT AND PLAN:    1.  Catheter related left basilic vein which is a superficial vein thrombosis " August 2019.  This is a provoked event.  Recommend treatment as a DVT because of the anatomic location 3 months.    After that she can discontinue Xarelto and do baby aspirin because of her family history of factor V Leiden mutation.    We discussed the side effect associated with Xarelto especially the bleeding complication.  She is advised to communicate with us if any into side effect.    All Qs are answered to pt's satisfaction.  She is willing to proceed with the above recommendation.    2.  Active smoker.  Spent 5 minutes counseling the patient is on pros and cons and benefits of quit quitting tobacco.  This is a risk factor for clotting.  We identified the barries, patient is interested and ready in quitting.  We discussed available resources in assisting this process.  Patient is willing to try cutting down gradually.  She did not tolerating prescription graded smoking cessation medication intervention  Recommend patient to follow-up for any additional help.     3.  History of factor V Leiden mutation.  Recommend her to have the blood test done today.  She made informed consent to proceed with the test.

## 2019-08-26 NOTE — LETTER
"    8/26/2019         RE: Deepa Anderson  [address] [facility] [address]        Dear Colleague,    Thank you for referring your patient, Deepa Anderson, to the [facility]. Please see a copy of my visit note below.    Oncology Rooming Note    August 26, 2019 2:29 PM   Deepa Anderson is a 49 year old female who presents for:    Chief Complaint   Patient presents with     Hematology     New Patient - Superficial venous thrombosis of left upper extremity     Initial Vitals: /59 (BP Location: Right arm, Patient Position: Sitting, Cuff Size: Adult Large)   Pulse 67   Temp 99.5  F (37.5  C) (Tympanic)   Resp 16   Ht 1.797 m (5' 10.75\")   Wt 133.7 kg (294 lb 11.2 oz)   SpO2 97%   BMI 41.39 kg/m    Estimated body mass index is 41.39 kg/m  as calculated from the following:    Height as of this encounter: 1.797 m (5' 10.75\").    Weight as of this encounter: 133.7 kg (294 lb 11.2 oz). Body surface area is 2.58 meters squared.  No Pain (0) Comment: Data Unavailable   No LMP recorded. (Menstrual status: Tubal ).  Allergies reviewed: Yes  Medications reviewed: Yes    Medications: Medication refills not needed today.  Pharmacy name entered into GFRANQ: [id number] [address]    Clinical concerns:  New Patient - Post Hysterectomy,  Superficial venous thrombosis of left upper extremity. Has had factor V here at [place].   Father & Brothers are + Factor V.       Gabriella Greco CMA                NEW PATIENT HEMATOLOGY CONSULT      REQUESTING PROVIDER: Danie Kumar ED provider       REASON FOR CONSULTATION:        INTERVAL HISTORY: N/A      HISTORY OF HEMATOLLOGY ILLNESS:    49 year old female who developed pain and redness near the site of the left forearm IV access for an outpatient total laparoscopic hysterectomy and bilateral salpingectomy 8/2019.  Was evaluated emergency room, US found to have left basilic vein " "thrombus which is a superficial vein.   She is started on treatment dose of Xarelto.        PAST MEDICAL HISTORY  Hyperlipidemia  Tobacco use  Reflux disease, gallstone, is post cholecystectomy, history of H. pylori infection  Obesity    MEDICATIONS/ALLERY:  Reviewed in Epic system.        SOCIAL HISTORY:  She is a smoker, in the process of quitting.   Deny ETOH use.         FAMILY HISTORY:   That is positive factor V Leiden mutation who had clot.        REVIEW OF SYSTEMS:   GENERAL: pt is in usual state of health.  No B symptoms  NEURO:   No headache, double vision, or focal weakness.  No neuropathy.   SKIN:  No chronic skin rash or skin infection.   CARDIOVASCULAR:  No High blood pressure, + hyperlipidemia. NO MARTINES  PULMONARY:  No shortness of breath, no pleurisy, no cough, no hemoptysis. Tobacco use  GI:   Reflux disease, gallstone, is post cholecystectomy, history of H. pylori infection  :  No urgency, frequency.  No recurrent urinary tract infection.  No kidney problems.   RHEUMATOLOGY/MUSCULOSKELETAL SYSTEM:  no arthritic pain, or muscle pain. No muscle ache.   ENDOCRINE:  No history of diabetes or thyroid problem.  No complaints of hot flashes. obesity  HEMATOLOGY:    Catheter related superficial thrombophlebitis 2019.  Oncology: no hx of cancers  IMMUNOLOGY:  No recurrent fever or chills episode.  No recurrent infectious episode.   PSYCHIATRY:  No anxiety or depression.          PHYSICAL EXAMINATION:   VITAL SIGNS: Blood pressure 124/59, pulse 67, temperature 99.5  F (37.5  C), temperature source Tympanic, resp. rate 16, height 1.797 m (5' 10.75\"), weight 133.7 kg (294 lb 11.2 oz), SpO2 97 %, not currently breastfeeding.    ECO    GENERAL APPEARANCE:  looks like her stated age, very pleasant, not in acute distress.     ECOG    ENT, MOUTH: Pupils are equally reactive to light.  Sclerae are anicteric.  Moist oral mucosa and lips with lesion or ulcer.   Negative pharynx.  No oral thrush.   NECK:  " Supple.  No jugular venous distention.  Thyroid is not palpable.   LYMPH NODES:  Superficial lymphadenopathy is not appreciable in the bilateral cervical, supraclavicular, axillary or inguinal areas.   CARDIOVASCULAR:  S1, S2 regular with no murmurs or gallops.  No carotid or abdominal bruits.   PULMONARY:  Lungs are clear to auscultation and percussion bilaterally.  There is no wheezing or rhonchi.   GASTROINTESTINAL:  Abdomen is soft, nontender.  No hepatosplenomegaly.  No signs of ascites.  No mass appreciable.   MUSCULOSKELETAL/EXTREMITIES:  No edema.  No cyanotic changes.  No signs of joint deformity.  No lymphedema.  Left upper arm exam reveals no evidence of cord,  no erythema, no edema nEUROLOGIC:  Cranial nerves II-XII are grossly intact.  Sensation intact.  Muscle strength and muscle tone symmetrical, 5/5 throughout.   BACK  No spinal or paraspinal tenderness.  No CVA tenderness.   SKIN:  No petechiae.  No rash.  No signs of cellulitis.   PSYCHIATRIC: Oriented to time, person, and places. Normal mood and affect. Good memory. Proper insight and judgement.       CURRENT LAB DATA REVIEWED  CBC August 2019 is fine        CURRENT IMAGING REVIEWED  US left upper limb 8/2019 - left basilic vein thrombus which is a  superficial vein, though often treated as deep vein thrombosis.       OLD DATA REVIEWED TODAY WITH SUMMARY:   No baseline DVT imaging study identified in our system.        ASSESSMENT AND PLAN:    1.  Catheter related left basilic vein which is a superficial vein thrombosis August 2019.  This is a provoked event.  Recommend treatment as a DVT because of the anatomic location 3 months.    After that she can discontinue Xarelto and do baby aspirin because of her family history of factor V Leiden mutation.    We discussed the side effect associated with Xarelto especially the bleeding complication.  She is advised to communicate with us if any into side effect.    All Qs are answered to pt's satisfaction.   She is willing to proceed with the above recommendation.    2.  Active smoker.  Spent 5 minutes counseling the patient is on pros and cons and benefits of quit quitting tobacco.  This is a risk factor for clotting.  We identified the barries, patient is interested and ready in quitting.  We discussed available resources in assisting this process.  Patient is willing to try cutting down gradually.  She did not tolerating prescription graded smoking cessation medication intervention  Recommend patient to follow-up for any additional help.     3.  History of factor V Leiden mutation.  Recommend her to have the blood test done today.  She made informed consent to proceed with the test.       Again, thank you for allowing me to participate in the care of your patient.        Sincerely,        Joanna Campbell MD, MD

## 2019-08-29 LAB — COPATH REPORT: NORMAL

## 2019-08-30 ENCOUNTER — OFFICE VISIT (OUTPATIENT)
Dept: OBGYN | Facility: OTHER | Age: 49
End: 2019-08-30
Payer: COMMERCIAL

## 2019-08-30 VITALS
SYSTOLIC BLOOD PRESSURE: 110 MMHG | BODY MASS INDEX: 41.15 KG/M2 | WEIGHT: 293 LBS | HEART RATE: 62 BPM | DIASTOLIC BLOOD PRESSURE: 80 MMHG

## 2019-08-30 DIAGNOSIS — I82.602 THROMBOSIS OF LEFT UPPER EXTREMITY: ICD-10-CM

## 2019-08-30 DIAGNOSIS — Z90.710 S/P HYSTERECTOMY: Primary | ICD-10-CM

## 2019-08-30 PROCEDURE — 99024 POSTOP FOLLOW-UP VISIT: CPT | Performed by: OBSTETRICS & GYNECOLOGY

## 2019-08-30 RX ORDER — LOPERAMIDE HCL 2 MG
2 CAPSULE ORAL
COMMUNITY

## 2019-08-30 NOTE — LETTER
August 30, 2019      Deepa Anderson  [address] [facility] [address]        To Whom it May Concern,         Deepa Anderson was seen and treated at our clinic.  Please excuse her from work until 9/16 due to medical reasons.  She should be on light duty from then until 9/30 at which time she can return to work with no restrictions.  Feel free to contact me with any questions or concerns.        Sincerely,        Nydia Vyas, DO

## 2019-08-30 NOTE — RESULT ENCOUNTER NOTE
Message left for patient to return call to clinic to review results and Dr. Campbell's recommendations. Direct line provided. Lucia Donato RN, BSN, OCN

## 2019-08-30 NOTE — PATIENT INSTRUCTIONS
If you have any questions regarding your visit, Please contact your care team.    Women s Health CLINIC HOURS TELEPHONE NUMBER   Nydia Vyas M.D.    Macy Wu -         Monday-[facility]  7:00 am - 5 pm Monday's Tuesday- [facility]  8:00am- 5 pm  Wednesday- Off  Thursday- Offf Friday-Am Bob, and [facility]  Bob 8:00-11:30 AM  [place] 1:00-5:00 PM [facility]  [address] [place] [address]  [phone] ask for [facility]    Imaging [phone]    [facility]  [address] [place], [place] [address] [phone]  Imaging [phone]    [facility]  [address] [place], [place] [id number] [phone]  Imaging Scheduling - [phone]     Urgent Care locations:    [facility] Saturday and Sunday   9 am - 5 pm    Monday-Friday   12 pm - 8 pm  Saturday and Sunday   9 am - 5 pm   [phone]       If you need a medication refill, please contact your pharmacy. Please allow 3 business days for your refill to be completed.  As always, Thank you for trusting us with your healthcare needs!

## 2019-08-30 NOTE — PROGRESS NOTES
Subjective  49 year old non-pregnant female presents today as a post-op from a [facility] with bilateral salpingectomy on 19.  Patient states she is doing well.  No pain.  No vaginal bleeding.  No problems urinating.  Normal bowel movements.  No fever or chills.  Nothing per vagina.  3 days after surgery patient developed redness, swelling, pain, and heat in her left arm.  She was seen at [place] ED that night and was told she had a blood clot in her arm and was started on Xarelto.  She was then seen at [facility] ED 6 days after surgery due to developing a lump in that arm.  She saw hematology recently and they want her to continue the Xarelto for 3 months.  Blood work was done and doesn't not show a clotting history.  No leg pain or calf pain.  No sob.  We reviewed her pictures and pathology from surgery and all questions answered.        ROS: 10 point ROS neg other than the symptoms noted above in the HPI.  Past Medical History:   Diagnosis Date     Acne      Actinic keratosis 2010    porokeratosis R hand     History of atypical/dysplastic nevus     back and R thigh.     Obesity      Past Surgical History:   Procedure Laterality Date     ABDOMEN SURGERY   &      births     BIOPSY  2015    polyp removed     C  DELIVERY ONLY      x2/'02,'06     CHOLECYSTECTOMY  06/15/2016     COLONOSCOPY  2015     COMBINED ESOPHAGOSCOPY, GASTROSCOPY, DUODENOSCOPY (EGD) WITH CO2 INSUFFLATION N/A 2017    Procedure: COMBINED ESOPHAGOSCOPY, GASTROSCOPY, DUODENOSCOPY (EGD) WITH CO2 INSUFFLATION;  EGD-MASS OF NECK/ DELL;  Surgeon: Sohan Renee MD;  Location:  OR     CYSTECTOMY PILONIDAL  '05     EXCISE MASS NECK Left 2017    Procedure: EXCISE MASS NECK;;  Surgeon: Rayray Morton MD;  Location:  OR     HEAD & NECK SURGERY  2017    brachial cleft cyst     LAPAROSCOPIC HYSTERECTOMY TOTAL N/A 2019    [facility] with BL salpingectomy     LARYNGOSCOPY WITH BIOPSY(IES) N/A  6/29/2017    Procedure: LARYNGOSCOPY WITH BIOPSY(IES);  Direct laryngoscopy with biopsy and excision of left neck mass;  Surgeon: Rayray Morton MD;  Location: [facility] OR     SOFT TISSUE SURGERY  2005    Pilonidal cyst surgery     TUBAL LIGATION  '06     WRIST SURGERY Left 12/14/2018    TFCC repair, removal of bone chip at [facility] in [place]     Family History   Problem Relation Age of Onset     Arthritis Mother      Lipids Mother      Hypertension Mother      Obesity Mother      Thyroid Disease Mother      Diabetes Mother         Diet controlled     Cardiovascular Father      Hypertension Father      Cerebrovascular Disease Father      Hypertension Brother      Hypertension Brother      Cancer No family hx of      Glaucoma No family hx of      Macular Degeneration No family hx of      Diabetes No family hx of      Social History     Tobacco Use     Smoking status: Light Tobacco Smoker     Packs/day: 0.50     Years: 25.00     Pack years: 12.50     Types: Cigarettes     Smokeless tobacco: Current User   Substance Use Topics     Alcohol use: Yes     Comment: 3-4 drinks per week         Objective  Vitals: /80   Pulse 62   Wt 132.9 kg (293 lb)   LMP 05/06/2019 (Approximate)   BMI 41.15 kg/m    BMI= Body mass index is 41.15 kg/m .      Abd=soft, Nontender/nondistended, incisions=healed well      Pathology=8/16/19:  Specimen:    Uterus, Cervix, Bilateral Fallopian Tubes                                                   Final Diagnosis   Uterus, cervix and fallopian tubes, hysterectomy and bilateral salpingectomy -  1.  Proliferative endometrium.  2.  Intramural leiomyoma.  3.  Benign cervix and fallopian tubes with no diagnostic alterations.       Assessment  1.)  S/p TLH with bilateral salpingectomy on 8/13/19=benign pathology  2.)  Left basilic vein clot      Plan  1.)  Follow-up in 4 weeks  2.)  Continue Bert Vyas DO

## 2019-09-03 NOTE — RESULT ENCOUNTER NOTE
Called and reviewed results with the patient per Dr. Campbell. Patient had no further questions or concerns at this time and also verbalized understanding. Direct line provided if any further questions/concerns arise.

## 2019-09-07 ENCOUNTER — MYC MEDICAL ADVICE (OUTPATIENT)
Dept: OBGYN | Facility: OTHER | Age: 49
End: 2019-09-07

## 2019-09-11 ENCOUNTER — MYC MEDICAL ADVICE (OUTPATIENT)
Dept: OBGYN | Facility: OTHER | Age: 49
End: 2019-09-11

## 2019-09-11 NOTE — TELEPHONE ENCOUNTER
Dr. Vyas will be in clinic Tomorrow afternoon.  Please print and review with Dr. Vyas.  It is a return to work form.    Reina De La Paz, [facility]  September 11, 2019

## 2019-09-12 NOTE — TELEPHONE ENCOUNTER
Form was attached to last [employer] message, see encounter from 9/7/19. RN has printed paperwork and placed in Dr. Vyas box for review and completion.     Cassie Trevino RN on 9/12/2019 at 10:02 AM

## 2019-09-12 NOTE — TELEPHONE ENCOUNTER
Form was attached to last [employer] message, see encounter from 9/7/19. RN has printed paperwork and placed in Dr. Vyas box for review and completion.     Cassie Trevino RN on 9/12/2019 at 10:03 AM

## 2019-09-30 ENCOUNTER — OFFICE VISIT (OUTPATIENT)
Dept: OBGYN | Facility: OTHER | Age: 49
End: 2019-09-30
Payer: COMMERCIAL

## 2019-09-30 VITALS
SYSTOLIC BLOOD PRESSURE: 120 MMHG | DIASTOLIC BLOOD PRESSURE: 68 MMHG | HEART RATE: 78 BPM | BODY MASS INDEX: 41.65 KG/M2 | WEIGHT: 293 LBS

## 2019-09-30 DIAGNOSIS — Z90.710 S/P HYSTERECTOMY: Primary | ICD-10-CM

## 2019-09-30 DIAGNOSIS — I82.619 BASILIC VEIN THROMBOSIS: ICD-10-CM

## 2019-09-30 PROCEDURE — 99024 POSTOP FOLLOW-UP VISIT: CPT | Performed by: OBSTETRICS & GYNECOLOGY

## 2019-09-30 NOTE — PROGRESS NOTES
Subjective  49 year old non-pregnant female presents today as a post-op from a [facility] with bilateral salpingectomy on 19.  Patient states she is doing well.  No pain.  No vaginal bleeding.  No problems urinating.  Normal bowel movements.  No fever or chills.  Nothing per vagina.  She was seen at [place] ED after surgery and was told she had a blood clot in her arm and was started on Xarelto.  She was then seen at [facility] 6 days after surgery due to developing a lump in that arm.  She saw hematology recently and they want her to continue the Xarelto for 3 months.  Blood work was done and doesn't not show a clotting history.  No leg pain or calf pain.  No sob.        ROS: 10 point ROS neg other than the symptoms noted above in the HPI.  Past Medical History:   Diagnosis Date     Acne      Actinic keratosis 2010    porokeratosis R hand     History of atypical/dysplastic nevus     back and R thigh.     Obesity      Past Surgical History:   Procedure Laterality Date     ABDOMEN SURGERY   &      births     BIOPSY  2015    polyp removed     C  DELIVERY ONLY      x2/'02,'06     CHOLECYSTECTOMY  06/15/2016     COLONOSCOPY  2015     COMBINED ESOPHAGOSCOPY, GASTROSCOPY, DUODENOSCOPY (EGD) WITH CO2 INSUFFLATION N/A 2017    Procedure: COMBINED ESOPHAGOSCOPY, GASTROSCOPY, DUODENOSCOPY (EGD) WITH CO2 INSUFFLATION;  EGD-MASS OF NECK/ DELL;  Surgeon: Sohan Renee MD;  Location:  OR     CYSTECTOMY PILONIDAL  '05     EXCISE MASS NECK Left 2017    Procedure: EXCISE MASS NECK;;  Surgeon: Rayray Morton MD;  Location:  OR     HEAD & NECK SURGERY  2017    brachial cleft cyst     LAPAROSCOPIC HYSTERECTOMY TOTAL N/A 2019    [facility] with BL salpingectomy     LARYNGOSCOPY WITH BIOPSY(IES) N/A 2017    Procedure: LARYNGOSCOPY WITH BIOPSY(IES);  Direct laryngoscopy with biopsy and excision of left neck mass;  Surgeon: Rayray Morton MD;  Location:  OR      SOFT TISSUE SURGERY  2005    Pilonidal cyst surgery     TUBAL LIGATION  '06     WRIST SURGERY Left 12/14/2018    TFCC repair, removal of bone chip at [facility] in [place]     Family History   Problem Relation Age of Onset     Arthritis Mother      Lipids Mother      Hypertension Mother      Obesity Mother      Thyroid Disease Mother      Diabetes Mother         Diet controlled     Cardiovascular Father      Hypertension Father      Cerebrovascular Disease Father      Hypertension Brother      Hypertension Brother      Cancer No family hx of      Glaucoma No family hx of      Macular Degeneration No family hx of      Diabetes No family hx of      Social History     Tobacco Use     Smoking status: Light Tobacco Smoker     Packs/day: 0.50     Years: 25.00     Pack years: 12.50     Types: Cigarettes     Smokeless tobacco: Current User   Substance Use Topics     Alcohol use: Yes     Comment: 3-4 drinks per week         Objective  Vitals: /68   Pulse 78   Wt 134.5 kg (296 lb 8 oz)   LMP 05/06/2019 (Approximate)   BMI 41.65 kg/m    BMI= Body mass index is 41.65 kg/m .    Breast:  Benign exam, no masses palpated.  No skin changes, no axillary lymphadenopathy, no nipple discharge.  Axilla feel completely normal, no lymph node enlargement and non-tender.  Heart=RRR, no murmurs  Thyroid=normal, no masses, no TTP  Lungs=Clear to ascultation bilateral, non-labored breathing  Abd=soft, Nontender/nondistended, +bowel sounds x4  PELVIC:    External genitalia: normal without lesion  Vagina: normal mucosa and rugae, no discharge, vaginal cuff appears intact  Cervix: absent  Uterus: absent  Adnexa: non tender, without masses  Rectal: deffered  Ext=no clubbing or cyanosis      Pathology=8/13/19:  Final Diagnosis    Uterus, cervix and fallopian tubes, hysterectomy and bilateral salpingectomy -  1.  Proliferative endometrium.  2.  Intramural leiomyoma.  3.  Benign cervix and fallopian tubes with no diagnostic alterations.          Assessment  1.)  S/p TLH with bilateral salpingectomy on 8/13/19=benign pathology  2.)  Left basilic vein clot        Plan  1.)  Follow-up as needed  2.)  Continue Bert Vyas DO

## 2019-09-30 NOTE — PATIENT INSTRUCTIONS
If you have any questions regarding your visit, Please contact your care team.    Women s Health CLINIC HOURS TELEPHONE NUMBER   Nydia Vyas M.D.    Macy Musa           Monday-[facility]  7:00 am - 5 pm Monday's Tuesday- [facility]  8:00am- 5 pm  Wednesday- Off  Thursday- Off Surgery  Friday-Am Bob, and [facility]  Bob 8:00-11:30 AM  [place] 1:00-5:00 PM [facility]  [address] [place] [address]  [phone] ask for [facility]    Imaging [phone]    [facility]  [address] [place], [place] [id number] [phone]  Imaging [phone]    [facility]  [address] [place], [place] [id number] [phone]  Imaging Scheduling - [phone]     Urgent Care locations:    [facility] Saturday and Sunday   9 am - 5 pm    Monday-Friday   12 pm - 8 pm  Saturday and Sunday   9 am - 5 pm   [phone]       If you need a medication refill, please contact your pharmacy. Please allow 3 business days for your refill to be completed.  As always, Thank you for trusting us with your healthcare needs!

## 2020-01-08 ENCOUNTER — MYC MEDICAL ADVICE (OUTPATIENT)
Dept: FAMILY MEDICINE | Facility: CLINIC | Age: 50
End: 2020-01-08

## 2020-01-08 DIAGNOSIS — Z76.89 SLEEP CONCERN: Primary | ICD-10-CM

## 2020-01-08 NOTE — TELEPHONE ENCOUNTER
Information below is reviewed with  Kristen Kehr, PA-C, Verbal Orders okay for Sleep referral.  Sleep concern, diagnosis.   Per protocol, will route encounter to be cosigned by provider for Verbal Orders.  Shireen Short RN

## 2020-01-14 ENCOUNTER — MYC MEDICAL ADVICE (OUTPATIENT)
Dept: FAMILY MEDICINE | Facility: CLINIC | Age: 50
End: 2020-01-14

## 2020-01-14 NOTE — TELEPHONE ENCOUNTER
Information below is reviewed with  Kristen Kehr, PA-C, Verbal Orders, patient needs to go to emergency department for evaluation.  Call to patient, advise emergency department now, not to drive self.   Patient/parent verbalized understanding of instructions provided and agreed with the plan of care      Per protocol, will route encounter to be cosigned by provider for Verbal Orders.  Shireen Short RN

## 2020-02-12 PROBLEM — Z91.89 SEDENTARY LIFESTYLE: Status: ACTIVE | Noted: 2019-05-06

## 2020-02-12 PROBLEM — K21.9 GASTROESOPHAGEAL REFLUX DISEASE, ESOPHAGITIS PRESENCE NOT SPECIFIED: Status: ACTIVE | Noted: 2019-05-06

## 2020-02-12 PROBLEM — S63.592A: Status: RESOLVED | Noted: 2018-12-06 | Resolved: 2020-02-12

## 2020-02-12 PROBLEM — N92.1 MENOMETRORRHAGIA: Status: ACTIVE | Noted: 2019-08-12

## 2020-02-12 PROBLEM — Z90.710 S/P HYSTERECTOMY: Status: RESOLVED | Noted: 2019-08-30 | Resolved: 2020-02-12

## 2020-02-12 PROBLEM — K21.9 GASTROESOPHAGEAL REFLUX DISEASE, ESOPHAGITIS PRESENCE NOT SPECIFIED: Chronic | Status: ACTIVE | Noted: 2019-05-06

## 2020-02-13 ENCOUNTER — OFFICE VISIT (OUTPATIENT)
Dept: SLEEP MEDICINE | Facility: CLINIC | Age: 50
End: 2020-02-13
Attending: PHYSICIAN ASSISTANT
Payer: COMMERCIAL

## 2020-02-13 VITALS
HEART RATE: 63 BPM | BODY MASS INDEX: 41.02 KG/M2 | OXYGEN SATURATION: 97 % | HEIGHT: 71 IN | WEIGHT: 293 LBS | DIASTOLIC BLOOD PRESSURE: 74 MMHG | SYSTOLIC BLOOD PRESSURE: 120 MMHG

## 2020-02-13 DIAGNOSIS — Z76.89 SLEEP CONCERN: Primary | ICD-10-CM

## 2020-02-13 DIAGNOSIS — R06.00 DYSPNEA AND RESPIRATORY ABNORMALITY: ICD-10-CM

## 2020-02-13 DIAGNOSIS — G47.10 ORGANIC HYPERSOMNIA: ICD-10-CM

## 2020-02-13 DIAGNOSIS — R53.81 MALAISE AND FATIGUE: ICD-10-CM

## 2020-02-13 DIAGNOSIS — R53.83 MALAISE AND FATIGUE: ICD-10-CM

## 2020-02-13 DIAGNOSIS — R06.89 DYSPNEA AND RESPIRATORY ABNORMALITY: ICD-10-CM

## 2020-02-13 DIAGNOSIS — F41.1 GAD (GENERALIZED ANXIETY DISORDER): Chronic | ICD-10-CM

## 2020-02-13 PROCEDURE — 99204 OFFICE O/P NEW MOD 45 MIN: CPT | Performed by: INTERNAL MEDICINE

## 2020-02-13 SDOH — HEALTH STABILITY: MENTAL HEALTH: HOW OFTEN DO YOU HAVE A DRINK CONTAINING ALCOHOL?: 2-4 TIMES A MONTH

## 2020-02-13 ASSESSMENT — MIFFLIN-ST. JEOR
SCORE: 2060.17
SCORE: 2060.17

## 2020-02-13 NOTE — PROGRESS NOTES
Sleep Consultation:    Date on this visit: 2/13/2020    Deepa Anderson is sent by Kristen M Kehr for a sleep consultation regarding sleep concern.    Primary Physician: Kehr, Kristen M     Chief Complaint   Patient presents with     Sleep Problem     Always wake up tired and low energy. Family says I snore. Fitbit indicates I am awake on average an hour or more each night without realizing i am awake.          Deepa goes to bed at 9:00 PM during the week. She gets up at 7:00 AM with an alarm starting at 6 AM. She falls asleep in 45 minutes.  Deepa has difficulty falling asleep infrequently.  She wakes up infrequently. On weekends, Deepa goes to sleep at 11:00 PM.  She gets up at 7:30 AM without an alarm.     Patient does watch TV in bed.     Deepa does not do shift work.  She works day shifts.      Deepa does snore. Patient does not have a regular bed partner. They always sleep separately. She does not have witnessed apneas.   Patient sleeps on her back < side and stomach. She denies no morning headaches and restless legs.     Deepa denies any sleep walking, sleep talking and dream enactment.    Patient's [place] Sleepiness score 10/24 consistent with excessive  daytime sleepiness. She feels she has a mixture of fatigue and sleepiness.     Deepa naps 1-2 times per week for 30-60 minutes. She takes some inadvertant naps.  She denies dozing while driving. She uses 1 cups/day of coffee.    Lab Results   Component Value Date    TSH 2.10 06/10/2019     CBC RESULTS:   Recent Labs   Lab Test 06/10/19  1209  06/29/16  0904   WBC  --   --  10.8   RBC  --   --  4.60   HGB 13.0   < > 14.1   HCT  --   --  42.7   MCV  --   --  93   MCH  --   --  30.7   MCHC  --   --  33.0   RDW  --   --  12.7   PLT  --   --  234    < > = values in this interval not displayed.     Recent Labs   Lab Test 01/31/18  0858 12/20/16  0934 06/29/16  0904 05/26/15  0930   NA  --   --  137 142   POTASSIUM  --   --  4.4 3.8   CHLORIDE  --   --  103 108   CO2   --   --  24 27   ANIONGAP  --   --  10 7   GLC 90 94 105* 97   BUN  --   --  8 9   CR  --   --  0.69 0.78   BOLIVAR  --   --  9.1 8.6           Allergies:    Allergies   Allergen Reactions     Percodan [Aspirin]      Percolone [Oxycodone]      Per PT had no problems with this medication       Medications:    Current Outpatient Medications   Medication Sig Dispense Refill     citalopram (CELEXA) 20 MG tablet Take 1 tablet (20 mg) by mouth daily 90 tablet 3     loperamide (IMODIUM) 2 MG capsule Take 2 mg by mouth       omeprazole (PRILOSEC) 20 MG DR capsule Take 1 capsule (20 mg) by mouth daily 90 capsule 3       Problem List:  Patient Active Problem List    Diagnosis Date Noted     Hypertriglyceridemia 2019     Priority: Medium     Body mass index (BMI) of 40.0-44.9 in adult (H) 2019     Priority: Medium     Tobacco use disorder 2016     Priority: Medium     Menometrorrhagia 2019     Priority: Low     Gastroesophageal reflux disease, esophagitis presence not specified 2019     Priority: Low     Sedentary lifestyle 2019     Priority: Low     Tubular adenoma of colon 10/12/2015     Priority: Low     Colonoscopy , repeat in 3 years.        CARDIOVASCULAR SCREENING; LDL GOAL LESS THAN 160 2013     Priority: Low     History of dysplastic nevus 2012     Priority: Low     Acne vulgaris 2012     Priority: Low        Past Medical/Surgical History:  Past Medical History:   Diagnosis Date     Actinic keratosis 2010    porokeratosis R hand     Diarrhea 2015     H. pylori infection 2014     Tear of triangular fibrocartilage of left wrist 2018     Past Surgical History:   Procedure Laterality Date     BIOPSY  2015    polyp removed      SECTION  2002     births      SECTION  2006     CHOLECYSTECTOMY  06/15/2016     COLONOSCOPY  2015     COMBINED ESOPHAGOSCOPY, GASTROSCOPY, DUODENOSCOPY (EGD) WITH CO2 INSUFFLATION N/A  5/16/2017    Procedure: COMBINED ESOPHAGOSCOPY, GASTROSCOPY, DUODENOSCOPY (EGD) WITH CO2 INSUFFLATION;  EGD-MASS OF NECK/ DELL;  Surgeon: Sohan Renee MD;  Location: [facility] OR     CYSTECTOMY PILONIDAL  2005     EXCISE MASS NECK Left 6/29/2017    Procedure: EXCISE MASS NECK;;  Surgeon: Rayray Morton MD;  Location: [facility] OR     LAPAROSCOPIC HYSTERECTOMY TOTAL N/A 08/13/2019    TLH with BL salpingectomy     LARYNGOSCOPY WITH BIOPSY(IES) N/A 6/29/2017    Procedure: LARYNGOSCOPY WITH BIOPSY(IES);  Direct laryngoscopy with biopsy and excision of left neck mass;  Surgeon: Rayray Morton MD;  Location: [facility] OR     TUBAL LIGATION  2006     WRIST SURGERY Left 12/14/2018    TFCC repair, removal of bone chip at [facility] in [place]       Social History:  Social History     Socioeconomic History     Marital status:      Spouse name: Not on file     Number of children: Not on file     Years of education: Not on file     Highest education level: Not on file   Occupational History     Occupation:      Employer: [employer]   Social Needs     Financial resource strain: Not on file     Food insecurity:     Worry: Not on file     Inability: Not on file     Transportation needs:     Medical: Not on file     Non-medical: Not on file   Tobacco Use     Smoking status: Light Tobacco Smoker     Packs/day: 0.50     Years: 25.00     Pack years: 12.50     Types: Cigarettes     Smokeless tobacco: Current User   Substance and Sexual Activity     Alcohol use: Yes     Comment: 3-4 drinks per week     Drug use: No     Sexual activity: Not Currently     Partners: Male     Birth control/protection: Female Surgical, None     Comment: Tubes tied 2006   Lifestyle     Physical activity:     Days per week: Not on file     Minutes per session: Not on file     Stress: Not on file   Relationships     Social connections:     Talks on phone: Not on file     Gets together: Not on file     Attends Jainism service: Not on file      Active member of club or organization: Not on file     Attends meetings of clubs or organizations: Not on file     Relationship status: Not on file     Intimate partner violence:     Fear of current or ex partner: Not on file     Emotionally abused: Not on file     Physically abused: Not on file     Forced sexual activity: Not on file   Other Topics Concern     Parent/sibling w/ CABG, MI or angioplasty before 65F 55M? No   Social History Narrative     Not on file       Family History:  Family History   Problem Relation Age of Onset     Arthritis Mother      Lipids Mother      Hypertension Mother      Obesity Mother      Thyroid Disease Mother      Diabetes Mother         Diet controlled     Cardiovascular Father      Hypertension Father      Cerebrovascular Disease Father      Hypertension Brother      Hypertension Brother      Cancer No family hx of      Glaucoma No family hx of      Macular Degeneration No family hx of      Diabetes No family hx of        Review of Systems:  A complete review of systems reviewed by me is negative with the exeption of what has been mentioned in the history of present illness.  CONSTITUTIONAL: NEGATIVE for weight gain/loss, fever, chills, sweats or night sweats, drug allergies.  EYES: NEGATIVE for changes in vision, blind spots, double vision.  ENT: NEGATIVE for ear pain, sore throat, sinus pain, post-nasal drip, runny nose, bloody nose  CARDIAC:  POSITIVE for  fast heart beats  NEUROLOGIC:  POSITIVE for  weakness or numbness in the arms or legs  DERMATOLOGIC: NEGATIVE for rashes, new moles or change in mole(s)  PULMONARY:  POSITIVE for  productive cough  GASTROINTESTINAL: NEGATIVE for nausea or vomitting, loose or watery stools, fat or grease in stools, constipation, abdominal pain, bowel movements black in color or blood noted.  GENITOURINARY: NEGATIVE for pain during urination, blood in urine, urinating more frequently than usual, irregular menstrual  "periods.  MUSCULOSKELETAL:  POSITIVE for  swollen joints  ENDOCRINE: NEGATIVE for increased thirst or urination, diabetes.  LYMPHATIC: NEGATIVE for swollen lymph nodes, lumps or bumps in the breasts or nipple discharge.    Physical Examination:  Vitals: /74   Pulse 63   Ht 1.803 m (5' 11\")   Wt 132.9 kg (293 lb)   LMP 05/06/2019 (Approximate)   SpO2 97%   BMI 40.87 kg/m    BMI= Body mass index is 40.87 kg/m .    Neck Cir (cm): 40 cm    Angela Total Score 2/13/2020   Total score - Angela 10       RAFAEL Total Score: 13 (02/13/20 0800)    GENERAL APPEARANCE: alert and no distress  EYES: Eyes grossly normal to inspection and conjunctivae and sclerae normal  HENT: nose and mouth without ulcers or lesions  NECK: no adenopathy, no asymmetry, masses, or scars and thyroid normal to palpation  RESP: lungs clear to auscultation - no rales, rhonchi or wheezes  CV: regular rates and rhythm, normal S1 S2, no S3 or S4 and no murmur, click or rub  ABDOMEN: soft, nontender, without hepatosplenomegaly or masses and bowel sounds normal  MS: extremities normal- no gross deformities noted  NEURO: Normal strength and tone, mentation intact, speech normal and cranial nerves 2-12 intact  PSYCH: mentation appears normal and affect normal/bright  Mallampati Class: II.  Tonsillar Stage: 1  hidden by pillars.    Impression/Plan:    Socially disruptive snoring, no witnessed apneas/no bed partner, fatigue/excessive daytime sleepiness (ESS 10). She is interested in Home Sleep ApneaTest for modest chance of obstructive sleep apnea STOPBAN 3    History of insomnia  Currently Quiescent   - Read the book Say Good Night To Insomnia        Literature provided regarding sleep apnea and insomnia.      She will follow up with me in approximately two weeks after her sleep study has been competed to review the results and discuss plan of care.       Polysomnography reviewed.  Obstructive sleep apnea reviewed.  Complications of untreated sleep " apnea were reviewed.    Gil Siddiqui MD     CC: Kristen M Kehr

## 2020-02-13 NOTE — NURSING NOTE
"Chief Complaint   Patient presents with     Sleep Problem     Always wake up tired and low energy. Family says I snore. Fitbit indicates I am awake on average an hour or more each night without realizing i am awake.        Initial /74   Pulse 63   Ht 1.803 m (5' 11\")   Wt 132.9 kg (293 lb)   LMP 05/06/2019 (Approximate)   SpO2 97%   BMI 40.87 kg/m   Estimated body mass index is 40.87 kg/m  as calculated from the following:    Height as of this encounter: 1.803 m (5' 11\").    Weight as of this encounter: 132.9 kg (293 lb).    Medication Reconciliation: complete    Neck circumference: 15.5 inches / 40 centimeters.      "

## 2020-02-13 NOTE — PATIENT INSTRUCTIONS
Your BMI is Body mass index is 40.87 kg/m .  Weight management is a personal decision.  If you are interested in exploring weight loss strategies, the following discussion covers the approaches that may be successful. Body mass index (BMI) is one way to tell whether you are at a healthy weight, overweight, or obese. It measures your weight in relation to your height.  A BMI of 18.5 to 24.9 is in the healthy range. A person with a BMI of 25 to 29.9 is considered overweight, and someone with a BMI of 30 or greater is considered obese. More than two-thirds of American adults are considered overweight or obese.  Being overweight or obese increases the risk for further weight gain. Excess weight may lead to heart disease and diabetes.  Creating and following plans for healthy eating and physical activity may help you improve your health.  Weight control is part of healthy lifestyle and includes exercise, emotional health, and healthy eating habits. Careful eating habits lifelong are the mainstay of weight control. Though there are significant health benefits from weight loss, long-term weight loss with diet alone may be very difficult to achieve- studies show long-term success with dietary management in less than 10% of people. Attaining a healthy weight may be especially difficult to achieve in those with severe obesity. In some cases, medications, devices and surgical management might be considered.  What can you do?  If you are overweight or obese and are interested in methods for weight loss, you should discuss this with your provider.     Consider reducing daily calorie intake by 500 calories.     Keep a food journal.     Avoiding skipping meals, consider cutting portions instead.    Diet combined with exercise helps maintain muscle while optimizing fat loss. Strength training is particularly important for building and maintaining muscle mass. Exercise helps reduce stress, increase energy, and improves fitness.  Increasing exercise without diet control, however, may not burn enough calories to loose weight.       Start walking three days a week 10-20 minutes at a time    Work towards walking thirty minutes five days a week     Eventually, increase the speed of your walking for 1-2 minutes at time    In addition, we recommend that you review healthy lifestyles and methods for weight loss available through the National Institutes of Health patient information sites:  http://win.niddk.nih.gov/publications/index.htm    And look into health and wellness programs that may be available through your health insurance provider, employer, local community center, or karen club.    Weight management plan: Patient was referred to their PCP to discuss a diet and exercise plan.  Read the book Say Good Night To Insomnia

## 2020-02-18 ENCOUNTER — OFFICE VISIT (OUTPATIENT)
Dept: SLEEP MEDICINE | Facility: CLINIC | Age: 50
End: 2020-02-18
Payer: COMMERCIAL

## 2020-02-18 DIAGNOSIS — R06.89 DYSPNEA AND RESPIRATORY ABNORMALITY: ICD-10-CM

## 2020-02-18 DIAGNOSIS — F41.1 GAD (GENERALIZED ANXIETY DISORDER): Chronic | ICD-10-CM

## 2020-02-18 DIAGNOSIS — G47.10 ORGANIC HYPERSOMNIA: ICD-10-CM

## 2020-02-18 DIAGNOSIS — R53.83 MALAISE AND FATIGUE: ICD-10-CM

## 2020-02-18 DIAGNOSIS — R06.00 DYSPNEA AND RESPIRATORY ABNORMALITY: ICD-10-CM

## 2020-02-18 DIAGNOSIS — R53.81 MALAISE AND FATIGUE: ICD-10-CM

## 2020-02-18 DIAGNOSIS — Z76.89 SLEEP CONCERN: ICD-10-CM

## 2020-02-18 PROCEDURE — G0399 HOME SLEEP TEST/TYPE 3 PORTA: HCPCS | Performed by: INTERNAL MEDICINE

## 2020-02-18 NOTE — PROGRESS NOTES
Pt is completing a home sleep test. Pt was instructed on how to put on the Noxturnal T3 device and associated equipment before going to bed and given the opportunity to practice putting it on before leaving the sleep center. Pt was reminded to bring the home sleep test kit back to the center tomorrow, at agreed upon time for download and reporting.       Marjorie Mcgee MA on 2/18/2020 at 4:21 PM

## 2020-02-19 ENCOUNTER — APPOINTMENT (OUTPATIENT)
Dept: SLEEP MEDICINE | Facility: CLINIC | Age: 50
End: 2020-02-19
Payer: COMMERCIAL

## 2020-02-19 NOTE — PROGRESS NOTES
Pt returned HST device. It was downloaded and forwarded data to the clinical specialist for scoring.    Marjorie Mcgee MA on 2/19/2020 at 9:23 AM

## 2020-02-19 NOTE — PROCEDURES
"HOME SLEEP STUDY INTERPRETATION    Patient: Deepa Anderson  MRN: [id number]  YOB: 1970  Study Date: 2/18/2020  Referring Provider: Kehr, Kristen M  Ordering Provider: Gil Siddiqui MD     Indications for Home Study: Deepa Anderson is a 49 year old female with symptoms suggestive of obstructive sleep apnea.    Estimated body mass index is 40.87 kg/m  as calculated from the following:    Height as of 2/13/20: 1.803 m (5' 11\").    Weight as of 2/13/20: 132.9 kg (293 lb).  Total score - [place]: 10 (2/13/2020  8:00 AM)  STOP-BANG: 3/8    Data: A full night home sleep study was performed recording the standard physiologic parameters including body position, movement, sound, nasal pressure, thermal oral airflow, chest and abdominal movements with respiratory inductance plethysmography, and oxygen saturation by pulse oximetry. Pulse rate was estimated by oximetry recording. This study was considered adequate based on > 4 hours of quality oximetry and respiratory recording. As specified by the AASM Manual for the Scoring of Sleep and Associated events, version 2.3, Rule VIII.D 1B, 4% oxygen desaturation scoring for hypopneas is used as a standard of care on all home sleep apnea testing.    Analysis Time:  542 minutes    Respiration:   Sleep Associated Hypoxemia: sustained hypoxemia was not present. Baseline oxygen saturation was 91%.  Time with saturation less than or equal to 88% was 3.8 minutes. The lowest oxygen saturation was 87%.   Snoring: Snoring was present on tracing but not heard.  Respiratory events: The home study revealed a presence of 0 obstructive apneas and 0 mixed and central apneas. There were 5 hypopneas resulting in a combined apnea/hypopnea index [AHI] of 0.6 events per hour.  AHI was 0 per hour supine, 0.6 per hour prone, 1.4 per hour on left side, and 0.5 per hour on right side.   Pattern: Excluding events noted above, respiratory rate and pattern was Normal.    Position: Percent of " time spent: supine - 4%, prone - 38%, on left - 16%, on right - 24%. Upright 18%    Heart Rate: By pulse oximetry normal rate was noted.     Assessment:   No evidence of severe obstructive sleep apnea.  Sleep associated hypoxemia was not present.    Recommendations:  Consider attended Polysomnogram if clinical suspicion of PARESH is moderate or high.  Suggest optimizing sleep hygiene and avoiding sleep deprivation.      Diagnosis Code(s): Obstructive Sleep Apnea G47.33, Hypoxemia G47.36    Gil Siddiqui MD, February 19, 2020   Diplomate, American Board of Internal Medicine, Sleep Medicine

## 2020-02-19 NOTE — PROGRESS NOTES
This HSAT was performed using a Noxturnal T3 device which recorded snore, sound, movement activity, body position, nasal pressure, oronasal thermal airflow, pulse, oximetry and both chest and abdominal respiratory effort. HSAT data was restricted to the time patient states they were in bed.     HSAT was scored using 1B 4% hypopnea rule.     HST AHI (Non-PAT): 0.6  Snoring was reported as mild.  Time with SpO2 below 89% was 3.8 minutes.   Overall signal quality was good     Pt will follow up with sleep provider to determine appropriate therapy.

## 2020-02-24 ENCOUNTER — HEALTH MAINTENANCE LETTER (OUTPATIENT)
Age: 50
End: 2020-02-24

## 2020-03-04 ENCOUNTER — OFFICE VISIT (OUTPATIENT)
Dept: SLEEP MEDICINE | Facility: CLINIC | Age: 50
End: 2020-03-04
Payer: COMMERCIAL

## 2020-03-04 VITALS
SYSTOLIC BLOOD PRESSURE: 126 MMHG | HEART RATE: 71 BPM | WEIGHT: 293 LBS | DIASTOLIC BLOOD PRESSURE: 85 MMHG | OXYGEN SATURATION: 98 % | HEIGHT: 71 IN | BODY MASS INDEX: 41.02 KG/M2

## 2020-03-04 DIAGNOSIS — G47.00 INSOMNIA, UNSPECIFIED TYPE: Primary | ICD-10-CM

## 2020-03-04 PROCEDURE — 99213 OFFICE O/P EST LOW 20 MIN: CPT | Performed by: INTERNAL MEDICINE

## 2020-03-04 ASSESSMENT — MIFFLIN-ST. JEOR: SCORE: 2064.71

## 2020-03-04 NOTE — NURSING NOTE
"Chief Complaint   Patient presents with     Study Results       Initial /85   Pulse 71   Ht 1.803 m (5' 11\")   Wt 133.4 kg (294 lb)   LMP 05/06/2019 (Approximate)   SpO2 98%   BMI 41.00 kg/m   Estimated body mass index is 41 kg/m  as calculated from the following:    Height as of this encounter: 1.803 m (5' 11\").    Weight as of this encounter: 133.4 kg (294 lb).    Medication Reconciliation: complete      "

## 2020-03-04 NOTE — PROGRESS NOTES
"Home Sleep Apnea Testing Results Visit:    Chief Complaint   Patient presents with     Study Results       Deepa Anderson is a 49 year old female who returns to [facility] after having had Home Sleep Apnea Testing.  She presented with socially disruptive snoring, no witnessed apneas/no bed partner, fatigue/excessive daytime sleepiness (ESS 10).   - History of insomnia    Estimated body mass index is 41 kg/m  as calculated from the following:    Height as of this encounter: 1.803 m (5' 11\").    Weight as of this encounter: 133.4 kg (294 lb).  Total score - [place]: 10 (2/13/2020  8:00 AM)   STOP-BANG: 3/8    Home Sleep Apnea Testing - 2/18/20: 293 lb AHI 0.6/hr. Supine AHI 0/hr.   Oxygen [place] of 87%.  Baseline 91%.  Sp02 =< 88% for 3.8 minutes  She slept on her back (4%), prone (38%), left (16%) and right (24%) sides.   Analysis time: 542 minutes.     Signal quality of Oxymeter 100% Good  Nasal Cannula 100% Good  RIP belts 100% Good.     Deepa Anderson reports that she slept Fair .     Home Sleep Apnea Testing was reviewed in detail today with Deepa and a copy given to her for her records.    Past medical/surgical history, family history, social history, medications and allergies were reviewed.      /85   Pulse 71   Ht 1.803 m (5' 11\")   Wt 133.4 kg (294 lb)   LMP 05/06/2019 (Approximate)   SpO2 98%   BMI 41.00 kg/m      Impression/Plan:  No evidence of severe Obstructive Sleep Apnea.     Home Sleep ApneaTest has an approximate 20% false negative rate  Options discussed today including further evaluation with attended Polysomnogram with attention to supine positioning.       15 minutes spent with patient with >50% spent in counseling and coordination of care.            "

## 2020-03-04 NOTE — PATIENT INSTRUCTIONS
Your BMI is Body mass index is 41 kg/m .  Weight management is a personal decision.  If you are interested in exploring weight loss strategies, the following discussion covers the approaches that may be successful. Body mass index (BMI) is one way to tell whether you are at a healthy weight, overweight, or obese. It measures your weight in relation to your height.  A BMI of 18.5 to 24.9 is in the healthy range. A person with a BMI of 25 to 29.9 is considered overweight, and someone with a BMI of 30 or greater is considered obese. More than two-thirds of American adults are considered overweight or obese.  Being overweight or obese increases the risk for further weight gain. Excess weight may lead to heart disease and diabetes.  Creating and following plans for healthy eating and physical activity may help you improve your health.  Weight control is part of healthy lifestyle and includes exercise, emotional health, and healthy eating habits. Careful eating habits lifelong are the mainstay of weight control. Though there are significant health benefits from weight loss, long-term weight loss with diet alone may be very difficult to achieve- studies show long-term success with dietary management in less than 10% of people. Attaining a healthy weight may be especially difficult to achieve in those with severe obesity. In some cases, medications, devices and surgical management might be considered.  What can you do?  If you are overweight or obese and are interested in methods for weight loss, you should discuss this with your provider.     Consider reducing daily calorie intake by 500 calories.     Keep a food journal.     Avoiding skipping meals, consider cutting portions instead.    Diet combined with exercise helps maintain muscle while optimizing fat loss. Strength training is particularly important for building and maintaining muscle mass. Exercise helps reduce stress, increase energy, and improves fitness.  Increasing exercise without diet control, however, may not burn enough calories to loose weight.       Start walking three days a week 10-20 minutes at a time    Work towards walking thirty minutes five days a week     Eventually, increase the speed of your walking for 1-2 minutes at time    In addition, we recommend that you review healthy lifestyles and methods for weight loss available through the National Institutes of Health patient information sites:  http://win.niddk.nih.gov/publications/index.htm    And look into health and wellness programs that may be available through your health insurance provider, employer, local community center, or karen club.    Weight management plan: Patient was referred to their PCP to discuss a diet and exercise plan.  Positioning Device zzoma, rigoberto bump, tennis ball tshirt.   This example shows a pillow that straps around the waist. It may be appropriate for those whose sleep study shows milder sleep apnea that occurs primarily when lying flat on one's back. Preliminary studies have shown benefit but effectiveness at home should be verified.

## 2020-08-20 ENCOUNTER — VIRTUAL VISIT (OUTPATIENT)
Dept: FAMILY MEDICINE | Facility: CLINIC | Age: 50
End: 2020-08-20
Payer: COMMERCIAL

## 2020-08-20 DIAGNOSIS — N95.1 PERIMENOPAUSE: Primary | ICD-10-CM

## 2020-08-20 DIAGNOSIS — Z12.31 VISIT FOR SCREENING MAMMOGRAM: ICD-10-CM

## 2020-08-20 PROCEDURE — 99213 OFFICE O/P EST LOW 20 MIN: CPT | Mod: 95 | Performed by: PHYSICIAN ASSISTANT

## 2020-08-20 RX ORDER — CITALOPRAM HYDROBROMIDE 20 MG/1
20 TABLET ORAL DAILY
Qty: 90 TABLET | Refills: 3 | Status: SHIPPED | OUTPATIENT
Start: 2020-08-20 | End: 2021-07-15

## 2020-08-20 NOTE — PROGRESS NOTES
"Deepa Anderson is a 50 year old female who is being evaluated via a billable video visit.      The patient has been notified of following:     \"This video visit will be conducted via a call between you and your physician/provider. We have found that certain health care needs can be provided without the need for an in-person physical exam.  This service lets us provide the care you need with a video conversation.  If a prescription is necessary we can send it directly to your pharmacy.  If lab work is needed we can place an order for that and you can then stop by our lab to have the test done at a later time.    Video visits are billed at different rates depending on your insurance coverage.  Please reach out to your insurance provider with any questions.    If during the course of the call the physician/provider feels a video visit is not appropriate, you will not be charged for this service.\"    Patient has given verbal consent for Video visit? Yes  How would you like to obtain your AVS? MyChart  If you are dropped from the video visit, the video invite should be resent to: Text to cell phone: [phone]  Will anyone else be joining your video visit? No    Subjective     Deepa Anderson is a 50 year old female who presents today via video visit for the following health issues:    HPI      Needs refills of her Celexa and discuss physical that she is over due for work         Video Start Time: 11:24 AM        Review of Systems   Constitutional, HEENT, cardiovascular, pulmonary, GI, , musculoskeletal, neuro, skin, endocrine and psych systems are negative, except as otherwise noted.      Objective           Vitals:  No vitals were obtained today due to virtual visit.    Physical Exam     GENERAL: Healthy, alert and no distress  EYES: Eyes grossly normal to inspection.  No discharge or erythema, or obvious scleral/conjunctival abnormalities.  RESP: No audible wheeze, cough, or visible cyanosis.  No visible " retractions or increased work of breathing.    SKIN: Visible skin clear. No significant rash, abnormal pigmentation or lesions.  NEURO: Cranial nerves grossly intact.  Mentation and speech appropriate for age.  PSYCH: Mentation appears normal, affect normal/bright, judgement and insight intact, normal speech and appearance well-groomed.              Assessment & Plan     Perimenopause  Continue with her current dose.   Moods are stable with the citalopram  Health maintenance reviewed. Mammogram ordered for her to schedule the appointment. Colonoscopy was cancelled to due COVID and being rescheduled. Plan for a routine office visit in 6 months.   - citalopram (CELEXA) 20 MG tablet; Take 1 tablet (20 mg) by mouth daily     Tobacco Cessation:   reports that she has been smoking cigarettes. She has a 12.50 pack-year smoking history. She uses smokeless tobacco.  Tobacco Cessation Action Plan: Information offered: Patient not interested at this time            No follow-ups on file.    Kristen M. Kehr, PA-C  [facility]      Video-Visit Details    Type of service:  Video Visit    Video End Time:11:33 AM    Originating Location (pt. Location): Home    Distant Location (provider location):  [facility]     Platform used for Video Visit: Shelli

## 2020-12-13 ENCOUNTER — HEALTH MAINTENANCE LETTER (OUTPATIENT)
Age: 50
End: 2020-12-13

## 2021-02-12 ENCOUNTER — TRANSFERRED RECORDS (OUTPATIENT)
Dept: HEALTH INFORMATION MANAGEMENT | Facility: CLINIC | Age: 51
End: 2021-02-12

## 2021-03-22 ENCOUNTER — ANCILLARY PROCEDURE (OUTPATIENT)
Dept: MAMMOGRAPHY | Facility: CLINIC | Age: 51
End: 2021-03-22
Payer: COMMERCIAL

## 2021-03-22 DIAGNOSIS — Z12.31 VISIT FOR SCREENING MAMMOGRAM: ICD-10-CM

## 2021-03-22 PROCEDURE — 77067 SCR MAMMO BI INCL CAD: CPT | Mod: TC | Performed by: RADIOLOGY

## 2021-04-01 NOTE — NURSING NOTE
"Chief Complaint   Patient presents with     Surgical Followup     Post Op Direct Laryngoscopy with biopsy. DOS: 6/29/17       Initial Resp 16  Ht 1.803 m (5' 11\")  Wt 128.8 kg (284 lb)  BMI 39.61 kg/m2 Estimated body mass index is 39.61 kg/(m^2) as calculated from the following:    Height as of this encounter: 1.803 m (5' 11\").    Weight as of this encounter: 128.8 kg (284 lb).  Medication Reconciliation: complete     Sonal Tapia MA    "
none

## 2021-04-07 ENCOUNTER — ANCILLARY PROCEDURE (OUTPATIENT)
Dept: ULTRASOUND IMAGING | Facility: CLINIC | Age: 51
End: 2021-04-07
Attending: PHYSICIAN ASSISTANT
Payer: COMMERCIAL

## 2021-04-07 ENCOUNTER — ANCILLARY PROCEDURE (OUTPATIENT)
Dept: MAMMOGRAPHY | Facility: CLINIC | Age: 51
End: 2021-04-07
Attending: PHYSICIAN ASSISTANT
Payer: COMMERCIAL

## 2021-04-07 DIAGNOSIS — R92.8 ABNORMAL MAMMOGRAM: ICD-10-CM

## 2021-04-07 PROCEDURE — G0279 TOMOSYNTHESIS, MAMMO: HCPCS | Performed by: RADIOLOGY

## 2021-04-07 PROCEDURE — 77065 DX MAMMO INCL CAD UNI: CPT | Performed by: RADIOLOGY

## 2021-04-07 PROCEDURE — 76642 ULTRASOUND BREAST LIMITED: CPT | Mod: RT | Performed by: RADIOLOGY

## 2021-04-12 DIAGNOSIS — R92.8 ABNORMAL MAMMOGRAM: ICD-10-CM

## 2021-04-12 PROCEDURE — U0005 INFEC AGEN DETEC AMPLI PROBE: HCPCS | Performed by: PHYSICIAN ASSISTANT

## 2021-04-12 PROCEDURE — U0003 INFECTIOUS AGENT DETECTION BY NUCLEIC ACID (DNA OR RNA); SEVERE ACUTE RESPIRATORY SYNDROME CORONAVIRUS 2 (SARS-COV-2) (CORONAVIRUS DISEASE [COVID-19]), AMPLIFIED PROBE TECHNIQUE, MAKING USE OF HIGH THROUGHPUT TECHNOLOGIES AS DESCRIBED BY CMS-2020-01-R: HCPCS | Performed by: PHYSICIAN ASSISTANT

## 2021-04-13 LAB
LABORATORY COMMENT REPORT: ABNORMAL
SARS-COV-2 RNA RESP QL NAA+PROBE: NORMAL
SARS-COV-2 RNA RESP QL NAA+PROBE: POSITIVE
SPECIMEN SOURCE: ABNORMAL
SPECIMEN SOURCE: NORMAL

## 2021-04-22 ENCOUNTER — ANCILLARY PROCEDURE (OUTPATIENT)
Dept: ULTRASOUND IMAGING | Facility: CLINIC | Age: 51
End: 2021-04-22
Attending: PHYSICIAN ASSISTANT
Payer: COMMERCIAL

## 2021-04-22 ENCOUNTER — ANCILLARY PROCEDURE (OUTPATIENT)
Dept: MAMMOGRAPHY | Facility: CLINIC | Age: 51
End: 2021-04-22
Attending: PHYSICIAN ASSISTANT
Payer: COMMERCIAL

## 2021-04-22 DIAGNOSIS — R92.8 ABNORMAL MAMMOGRAM: ICD-10-CM

## 2021-04-22 PROCEDURE — 88305 TISSUE EXAM BY PATHOLOGIST: CPT

## 2021-04-22 PROCEDURE — 19083 BX BREAST 1ST LESION US IMAG: CPT | Mod: RT

## 2021-04-22 RX ORDER — LIDOCAINE HYDROCHLORIDE 10 MG/ML
9 INJECTION, SOLUTION EPIDURAL; INFILTRATION; INTRACAUDAL; PERINEURAL ONCE
Status: COMPLETED | OUTPATIENT
Start: 2021-04-22 | End: 2021-04-22

## 2021-04-22 RX ADMIN — LIDOCAINE HYDROCHLORIDE 9 ML: 10 INJECTION, SOLUTION EPIDURAL; INFILTRATION; INTRACAUDAL; PERINEURAL at 11:13

## 2021-04-26 LAB — COPATH REPORT: NORMAL

## 2021-04-27 ENCOUNTER — TELEPHONE (OUTPATIENT)
Dept: GENERAL RADIOLOGY | Facility: CLINIC | Age: 51
End: 2021-04-27

## 2021-04-27 NOTE — TELEPHONE ENCOUNTER
Spoke to patient regarding Right breast biopsy done on 4/22/21 with finding of Fibroadenomatoid nodule with microcysts and usual ductal hyperplasia. Notified patient that the Radiologist recommendation is annual Mammography. Patient verbalized understanding and all questions and concerns answered to patients satisfaction.

## 2021-06-02 NOTE — PROGRESS NOTES
Sports Medicine Clinic Visit    PCP: Kehr, Kristen M    CC: Patient presents with:  Left Knee - Pain      HPI:  Deepa Anderson is a 50 year old female who is seen as a self referral. She notes generalized left knee pain that started around 3 months ago without injury.  On 2021, she developed left knee pain and swelling medially, also without injury.   She rates the pain at a 9/10 at its worst and a 1/10 currently.  Symptoms are relieved with limiting activity. Notes that the swelling has decreased since 2021. She endorses swelling, popping, grinding and instability. She denies numbness and tingling. She notes difficulty with extending her leg and squatting. She sometimes feels that her knee is stuck in flexion.    She works as a .     History reviewed. No pertinent past surgical/medical/family/social history other than as mentioned in HPI.  Review of systems negative except per HPI.      Patient Active Problem List   Diagnosis     History of dysplastic nevus     Acne vulgaris     CARDIOVASCULAR SCREENING; LDL GOAL LESS THAN 160     Tubular adenoma of colon     Tobacco use disorder     Body mass index (BMI) of 40.0-44.9 in adult (H)     Hypertriglyceridemia     Gastroesophageal reflux disease, esophagitis presence not specified     Sedentary lifestyle     Menometrorrhagia     MELANI (generalized anxiety disorder)     Past Medical History:   Diagnosis Date     Actinic keratosis 2010    porokeratosis R hand     Diarrhea 2015     H. pylori infection 2014     Tear of triangular fibrocartilage of left wrist 2018     Past Surgical History:   Procedure Laterality Date     BIOPSY  2015    polyp removed      SECTION  2002     births      SECTION  2006     CHOLECYSTECTOMY  06/15/2016     COLONOSCOPY  2015     COMBINED ESOPHAGOSCOPY, GASTROSCOPY, DUODENOSCOPY (EGD) WITH CO2 INSUFFLATION N/A 2017    Procedure: COMBINED ESOPHAGOSCOPY, GASTROSCOPY,  DUODENOSCOPY (EGD) WITH CO2 INSUFFLATION;  EGD-MASS OF NECK/ DELL;  Surgeon: Sohan Renee MD;  Location: [facility]     CYSTECTOMY PILONIDAL  2005     EXCISE MASS NECK Left 6/29/2017    Procedure: EXCISE MASS NECK;;  Surgeon: Rayray Morton MD;  Location: [facility] OR     LAPAROSCOPIC HYSTERECTOMY TOTAL N/A 08/13/2019    TLH with BL salpingectomy     LARYNGOSCOPY WITH BIOPSY(IES) N/A 6/29/2017    Procedure: LARYNGOSCOPY WITH BIOPSY(IES);  Direct laryngoscopy with biopsy and excision of left neck mass;  Surgeon: Rayray Morton MD;  Location: [facility] OR     TUBAL LIGATION  2006     WRIST SURGERY Left 12/14/2018    TFCC repair, removal of bone chip at [facility] in [place]     Family History   Problem Relation Age of Onset     Arthritis Mother      Lipids Mother      Hypertension Mother      Obesity Mother      Thyroid Disease Mother      Diabetes Mother         Diet controlled     Cardiovascular Father      Hypertension Father      Cerebrovascular Disease Father      Hypertension Brother      Hypertension Brother      Cancer No family hx of      Glaucoma No family hx of      Macular Degeneration No family hx of      Diabetes No family hx of      Social History     Socioeconomic History     Marital status:      Spouse name: Not on file     Number of children: Not on file     Years of education: Not on file     Highest education level: Not on file   Occupational History     Occupation:      Employer: [employer]   Social Needs     Financial resource strain: Not on file     Food insecurity     Worry: Not on file     Inability: Not on file     Transportation needs     Medical: Not on file     Non-medical: Not on file   Tobacco Use     Smoking status: Light Tobacco Smoker     Packs/day: 0.50     Years: 25.00     Pack years: 12.50     Types: Cigarettes     Smokeless tobacco: Current User   Substance and Sexual Activity     Alcohol use: Yes     Frequency: 2-4 times a month     Comment: 3-4 drinks per  week     Drug use: No     Sexual activity: Not Currently     Partners: Male     Birth control/protection: Female Surgical, None     Comment: Tubes tied 2006   Lifestyle     Physical activity     Days per week: Not on file     Minutes per session: Not on file     Stress: Not on file   Relationships     Social connections     Talks on phone: Not on file     Gets together: Not on file     Attends Muslim service: Not on file     Active member of club or organization: Not on file     Attends meetings of clubs or organizations: Not on file     Relationship status: Not on file     Intimate partner violence     Fear of current or ex partner: Not on file     Emotionally abused: Not on file     Physically abused: Not on file     Forced sexual activity: Not on file   Other Topics Concern     Parent/sibling w/ CABG, MI or angioplasty before 65F 55M? No   Social History Narrative     Not on file         Current Outpatient Medications   Medication     citalopram (CELEXA) 20 MG tablet     loperamide (IMODIUM) 2 MG capsule     omeprazole (PRILOSEC) 20 MG DR capsule     No current facility-administered medications for this visit.      Allergies   Allergen Reactions     Percodan [Aspirin]      Percolone [Oxycodone]      Per PT had no problems with this medication         Objective:  /88 (BP Location: Right arm, Patient Position: Sitting, Cuff Size: Adult Regular)   Ht 1.829 m (6')   Wt 133.4 kg (294 lb)   LMP 05/06/2019 (Approximate)   BMI 39.87 kg/m      General: Alert and in no distress    Head: Normocephalic, atraumatic  Eyes: no scleral icterus or conjunctival erythema   Skin: no erythema, petechiae, or jaundice  CV: regular rhythm by palpation, 2+ distal pulses  Resp: normal respiratory effort without conversational dyspnea   Psych: normal mood and affect    Gait: Favoring left leg  Musculoskeletal:    Bilateral Knee exam    Inspection:  No erythema, ecchymosis, warmth, or edema    Palpation: No tenderness to  palpation over the bilateral knees    Knee ROM: Full seated active knee extension and flexion bilaterally.  Crepitus with passive knee ROM.      Strength:    Left:  5/5 hip flexion/abduction/adduction, 5/5 knee extension/flexion, 5/5 ankle dorsiflexion/plantarflexion  Right:  5/5 hip flexion/abduction/adduction, 5/5 knee extension/flexion, 5/5 ankle dorsiflexion/plantarflexion    Sensation:  Intact to light touch in the bilateral lower extremities    Radiology:  X-rays ordered and independent visualization of images performed and reviewed with Deepa and her daughter, Lorie.  Recent Results (from the past 744 hour(s))   XR Knee Standing AP Bilat [place] Bilat Lat Left    Narrative    KNEE STANDING AP BILATERAL [place] BILATERAL LATERAL LEFT  6/4/2021  4:05 PM     HISTORY: Left knee pain    COMPARISON: None.      Impression    IMPRESSION:     Left knee: Mild medial compartment joint space narrowing. No evidence  of acute fracture or definite joint effusion.    Right knee: Mild-to-moderate patellofemoral degenerative changes.  Medial and lateral compartment joint spaces are preserved. No evidence  of fracture.    ERIC CARTER MD         Assessment:  1. Acute pain of left knee    2. Primary osteoarthritis of left knee        Plan:  Discussed the assessment with the patient and developed a plan together:  -Patient's preferred over the counter medication as directed on packaging as needed for pain or soreness.  -Ice 15-20 minutes for pain relief or swelling as needed.  -Physical therapy ordered at the [facility].  Please call [phone] to schedule.  Please do 5-6 days of exercises per week between formal sessions and the home exercises they provide.  -Recommend low impact cardiovascular exercises (i.e. walking, using the elliptical, stationary biking, water aerobics, swimming)  -Discussed weight loss.  -Compression as needed for swelling.    -Also discussed steroid injection    -Follow up as  needed if symptoms fail to improve or worsen.  Please call with questions or concerns.      Lynne Donovan MD, CAQ Sports Medicine  [facility]

## 2021-06-04 ENCOUNTER — OFFICE VISIT (OUTPATIENT)
Dept: ORTHOPEDICS | Facility: CLINIC | Age: 51
End: 2021-06-04
Payer: COMMERCIAL

## 2021-06-04 ENCOUNTER — ANCILLARY PROCEDURE (OUTPATIENT)
Dept: GENERAL RADIOLOGY | Facility: CLINIC | Age: 51
End: 2021-06-04
Attending: PHYSICAL MEDICINE & REHABILITATION
Payer: COMMERCIAL

## 2021-06-04 VITALS
BODY MASS INDEX: 39.68 KG/M2 | SYSTOLIC BLOOD PRESSURE: 124 MMHG | WEIGHT: 293 LBS | HEIGHT: 72 IN | DIASTOLIC BLOOD PRESSURE: 88 MMHG

## 2021-06-04 DIAGNOSIS — M25.562 ACUTE PAIN OF LEFT KNEE: Primary | ICD-10-CM

## 2021-06-04 DIAGNOSIS — M25.562 LEFT KNEE PAIN: ICD-10-CM

## 2021-06-04 DIAGNOSIS — M17.12 PRIMARY OSTEOARTHRITIS OF LEFT KNEE: ICD-10-CM

## 2021-06-04 PROCEDURE — 99203 OFFICE O/P NEW LOW 30 MIN: CPT | Performed by: PHYSICAL MEDICINE & REHABILITATION

## 2021-06-04 PROCEDURE — 73562 X-RAY EXAM OF KNEE 3: CPT | Performed by: RADIOLOGY

## 2021-06-04 ASSESSMENT — MIFFLIN-ST. JEOR: SCORE: 2075.58

## 2021-06-04 NOTE — PATIENT INSTRUCTIONS
-Patient's preferred over the counter medication as directed on packaging as needed for pain or soreness.  -Ice 15-20 minutes for pain relief or swelling as needed.  -Physical therapy ordered at the [facility].  Please call [phone] to schedule.  Please do 5-6 days of exercises per week between formal sessions and the home exercises they provide.  -Recommend low impact cardiovascular exercises (i.e. walking, using the elliptical, stationary biking, water aerobics, swimming)  -Discussed weight loss.  -Compression as needed for swelling.    -Also discussed steroid injection    -Follow up as needed if symptoms fail to improve or worsen.  Please call with questions or concerns.

## 2021-06-04 NOTE — LETTER
2021         RE: Deepa Anderson  [address] [facility] [address]        Dear Colleague,    Thank you for referring your patient, Deepa Anderson, to the [facility]. Please see a copy of my visit note below.    Sports Medicine Clinic Visit    PCP: Kehr, Kristen M    CC: Patient presents with:  Left Knee - Pain      HPI:  Deepa Anderson is a 50 year old female who is seen as a self referral. She notes generalized left knee pain that started around 3 months ago without injury.  On 2021, she developed left knee pain and swelling medially, also without injury.   She rates the pain at a 9/10 at its worst and a 1/10 currently.  Symptoms are relieved with limiting activity. Notes that the swelling has decreased since 2021. She endorses swelling, popping, grinding and instability. She denies numbness and tingling. She notes difficulty with extending her leg and squatting. She sometimes feels that her knee is stuck in flexion.    She works as a .     History reviewed. No pertinent past surgical/medical/family/social history other than as mentioned in HPI.  Review of systems negative except per HPI.      Patient Active Problem List   Diagnosis     History of dysplastic nevus     Acne vulgaris     CARDIOVASCULAR SCREENING; LDL GOAL LESS THAN 160     Tubular adenoma of colon     Tobacco use disorder     Body mass index (BMI) of 40.0-44.9 in adult (H)     Hypertriglyceridemia     Gastroesophageal reflux disease, esophagitis presence not specified     Sedentary lifestyle     Menometrorrhagia     MELANI (generalized anxiety disorder)     Past Medical History:   Diagnosis Date     Actinic keratosis 2010    porokeratosis R hand     Diarrhea 2015     H. pylori infection 2014     Tear of triangular fibrocartilage of left wrist 2018     Past Surgical History:   Procedure Laterality Date     BIOPSY  2015    polyp removed       SECTION  2002     births      SECTION  2006     CHOLECYSTECTOMY  06/15/2016     COLONOSCOPY  2015     COMBINED ESOPHAGOSCOPY, GASTROSCOPY, DUODENOSCOPY (EGD) WITH CO2 INSUFFLATION N/A 2017    Procedure: COMBINED ESOPHAGOSCOPY, GASTROSCOPY, DUODENOSCOPY (EGD) WITH CO2 INSUFFLATION;  EGD-MASS OF NECK/ DELL;  Surgeon: Sohan Renee MD;  Location: [facility]     CYSTECTOMY PILONIDAL  2005     EXCISE MASS NECK Left 2017    Procedure: EXCISE MASS NECK;;  Surgeon: Rayray Morton MD;  Location: [facility]     LAPAROSCOPIC HYSTERECTOMY TOTAL N/A 2019    TLH with BL salpingectomy     LARYNGOSCOPY WITH BIOPSY(IES) N/A 2017    Procedure: LARYNGOSCOPY WITH BIOPSY(IES);  Direct laryngoscopy with biopsy and excision of left neck mass;  Surgeon: Rayray Morton MD;  Location: [facility]     TUBAL LIGATION       WRIST SURGERY Left 2018    TFCC repair, removal of bone chip at [facility] in [place]     Family History   Problem Relation Age of Onset     Arthritis Mother      Lipids Mother      Hypertension Mother      Obesity Mother      Thyroid Disease Mother      Diabetes Mother         Diet controlled     Cardiovascular Father      Hypertension Father      Cerebrovascular Disease Father      Hypertension Brother      Hypertension Brother      Cancer No family hx of      Glaucoma No family hx of      Macular Degeneration No family hx of      Diabetes No family hx of      Social History     Socioeconomic History     Marital status:      Spouse name: Not on file     Number of children: Not on file     Years of education: Not on file     Highest education level: Not on file   Occupational History     Occupation:      Employer: [employer]   Social Needs     Financial resource strain: Not on file     Food insecurity     Worry: Not on file     Inability: Not on file     Transportation needs     Medical: Not on file     Non-medical: Not on file   Tobacco Use      Smoking status: Light Tobacco Smoker     Packs/day: 0.50     Years: 25.00     Pack years: 12.50     Types: Cigarettes     Smokeless tobacco: Current User   Substance and Sexual Activity     Alcohol use: Yes     Frequency: 2-4 times a month     Comment: 3-4 drinks per week     Drug use: No     Sexual activity: Not Currently     Partners: Male     Birth control/protection: Female Surgical, None     Comment: Tubes tied 2006   Lifestyle     Physical activity     Days per week: Not on file     Minutes per session: Not on file     Stress: Not on file   Relationships     Social connections     Talks on phone: Not on file     Gets together: Not on file     Attends Druze service: Not on file     Active member of club or organization: Not on file     Attends meetings of clubs or organizations: Not on file     Relationship status: Not on file     Intimate partner violence     Fear of current or ex partner: Not on file     Emotionally abused: Not on file     Physically abused: Not on file     Forced sexual activity: Not on file   Other Topics Concern     Parent/sibling w/ CABG, MI or angioplasty before 65F 55M? No   Social History Narrative     Not on file         Current Outpatient Medications   Medication     citalopram (CELEXA) 20 MG tablet     loperamide (IMODIUM) 2 MG capsule     omeprazole (PRILOSEC) 20 MG DR capsule     No current facility-administered medications for this visit.      Allergies   Allergen Reactions     Percodan [Aspirin]      Percolone [Oxycodone]      Per PT had no problems with this medication         Objective:  /88 (BP Location: Right arm, Patient Position: Sitting, Cuff Size: Adult Regular)   Ht 1.829 m (6')   Wt 133.4 kg (294 lb)   LMP 05/06/2019 (Approximate)   BMI 39.87 kg/m      General: Alert and in no distress    Head: Normocephalic, atraumatic  Eyes: no scleral icterus or conjunctival erythema   Skin: no erythema, petechiae, or jaundice  CV: regular rhythm by palpation, 2+  distal pulses  Resp: normal respiratory effort without conversational dyspnea   Psych: normal mood and affect    Gait: Favoring left leg  Musculoskeletal:    Bilateral Knee exam    Inspection:  No erythema, ecchymosis, warmth, or edema    Palpation: No tenderness to palpation over the bilateral knees    Knee ROM: Full seated active knee extension and flexion bilaterally.  Crepitus with passive knee ROM.      Strength:    Left:  5/5 hip flexion/abduction/adduction, 5/5 knee extension/flexion, 5/5 ankle dorsiflexion/plantarflexion  Right:  5/5 hip flexion/abduction/adduction, 5/5 knee extension/flexion, 5/5 ankle dorsiflexion/plantarflexion    Sensation:  Intact to light touch in the bilateral lower extremities    Radiology:  X-rays ordered and independent visualization of images performed and reviewed with Deepa and her daughter, Lorie.  Recent Results (from the past 744 hour(s))   XR Knee Standing AP Bilat [place] Bilat Lat Left    Narrative    KNEE STANDING AP BILATERAL [place] BILATERAL LATERAL LEFT  6/4/2021  4:05 PM     HISTORY: Left knee pain    COMPARISON: None.      Impression    IMPRESSION:     Left knee: Mild medial compartment joint space narrowing. No evidence  of acute fracture or definite joint effusion.    Right knee: Mild-to-moderate patellofemoral degenerative changes.  Medial and lateral compartment joint spaces are preserved. No evidence  of fracture.    ERIC CARTER MD         Assessment:  1. Acute pain of left knee    2. Primary osteoarthritis of left knee        Plan:  Discussed the assessment with the patient and developed a plan together:  -Patient's preferred over the counter medication as directed on packaging as needed for pain or soreness.  -Ice 15-20 minutes for pain relief or swelling as needed.  -Physical therapy ordered at the [facility].  Please call [phone] to schedule.  Please do 5-6 days of exercises per week between formal sessions and the home  exercises they provide.  -Recommend low impact cardiovascular exercises (i.e. walking, using the elliptical, stationary biking, water aerobics, swimming)  -Discussed weight loss.  -Compression as needed for swelling.    -Also discussed steroid injection    -Follow up as needed if symptoms fail to improve or worsen.  Please call with questions or concerns.      Lynne Donovan MD, [facility]  [facility]        Again, thank you for allowing me to participate in the care of your patient.        Sincerely,        Bette Donovan MD

## 2021-07-15 ENCOUNTER — OFFICE VISIT (OUTPATIENT)
Dept: FAMILY MEDICINE | Facility: CLINIC | Age: 51
End: 2021-07-15
Payer: COMMERCIAL

## 2021-07-15 VITALS
BODY MASS INDEX: 39.6 KG/M2 | SYSTOLIC BLOOD PRESSURE: 122 MMHG | OXYGEN SATURATION: 96 % | TEMPERATURE: 98.9 F | HEART RATE: 69 BPM | WEIGHT: 292 LBS | DIASTOLIC BLOOD PRESSURE: 84 MMHG

## 2021-07-15 DIAGNOSIS — F41.8 SITUATIONAL ANXIETY: Primary | ICD-10-CM

## 2021-07-15 DIAGNOSIS — N95.1 PERIMENOPAUSE: ICD-10-CM

## 2021-07-15 PROCEDURE — 99214 OFFICE O/P EST MOD 30 MIN: CPT | Performed by: PHYSICIAN ASSISTANT

## 2021-07-15 RX ORDER — CITALOPRAM HYDROBROMIDE 20 MG/1
30 TABLET ORAL DAILY
Qty: 135 TABLET | Refills: 1 | Status: SHIPPED | OUTPATIENT
Start: 2021-07-15 | End: 2022-02-17

## 2021-07-15 ASSESSMENT — ANXIETY QUESTIONNAIRES
4. TROUBLE RELAXING: SEVERAL DAYS
GAD7 TOTAL SCORE: 5
5. BEING SO RESTLESS THAT IT IS HARD TO SIT STILL: SEVERAL DAYS
GAD7 TOTAL SCORE: 5
2. NOT BEING ABLE TO STOP OR CONTROL WORRYING: SEVERAL DAYS
7. FEELING AFRAID AS IF SOMETHING AWFUL MIGHT HAPPEN: SEVERAL DAYS
6. BECOMING EASILY ANNOYED OR IRRITABLE: NOT AT ALL
1. FEELING NERVOUS, ANXIOUS, OR ON EDGE: SEVERAL DAYS
GAD7 TOTAL SCORE: 5
3. WORRYING TOO MUCH ABOUT DIFFERENT THINGS: NOT AT ALL
7. FEELING AFRAID AS IF SOMETHING AWFUL MIGHT HAPPEN: SEVERAL DAYS
8. IF YOU CHECKED OFF ANY PROBLEMS, HOW DIFFICULT HAVE THESE MADE IT FOR YOU TO DO YOUR WORK, TAKE CARE OF THINGS AT HOME, OR GET ALONG WITH OTHER PEOPLE?: SOMEWHAT DIFFICULT

## 2021-07-15 ASSESSMENT — PATIENT HEALTH QUESTIONNAIRE - PHQ9
SUM OF ALL RESPONSES TO PHQ QUESTIONS 1-9: 13
10. IF YOU CHECKED OFF ANY PROBLEMS, HOW DIFFICULT HAVE THESE PROBLEMS MADE IT FOR YOU TO DO YOUR WORK, TAKE CARE OF THINGS AT HOME, OR GET ALONG WITH OTHER PEOPLE: NOT DIFFICULT AT ALL
SUM OF ALL RESPONSES TO PHQ QUESTIONS 1-9: 13

## 2021-07-15 ASSESSMENT — PAIN SCALES - GENERAL: PAINLEVEL: NO PAIN (0)

## 2021-07-15 NOTE — NURSING NOTE
Chief Complaint   Patient presents with     Depression     Anxiety       Initial BP (!) 161/82   Pulse 69   Temp 98.9  F (37.2  C) (Tympanic)   Wt 132.5 kg (292 lb)   LMP 05/06/2019 (Approximate)   SpO2 96%   BMI 39.60 kg/m   Estimated body mass index is 39.6 kg/m  as calculated from the following:    Height as of 6/4/21: 1.829 m (6').    Weight as of this encounter: 132.5 kg (292 lb).  Medication Reconciliation: maggie Cotto MA

## 2021-07-15 NOTE — PROGRESS NOTES
Assessment & Plan     Situational anxiety  Perimenopause  She is going to increase the citalopram to 30 mg daily.   Discussed possibly using hydroxyzine to help with sleep or situational panic symptoms, but she does not feel it is needed at this time. She has access to counseling. Discussed referral for mental health counseling if needed.   She will follow up via MyChart or virtual visit in 6 months, sooner if needed.   - citalopram (CELEXA) 20 MG tablet; Take 1.5 tablets (30 mg) by mouth daily    Body mass index (BMI) of 40.0-44.9 in adult (H)  Encouraged healthy habits. Walking will help with stress relief and also weight. She does not have much of an appetite at this time due to stress. Try to avoid stress eating               BMI:   Estimated body mass index is 39.6 kg/m  as calculated from the following:    Height as of 6/4/21: 1.829 m (6').    Weight as of this encounter: 132.5 kg (292 lb).   Weight management plan: Discussed healthy diet and exercise guidelines    Depression Screening Follow Up    PHQ 7/15/2021   PHQ-9 Total Score 13   Q9: Thoughts of better off dead/self-harm past 2 weeks Not at all     Last PHQ-9 7/15/2021   1.  Little interest or pleasure in doing things 3   2.  Feeling down, depressed, or hopeless 3   3.  Trouble falling or staying asleep, or sleeping too much 1   4.  Feeling tired or having little energy 3   5.  Poor appetite or overeating 3   6.  Feeling bad about yourself 0   7.  Trouble concentrating 0   8.  Moving slowly or restless 0   Q9: Thoughts of better off dead/self-harm past 2 weeks 0   PHQ-9 Total Score 13       Follow Up Actions Taken  Patient has experienced a recent significant life event.  Patient counseled, no additional follow up at this time.         No follow-ups on file.    Kristen M. Kehr, PA-C [facility]   Deepa is a 51 year old who presents for the following health issues     Deepa has been on the citalopram for years for  management of mood swings with perimenopause. She has had an increase in anxiety due to situational stressors. Her  was recently diagnosed with aggressive prostate cancer and started treatment.     History of Present Illness       Mental Health Follow-up:  Patient presents to follow-up on Depression & Anxiety.Patient's depression since last visit has been:  Medium  The patient is not having other symptoms associated with depression.  Patient's anxiety since last visit has been:  Medium  The patient is not having other symptoms associated with anxiety.  Any significant life events: health concerns  Patient is feeling anxious or having panic attacks.  Patient has no concerns about alcohol or drug use.     Social History  Tobacco Use    Smoking status: Light Tobacco Smoker      Packs/day: 0.50      Years: 25.00      Pack years: 12.5      Types: Cigarettes    Smokeless tobacco: Current User  Alcohol use: Yes    Comment: 3-4 drinks per week  Drug use: No      Today's PHQ-9         PHQ-9 Total Score:     (P) 13   PHQ-9 Q9 Thoughts of better off dead/self-harm past 2 weeks :   (P) Not at all   Thoughts of suicide or self harm:      Self-harm Plan:        Self-harm Action:          Safety concerns for self or others:                     Review of Systems   Constitutional, HEENT, cardiovascular, pulmonary, GI, , musculoskeletal, neuro, skin, endocrine and psych systems are negative, except as otherwise noted.      Objective    /84   Pulse 69   Temp 98.9  F (37.2  C) (Tympanic)   Wt 132.5 kg (292 lb)   LMP 05/06/2019 (Approximate)   SpO2 96%   BMI 39.60 kg/m    Body mass index is 39.6 kg/m .  Physical Exam   GENERAL: healthy, alert and no distress  PSYCH: mentation appears normal, affect normal/bright, judgement and insight intact and appearance well groomed

## 2021-07-16 ASSESSMENT — ANXIETY QUESTIONNAIRES: GAD7 TOTAL SCORE: 5

## 2021-07-16 ASSESSMENT — PATIENT HEALTH QUESTIONNAIRE - PHQ9: SUM OF ALL RESPONSES TO PHQ QUESTIONS 1-9: 13

## 2021-09-26 ENCOUNTER — HEALTH MAINTENANCE LETTER (OUTPATIENT)
Age: 51
End: 2021-09-26

## 2022-01-16 ENCOUNTER — HEALTH MAINTENANCE LETTER (OUTPATIENT)
Age: 52
End: 2022-01-16

## 2022-02-17 ENCOUNTER — VIRTUAL VISIT (OUTPATIENT)
Dept: FAMILY MEDICINE | Facility: CLINIC | Age: 52
End: 2022-02-17
Payer: COMMERCIAL

## 2022-02-17 DIAGNOSIS — N95.1 PERIMENOPAUSE: ICD-10-CM

## 2022-02-17 DIAGNOSIS — Z12.31 VISIT FOR SCREENING MAMMOGRAM: ICD-10-CM

## 2022-02-17 DIAGNOSIS — F41.8 SITUATIONAL ANXIETY: Primary | ICD-10-CM

## 2022-02-17 DIAGNOSIS — R12 HEARTBURN: ICD-10-CM

## 2022-02-17 PROCEDURE — 99213 OFFICE O/P EST LOW 20 MIN: CPT | Mod: 95 | Performed by: PHYSICIAN ASSISTANT

## 2022-02-17 RX ORDER — CITALOPRAM HYDROBROMIDE 20 MG/1
30 TABLET ORAL DAILY
Qty: 135 TABLET | Refills: 3 | Status: SHIPPED | OUTPATIENT
Start: 2022-02-17 | End: 2023-03-24

## 2022-02-17 ASSESSMENT — ANXIETY QUESTIONNAIRES
2. NOT BEING ABLE TO STOP OR CONTROL WORRYING: NOT AT ALL
7. FEELING AFRAID AS IF SOMETHING AWFUL MIGHT HAPPEN: NOT AT ALL
5. BEING SO RESTLESS THAT IT IS HARD TO SIT STILL: NOT AT ALL
6. BECOMING EASILY ANNOYED OR IRRITABLE: NOT AT ALL
3. WORRYING TOO MUCH ABOUT DIFFERENT THINGS: SEVERAL DAYS
GAD7 TOTAL SCORE: 2
1. FEELING NERVOUS, ANXIOUS, OR ON EDGE: SEVERAL DAYS

## 2022-02-17 ASSESSMENT — PATIENT HEALTH QUESTIONNAIRE - PHQ9
SUM OF ALL RESPONSES TO PHQ QUESTIONS 1-9: 3
5. POOR APPETITE OR OVEREATING: NOT AT ALL

## 2022-02-17 NOTE — PROGRESS NOTES
Deepa is a 51 year old who is being evaluated via a billable video visit.      How would you like to obtain your AVS? MyChart  If the video visit is dropped, the invitation should be resent by: Send to e-mail at: [email or web address]  Will anyone else be joining your video visit? No    Video Start Time: 9:35 AM    Assessment & Plan     Situational anxiety'  perimenopause  Stable, refills given   She is currently taking 30 mg daily and doing well.   - citalopram (CELEXA) 20 MG tablet; Take 1.5 tablets (30 mg) by mouth daily    Heartburn  Stable with the use of the omeprazole as needed.   - omeprazole (PRILOSEC) 20 MG DR capsule; Take 1 capsule (20 mg) by mouth daily    Visit for screening mammogram  - MA Screen Bilateral w/Carlos; Future    Body mass index (BMI) of 40.0-44.9 in adult (H)  Work on weight loss / health habits.       Reviewed health maintenance. She will schedule mammogram, otherwise up to date.   She declines vaccinations.          Tobacco Cessation:   reports that she has been smoking cigarettes. She has a 12.50 pack-year smoking history. She uses smokeless tobacco.      BMI:   Estimated body mass index is 39.6 kg/m  as calculated from the following:    Height as of 6/4/21: 1.829 m (6').    Weight as of 7/15/21: 132.5 kg (292 lb).   Weight management plan: continue healthy habits        Return in about 2 months (around 4/17/2022) for other / schedule mammogram.    Kristen M. Kehr, PA-C  [facility]   Deepa is a 51 year old who presents for the following health issues     HPI     Depression and Anxiety Follow-Up    How are you doing with your depression since your last visit? ok    How are you doing with your anxiety since your last visit?  ok    Are you having other symptoms that might be associated with depression or anxiety? No    Have you had a significant life event? Stress at work     Do you have any concerns with your use of alcohol or other drugs? No    Social  History     Tobacco Use     Smoking status: Light Tobacco Smoker     Packs/day: 0.50     Years: 25.00     Pack years: 12.50     Types: Cigarettes     Smokeless tobacco: Current User   Vaping Use     Vaping Use: Never used   Substance Use Topics     Alcohol use: Yes     Comment: 3-4 drinks per week     Drug use: No     PHQ 7/15/2021 2/17/2022   PHQ-9 Total Score 13 3   Q9: Thoughts of better off dead/self-harm past 2 weeks Not at all Not at all     MELANI-7 SCORE 7/15/2021 2/17/2022   Total Score 5 (mild anxiety) -   Total Score 5 2     Last PHQ-9 2/17/2022   1.  Little interest or pleasure in doing things 1   2.  Feeling down, depressed, or hopeless 0   3.  Trouble falling or staying asleep, or sleeping too much 0   4.  Feeling tired or having little energy 1   5.  Poor appetite or overeating 1   6.  Feeling bad about yourself 0   7.  Trouble concentrating 0   8.  Moving slowly or restless 0   Q9: Thoughts of better off dead/self-harm past 2 weeks 0   PHQ-9 Total Score 3     MELANI-7  2/17/2022   1. Feeling nervous, anxious, or on edge 1   2. Not being able to stop or control worrying 0   3. Worrying too much about different things 1   4. Trouble relaxing 0   5. Being so restless that it is hard to sit still 0   6. Becoming easily annoyed or irritable 0   7. Feeling afraid, as if something awful might happen 0   MELANI-7 Total Score 2       Suicide Assessment Five-step Evaluation and Treatment (SAFE-T)              Review of Systems   Constitutional, HEENT, cardiovascular, pulmonary, GI, , musculoskeletal, neuro, skin, endocrine and psych systems are negative, except as otherwise noted.      Objective           Vitals:  No vitals were obtained today due to virtual visit.    Physical Exam   GENERAL: Healthy, alert and no distress  EYES: Eyes grossly normal to inspection.  No discharge or erythema, or obvious scleral/conjunctival abnormalities.  RESP: No audible wheeze, cough, or visible cyanosis.  No visible retractions or  increased work of breathing.    SKIN: Visible skin clear. No significant rash, abnormal pigmentation or lesions.  NEURO: Cranial nerves grossly intact.  Mentation and speech appropriate for age.  PSYCH: Mentation appears normal, affect normal/bright, judgement and insight intact, normal speech and appearance well-groomed.                Video-Visit Details    Type of service:  Video Visit    Video End Time:10:03 AM    Originating Location (pt. Location): Home    Distant Location (provider location):  [facility]     Platform used for Video Visit: [employer]

## 2022-02-18 ASSESSMENT — ANXIETY QUESTIONNAIRES: GAD7 TOTAL SCORE: 2

## 2022-07-03 ENCOUNTER — HEALTH MAINTENANCE LETTER (OUTPATIENT)
Age: 52
End: 2022-07-03

## 2022-08-05 ENCOUNTER — ANCILLARY PROCEDURE (OUTPATIENT)
Dept: MAMMOGRAPHY | Facility: CLINIC | Age: 52
End: 2022-08-05
Payer: COMMERCIAL

## 2022-08-05 DIAGNOSIS — Z12.31 VISIT FOR SCREENING MAMMOGRAM: ICD-10-CM

## 2022-08-05 PROCEDURE — 77063 BREAST TOMOSYNTHESIS BI: CPT | Mod: TC | Performed by: RADIOLOGY

## 2022-08-05 PROCEDURE — 77067 SCR MAMMO BI INCL CAD: CPT | Mod: TC | Performed by: RADIOLOGY

## 2022-09-27 ENCOUNTER — OFFICE VISIT (OUTPATIENT)
Dept: FAMILY MEDICINE | Facility: CLINIC | Age: 52
End: 2022-09-27
Payer: COMMERCIAL

## 2022-09-27 VITALS
HEIGHT: 72 IN | OXYGEN SATURATION: 94 % | DIASTOLIC BLOOD PRESSURE: 72 MMHG | WEIGHT: 293 LBS | TEMPERATURE: 97.3 F | BODY MASS INDEX: 39.68 KG/M2 | HEART RATE: 68 BPM | SYSTOLIC BLOOD PRESSURE: 108 MMHG

## 2022-09-27 DIAGNOSIS — F41.8 SITUATIONAL ANXIETY: ICD-10-CM

## 2022-09-27 DIAGNOSIS — Z00.00 ROUTINE GENERAL MEDICAL EXAMINATION AT A HEALTH CARE FACILITY: Primary | ICD-10-CM

## 2022-09-27 DIAGNOSIS — E66.01 MORBID OBESITY (H): ICD-10-CM

## 2022-09-27 DIAGNOSIS — N95.1 PERIMENOPAUSE: ICD-10-CM

## 2022-09-27 DIAGNOSIS — F17.200 TOBACCO USE DISORDER: ICD-10-CM

## 2022-09-27 LAB
CHOLEST SERPL-MCNC: 213 MG/DL
FASTING STATUS PATIENT QL REPORTED: YES
FASTING STATUS PATIENT QL REPORTED: YES
GLUCOSE BLD-MCNC: 104 MG/DL (ref 70–99)
HDLC SERPL-MCNC: 60 MG/DL
LDLC SERPL CALC-MCNC: 127 MG/DL
NONHDLC SERPL-MCNC: 153 MG/DL
TRIGL SERPL-MCNC: 129 MG/DL

## 2022-09-27 PROCEDURE — 99214 OFFICE O/P EST MOD 30 MIN: CPT | Mod: 25 | Performed by: PHYSICIAN ASSISTANT

## 2022-09-27 PROCEDURE — 82947 ASSAY GLUCOSE BLOOD QUANT: CPT | Performed by: PHYSICIAN ASSISTANT

## 2022-09-27 PROCEDURE — 80061 LIPID PANEL: CPT | Performed by: PHYSICIAN ASSISTANT

## 2022-09-27 PROCEDURE — 36415 COLL VENOUS BLD VENIPUNCTURE: CPT | Performed by: PHYSICIAN ASSISTANT

## 2022-09-27 PROCEDURE — 99396 PREV VISIT EST AGE 40-64: CPT | Performed by: PHYSICIAN ASSISTANT

## 2022-09-27 RX ORDER — VARENICLINE TARTRATE 1 MG/1
1 TABLET, FILM COATED ORAL 2 TIMES DAILY
Qty: 60 TABLET | Refills: 1 | Status: SHIPPED | OUTPATIENT
Start: 2022-09-27

## 2022-09-27 ASSESSMENT — ENCOUNTER SYMPTOMS
EYE PAIN: 0
HEMATURIA: 0
CONSTIPATION: 0
FREQUENCY: 0
SHORTNESS OF BREATH: 0
HEARTBURN: 0
NAUSEA: 0
SORE THROAT: 0
MYALGIAS: 0
DIARRHEA: 0
NERVOUS/ANXIOUS: 0
PARESTHESIAS: 0
HEMATOCHEZIA: 0
COUGH: 0
FEVER: 0
DYSURIA: 0
ABDOMINAL PAIN: 0
CHILLS: 0
WEAKNESS: 0
ARTHRALGIAS: 0
JOINT SWELLING: 0
PALPITATIONS: 1
DIZZINESS: 0
HEADACHES: 0

## 2022-09-27 ASSESSMENT — PAIN SCALES - GENERAL: PAINLEVEL: NO PAIN (0)

## 2022-09-27 NOTE — PROGRESS NOTES
SUBJECTIVE:   CC: Deepa is an 52 year old who presents for preventive health visit.       Patient has been advised of split billing requirements and indicates understanding: Yes  Healthy Habits:     Getting at least 3 servings of Calcium per day:  Yes    Bi-annual eye exam:  Yes    Dental care twice a year:  NO    Sleep apnea or symptoms of sleep apnea:  Daytime drowsiness and Excessive snoring    Diet:  Regular (no restrictions) and Breakfast skipped    Frequency of exercise:  None    Taking medications regularly:  Yes    Medication side effects:  None    PHQ-2 Total Score: 1    Additional concerns today:  Yes    PROBLEMS TO ADD:  1. Tobacco use: she is interested in help to quit. She has used chantix in the past. She has vivid dreams on the medication, but wanting to try again.   2. Anxiety / perimenopause: she is doing well with the citalopram. She will need refills of her medications.     Today's PHQ-2 Score:   PHQ-2 ( 1999 [employer]) 9/27/2022   Q1: Little interest or pleasure in doing things 1   Q2: Feeling down, depressed or hopeless 0   PHQ-2 Score 1   PHQ-2 Total Score (12-17 Years)- Positive if 3 or more points; Administer PHQ-A if positive -   Q1: Little interest or pleasure in doing things Several days   Q2: Feeling down, depressed or hopeless Not at all   PHQ-2 Score 1       Abuse: Current or Past (Physical, Sexual or Emotional) - No  Do you feel safe in your environment? Yes    Have you ever done Advance Care Planning? (For example, a Health Directive, POLST, or a discussion with a medical provider or your loved ones about your wishes): No, advance care planning information given to patient to review.  Patient declined advance care planning discussion at this time.    Social History     Tobacco Use     Smoking status: Light Tobacco Smoker     Packs/day: 0.50     Years: 25.00     Pack years: 12.50     Types: Cigarettes     Smokeless tobacco: Current User   Substance Use Topics     Alcohol use: Yes      Comment: 3-4 drinks per week     If you drink alcohol do you typically have >3 drinks per day or >7 drinks per week? No    Alcohol Use 9/27/2022   Prescreen: >3 drinks/day or >7 drinks/week? Yes   Prescreen: >3 drinks/day or >7 drinks/week? -   AUDIT SCORE  5     AUDIT - Alcohol Use Disorders Identification Test - Reproduced from the World Health Organization Audit 2001 (Second Edition) 9/27/2022   1.  How often do you have a drink containing alcohol? 2 to 4 times a month   2.  How many drinks containing alcohol do you have on a typical day when you are drinking? 1 or 2   3.  How often do you have five or more drinks on one occasion? Weekly   4.  How often during the last year have you found that you were not able to stop drinking once you had started? Never   5.  How often during the last year have you failed to do what was normally expected of you because of drinking? Never   6.  How often during the last year have you needed a first drink in the morning to get yourself going after a heavy drinking session? Never   7.  How often during the last year have you had a feeling of guilt or remorse after drinking? Never   8.  How often during the last year have you been unable to remember what happened the night before because of your drinking? Never   9.  Have you or someone else been injured because of your drinking? No   10. Has a relative, friend, doctor or other health care worker been concerned about your drinking or suggested you cut down? No   TOTAL SCORE 5       Reviewed orders with patient.  Reviewed health maintenance and updated orders accordingly - Yes  BP Readings from Last 3 Encounters:   09/27/22 108/72   07/15/21 122/84   06/04/21 124/88    Wt Readings from Last 3 Encounters:   09/27/22 134.7 kg (297 lb)   07/15/21 132.5 kg (292 lb)   06/04/21 133.4 kg (294 lb)                  Patient Active Problem List   Diagnosis     History of dysplastic nevus     Acne vulgaris     CARDIOVASCULAR SCREENING; LDL GOAL  LESS THAN 160     Tubular adenoma of colon     Tobacco use disorder     Body mass index (BMI) of 40.0-44.9 in adult (H)     Hypertriglyceridemia     Gastroesophageal reflux disease, esophagitis presence not specified     Sedentary lifestyle     Menometrorrhagia     MELANI (generalized anxiety disorder)     Situational anxiety     Perimenopause     Heartburn     Past Surgical History:   Procedure Laterality Date     BIOPSY  2015    polyp removed      SECTION       births      SECTION  2006     CHOLECYSTECTOMY  06/15/2016     COLONOSCOPY  2015     COMBINED ESOPHAGOSCOPY, GASTROSCOPY, DUODENOSCOPY (EGD) WITH CO2 INSUFFLATION N/A 2017    Procedure: COMBINED ESOPHAGOSCOPY, GASTROSCOPY, DUODENOSCOPY (EGD) WITH CO2 INSUFFLATION;  EGD-MASS OF NECK/ DELL;  Surgeon: Sohan Renee MD;  Location: [facility]     CYSTECTOMY PILONIDAL  2005     EXCISE MASS NECK Left 2017    Procedure: EXCISE MASS NECK;;  Surgeon: Rayray Morton MD;  Location: [facility]     LAPAROSCOPIC HYSTERECTOMY TOTAL N/A 2019    TLH with BL salpingectomy     LARYNGOSCOPY WITH BIOPSY(IES) N/A 2017    Procedure: LARYNGOSCOPY WITH BIOPSY(IES);  Direct laryngoscopy with biopsy and excision of left neck mass;  Surgeon: Rayray Morton MD;  Location: [facility]     TUBAL LIGATION       WRIST SURGERY Left 2018    TFCC repair, removal of bone chip at [facility] in [place]       Social History     Tobacco Use     Smoking status: Light Tobacco Smoker     Packs/day: 0.50     Years: 25.00     Pack years: 12.50     Types: Cigarettes     Smokeless tobacco: Current User   Substance Use Topics     Alcohol use: Yes     Comment: 3-4 drinks per week     Family History   Problem Relation Age of Onset     Arthritis Mother      Lipids Mother      Hypertension Mother      Obesity Mother      Thyroid Disease Mother      Diabetes Mother         Diet controlled     Cardiovascular Father      Hypertension Father       Cerebrovascular Disease Father      Hypertension Brother      Hypertension Brother      Cancer No family hx of      Glaucoma No family hx of      Macular Degeneration No family hx of      Diabetes No family hx of          Current Outpatient Medications   Medication Sig Dispense Refill     citalopram (CELEXA) 20 MG tablet Take 1.5 tablets (30 mg) by mouth daily 135 tablet 3     loperamide (IMODIUM) 2 MG capsule Take 2 mg by mouth PRN       omeprazole (PRILOSEC) 20 MG DR capsule Take 1 capsule (20 mg) by mouth daily 90 capsule 3     varenicline (CHANTIX CHENCHO) 0.5 MG X 11 & 1 MG X 42 tablet Take 0.5 mg tab daily for 3 days, THEN 0.5 mg tab twice daily for 4 days, THEN 1 mg twice daily. 53 tablet 0     varenicline (CHANTIX) 1 MG tablet Take 1 tablet (1 mg) by mouth 2 times daily 60 tablet 1     Allergies   Allergen Reactions     Percodan [Aspirin]      Percolone [Oxycodone]      Per PT had no problems with this medication     Recent Labs   Lab Test 09/27/22  0953 06/10/19  1209 04/03/19  0813 01/31/18  0858 12/20/16  0934 06/29/16  0904 05/05/16  0925 05/26/15  0930   *  --   --  104* 103*  --   --  84   HDL 60  --   --  58 61  --   --  60   TRIG 129  --   --  160* 114  --   --  128   ALT  --   --  18  --   --  71* 13 14   CR  --   --   --   --   --  0.69  --  0.78   GFRESTIMATED  --   --   --   --   --  >90  Non  GFR Calc    --  81   GFRESTBLACK  --   --   --   --   --  >90  African American GFR Calc    --  >90   GFR Calc     POTASSIUM  --   --   --   --   --  4.4  --  3.8   TSH  --  2.10  --   --  1.59  --   --  1.96        Breast Cancer Screening:    FHS-7:   Breast CA Risk Assessment (FHS-7) 8/5/2022   Did any of your first-degree relatives have breast or ovarian cancer? No   Did any of your relatives have bilateral breast cancer? No   Did any man in your family have breast cancer? No   Did any woman in your family have breast and ovarian cancer? No   Did any woman in your  family have breast cancer before age 50 y? No   Do you have 2 or more relatives with breast and/or ovarian cancer? No   Do you have 2 or more relatives with breast and/or bowel cancer? No       Mammogram Screening: Recommended annual mammography  Pertinent mammograms are reviewed under the imaging tab.    History of abnormal Pap smear: Status post benign hysterectomy. Health Maintenance and Surgical History updated.  PAP / HPV Latest Ref Rng & Units 2018 2013 11/3/2010   PAP (Historical) - NIL NIL NIL   HPV16 NEG:Negative Negative - -   HPV18 NEG:Negative Negative - -   HRHPV NEG:Negative Negative - -     Reviewed and updated as needed this visit by clinical staff   Tobacco  Allergies  Meds   Med Hx  Surg Hx  Fam Hx  Soc Hx          Reviewed and updated as needed this visit by Provider                   Past Medical History:   Diagnosis Date     Actinic keratosis 2010    porokeratosis R hand     Diarrhea 2015     H. pylori infection 2014     Tear of triangular fibrocartilage of left wrist 2018      Past Surgical History:   Procedure Laterality Date     BIOPSY  2015    polyp removed      SECTION       births      SECTION  2006     CHOLECYSTECTOMY  06/15/2016     COLONOSCOPY  2015     COMBINED ESOPHAGOSCOPY, GASTROSCOPY, DUODENOSCOPY (EGD) WITH CO2 INSUFFLATION N/A 2017    Procedure: COMBINED ESOPHAGOSCOPY, GASTROSCOPY, DUODENOSCOPY (EGD) WITH CO2 INSUFFLATION;  EGD-MASS OF NECK/ DELL;  Surgeon: Sohan Renee MD;  Location: [facility]     CYSTECTOMY PILONIDAL  2005     EXCISE MASS NECK Left 2017    Procedure: EXCISE MASS NECK;;  Surgeon: Rayray Morton MD;  Location: [facility]     LAPAROSCOPIC HYSTERECTOMY TOTAL N/A 2019    TLH with BL salpingectomy     LARYNGOSCOPY WITH BIOPSY(IES) N/A 2017    Procedure: LARYNGOSCOPY WITH BIOPSY(IES);  Direct laryngoscopy with biopsy and excision of left neck mass;  Surgeon: Praveen  "Rayray Dey MD;  Location: [facility]     TUBAL LIGATION  2006     WRIST SURGERY Left 2018    TFCC repair, removal of bone chip at [facility] in [place]     OB History    Para Term  AB Living   2 2 2 0 0 2   SAB IAB Ectopic Multiple Live Births   0 0 0 0 2      # Outcome Date GA Lbr Rodger/2nd Weight Sex Delivery Anes PTL Lv   2 Term 06    M -SEC   DOROTA   1 Term 02    F -SEC   DOROTA       Review of Systems   Constitutional: Negative for chills and fever.   HENT: Negative for congestion, ear pain, hearing loss and sore throat.    Eyes: Negative for pain and visual disturbance.   Respiratory: Negative for cough and shortness of breath.    Cardiovascular: Positive for palpitations. Negative for chest pain and peripheral edema.   Gastrointestinal: Negative for abdominal pain, constipation, diarrhea, heartburn, hematochezia and nausea.   Genitourinary: Negative for dysuria, frequency, genital sores, hematuria and urgency.   Musculoskeletal: Negative for arthralgias, joint swelling and myalgias.   Skin: Negative for rash.   Neurological: Negative for dizziness, weakness, headaches and paresthesias.   Psychiatric/Behavioral: Negative for mood changes. The patient is not nervous/anxious.           OBJECTIVE:   /72   Pulse 68   Temp 97.3  F (36.3  C) (Tympanic)   Ht 1.816 m (5' 11.5\")   Wt 134.7 kg (297 lb)   LMP 2019 (Approximate)   SpO2 94%   BMI 40.85 kg/m    Physical Exam  GENERAL APPEARANCE: healthy, alert and no distress  EYES: Eyes grossly normal to inspection, PERRL and conjunctivae and sclerae normal  HENT: ear canals and TM's normal, nose and mouth without ulcers or lesions, oropharynx clear and oral mucous membranes moist  NECK: no adenopathy, no asymmetry, masses, or scars and thyroid normal to palpation  RESP: lungs clear to auscultation - no rales, rhonchi or wheezes  BREAST: normal without masses, tenderness or nipple discharge and no palpable axillary masses " "or adenopathy  CV: regular rate and rhythm, normal S1 S2, no S3 or S4, no murmur, click or rub, no peripheral edema and peripheral pulses strong  ABDOMEN: soft, nontender, no hepatosplenomegaly, no masses and bowel sounds normal  MS: no musculoskeletal defects are noted and gait is age appropriate without ataxia  SKIN: no suspicious lesions or rashes  NEURO: Normal strength and tone, sensory exam grossly normal, mentation intact and speech normal  PSYCH: mentation appears normal and affect normal/bright    Diagnostic Test Results:  Labs reviewed in Epic    ASSESSMENT/PLAN:   (Z00.00) Routine general medical examination at a health care facility  (primary encounter diagnosis)  Comment: Health maintenance reviewed and updated.  Plan: Lipid panel reflex to direct LDL Fasting,         Glucose            (F17.200) Tobacco use disorder  She will try the chantix once again. She is familiar with side effects and how to take the medication. Notify if any concerns.   Plan: varenicline (CHANTIX CHENCHO) 0.5 MG X 11 & 1 MG X         42 tablet, varenicline (CHANTIX) 1 MG tablet            (F41.8) Situational anxiety  (N95.1) Perimenopause  She is doing well with the citalopram and already has refills of her medication. Citalopram 30 mg daily    (Z68.41) Body mass index (BMI) of 40.0-44.9 in adult (H)  (E66.01) Morbid obesity (H)  Comment: stress level is better, her 's cancer is in remission. She is wanting to concentrate on her health. Referral placed for weight loss management clinic.   Plan: Comprehensive Weight Management              Patient has been advised of split billing requirements and indicates understanding: Yes    COUNSELING:  Reviewed preventive health counseling, as reflected in patient instructions       Regular exercise       Healthy diet/nutrition    Estimated body mass index is 40.85 kg/m  as calculated from the following:    Height as of this encounter: 1.816 m (5' 11.5\").    Weight as of this encounter: " 134.7 kg (297 lb).    Weight management plan: see above    She reports that she has been smoking cigarettes. She has a 12.50 pack-year smoking history. She uses smokeless tobacco.  Nicotine/Tobacco Cessation Plan:   Pharmacotherapies : varenicline (Chantix)      Counseling Resources:  ATP IV Guidelines  Pooled Cohorts Equation Calculator  Breast Cancer Risk Calculator  BRCA-Related Cancer Risk Assessment: FHS-7 Tool  FRAX Risk Assessment  ICSI Preventive Guidelines  Dietary Guidelines for Americans, 2010  USDA's MyPlate  ASA Prophylaxis  Lung CA Screening    Kristen M. Kehr, PA-C [facility]

## 2023-02-08 NOTE — PROGRESS NOTES
Chief Complaint - left tongue swelling    History of Present Illness - Deepa Anderson is a 52 year old female with swelling of the left tongue, maybe salivary gland. Gets a tickle in the tongue. It waxes and wanes. No pain. Worse at night when she lies down. Not worse with eating. 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 She is status post direct laryngoscopy and left neck level 2 mass excision on 6/29/2017. The patient does smoke. The patient has tried a q-tip.     Tests personally reviewed today for this visit:   1.) glucose 104 9/27/22  2.) lipid panel 9/27/22 shows elevated cholesterol and LDL    Past Medical History -   Patient Active Problem List   Diagnosis     History of dysplastic nevus     Acne vulgaris     CARDIOVASCULAR SCREENING; LDL GOAL LESS THAN 160     Tubular adenoma of colon     Tobacco use disorder     Body mass index (BMI) of 40.0-44.9 in adult (H)     Hypertriglyceridemia     Gastroesophageal reflux disease, esophagitis presence not specified     Sedentary lifestyle     Menometrorrhagia     MELANI (generalized anxiety disorder)     Situational anxiety     Perimenopause     Heartburn       Current Medications -   Current Outpatient Medications:      citalopram (CELEXA) 20 MG tablet, Take 1.5 tablets (30 mg) by mouth daily, Disp: 135 tablet, Rfl: 3     loperamide (IMODIUM) 2 MG capsule, Take 2 mg by mouth PRN, Disp: , Rfl:      omeprazole (PRILOSEC) 20 MG DR capsule, Take 1 capsule (20 mg) by mouth daily, Disp: 90 capsule, Rfl: 3     varenicline (CHANTIX CHENCHO) 0.5 MG X 11 & 1 MG X 42 tablet, Take 0.5 mg tab daily for 3 days, THEN 0.5 mg tab twice daily for 4 days, THEN 1 mg twice daily., Disp: 53 tablet, Rfl: 0     varenicline (CHANTIX) 1 MG tablet, Take 1 tablet (1 mg) by mouth 2 times daily, Disp: 60 tablet, Rfl: 1    Allergies -   Allergies   Allergen Reactions     Acetaminophen      Percodan [Aspirin]      Percolone [Oxycodone]      Per PT had  no problems with this medication       Social History -   Social History     Socioeconomic History     Marital status:    Occupational History     Occupation:      Employer: [employer]   Tobacco Use     Smoking status: Light Smoker     Packs/day: 0.50     Years: 25.00     Pack years: 12.50     Types: Cigarettes     Smokeless tobacco: Current   Vaping Use     Vaping Use: Never used   Substance and Sexual Activity     Alcohol use: Yes     Comment: 3-4 drinks per week     Drug use: No     Sexual activity: Not Currently     Partners: Male     Birth control/protection: Female Surgical, None     Comment: Tubes tied 2006   Other Topics Concern     Parent/sibling w/ CABG, MI or angioplasty before 65F 55M? No       Family History -   Family History   Problem Relation Age of Onset     Arthritis Mother      Lipids Mother      Hypertension Mother      Obesity Mother      Thyroid Disease Mother      Diabetes Mother         Diet controlled     Cardiovascular Father      Hypertension Father      Cerebrovascular Disease Father      Hypertension Brother      Hypertension Brother      Cancer No family hx of      Glaucoma No family hx of      Macular Degeneration No family hx of      Diabetes No family hx of      Review of Systems - As per HPI and PMHx, otherwise 7 system review of the head and neck is negative.    Physical Exam  General - The patient is in no distress.  Alert and oriented x3, answers questions and cooperates with examination appropriately.   Voice and Breathing - The patient was breathing comfortably without the use of accessory muscles. There was no wheezing, stridor, or stertor.  The patients voice was clear and strong.   Eyes - Extraocular movements intact. Sclera were not icteric or injected, conjunctiva were pink and moist.  Neurologic - Cranial nerves II-XII are grossly intact. Specifically, the facial nerve is intact, House-Brackmann grade 1 of 6. Facial sensation is intact and  symmetric.  Nose - No significant external deformity.  Nasal mucosa is pink and moist with no abnormal mucus.  The septum was midline, turbinates are of normal size and position.  No polyps, masses, or purulence.  Mouth - Examination of the oral cavity showed pink, healthy oral mucosa. No lesions or ulcerations noted.  The tongue was mobile and protrudes midline.  Oropharynx - The walls of the oropharynx were smooth, symmetric, and had no lesions or ulcerations. The tonsils were 2+, some white debri left superior pole, possibly an impacted stone, but I couldn't obviously see the stone or remove anything. The uvula was midline and the palate raised symmetrically.   Neck - submandibular glands are normal. No overlying skin changes. No tenderness. The occipital, submental, submandibular, internal jugular chain, and supraclavicular nodes did not demonstrate any abnormal lymph nodes or masses. Palpation of the thyroid was soft and smooth, with no nodules or goiter appreciated.  The trachea was midline.        A/P - Nadsilvana Anderson is a 52 year old female with a foreign body feeling left tonsil fossa. Likely a tonsil stone. I see no mass. No pain or bleeding. We discussed a water pick.  Return if this fails or things worsen.  She is a smoker but do not see any concerning findings of malignancy.    Rayray Morton MD  Otolaryngology  [facility]

## 2023-02-09 ENCOUNTER — OFFICE VISIT (OUTPATIENT)
Dept: OTOLARYNGOLOGY | Facility: CLINIC | Age: 53
End: 2023-02-09
Payer: COMMERCIAL

## 2023-02-09 VITALS
OXYGEN SATURATION: 97 % | RESPIRATION RATE: 18 BRPM | HEART RATE: 76 BPM | SYSTOLIC BLOOD PRESSURE: 136 MMHG | DIASTOLIC BLOOD PRESSURE: 82 MMHG

## 2023-02-09 DIAGNOSIS — J35.8 TONSIL STONE: Primary | ICD-10-CM

## 2023-02-09 PROCEDURE — 99203 OFFICE O/P NEW LOW 30 MIN: CPT | Performed by: OTOLARYNGOLOGY

## 2023-02-09 NOTE — LETTER
2/9/2023         RE: Deepa Anderson  [address] [facility] [id number]        Dear Colleague,    Thank you for referring your patient, Deepa Anderson, to the [facility]. Please see a copy of my visit note below.    Chief Complaint - left tongue swelling    History of Present Illness - Deepa Anderson is a 52 year old female with swelling of the left tongue, maybe salivary gland. Gets a tickle in the tongue. It waxes and wanes. No pain. Worse at night when she lies down. Not worse with eating. 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 She is status post direct laryngoscopy and left neck level 2 mass excision on 6/29/2017. The patient does smoke. The patient has tried a q-tip.     Tests personally reviewed today for this visit:   1.) glucose 104 9/27/22  2.) lipid panel 9/27/22 shows elevated cholesterol and LDL    Past Medical History -   Patient Active Problem List   Diagnosis     History of dysplastic nevus     Acne vulgaris     CARDIOVASCULAR SCREENING; LDL GOAL LESS THAN 160     Tubular adenoma of colon     Tobacco use disorder     Body mass index (BMI) of 40.0-44.9 in adult (H)     Hypertriglyceridemia     Gastroesophageal reflux disease, esophagitis presence not specified     Sedentary lifestyle     Menometrorrhagia     MELANI (generalized anxiety disorder)     Situational anxiety     Perimenopause     Heartburn       Current Medications -   Current Outpatient Medications:      citalopram (CELEXA) 20 MG tablet, Take 1.5 tablets (30 mg) by mouth daily, Disp: 135 tablet, Rfl: 3     loperamide (IMODIUM) 2 MG capsule, Take 2 mg by mouth PRN, Disp: , Rfl:      omeprazole (PRILOSEC) 20 MG DR capsule, Take 1 capsule (20 mg) by mouth daily, Disp: 90 capsule, Rfl: 3     varenicline (CHANTIX CHENCHO) 0.5 MG X 11 & 1 MG X 42 tablet, Take 0.5 mg tab daily for 3 days, THEN 0.5 mg tab twice daily for 4 days, THEN 1 mg twice daily., Disp: 53  tablet, Rfl: 0     varenicline (CHANTIX) 1 MG tablet, Take 1 tablet (1 mg) by mouth 2 times daily, Disp: 60 tablet, Rfl: 1    Allergies -   Allergies   Allergen Reactions     Acetaminophen      Percodan [Aspirin]      Percolone [Oxycodone]      Per PT had no problems with this medication       Social History -   Social History     Socioeconomic History     Marital status:    Occupational History     Occupation:      Employer: [employer]   Tobacco Use     Smoking status: Light Smoker     Packs/day: 0.50     Years: 25.00     Pack years: 12.50     Types: Cigarettes     Smokeless tobacco: Current   Vaping Use     Vaping Use: Never used   Substance and Sexual Activity     Alcohol use: Yes     Comment: 3-4 drinks per week     Drug use: No     Sexual activity: Not Currently     Partners: Male     Birth control/protection: Female Surgical, None     Comment: Tubes tied 2006   Other Topics Concern     Parent/sibling w/ CABG, MI or angioplasty before 65F 55M? No       Family History -   Family History   Problem Relation Age of Onset     Arthritis Mother      Lipids Mother      Hypertension Mother      Obesity Mother      Thyroid Disease Mother      Diabetes Mother         Diet controlled     Cardiovascular Father      Hypertension Father      Cerebrovascular Disease Father      Hypertension Brother      Hypertension Brother      Cancer No family hx of      Glaucoma No family hx of      Macular Degeneration No family hx of      Diabetes No family hx of      Review of Systems - As per HPI and PMHx, otherwise 7 system review of the head and neck is negative.    Physical Exam  General - The patient is in no distress.  Alert and oriented x3, answers questions and cooperates with examination appropriately.   Voice and Breathing - The patient was breathing comfortably without the use of accessory muscles. There was no wheezing, stridor, or stertor.  The patients voice was clear and strong.   Eyes -  Extraocular movements intact. Sclera were not icteric or injected, conjunctiva were pink and moist.  Neurologic - Cranial nerves II-XII are grossly intact. Specifically, the facial nerve is intact, House-Brackmann grade 1 of 6. Facial sensation is intact and symmetric.  Nose - No significant external deformity.  Nasal mucosa is pink and moist with no abnormal mucus.  The septum was midline, turbinates are of normal size and position.  No polyps, masses, or purulence.  Mouth - Examination of the oral cavity showed pink, healthy oral mucosa. No lesions or ulcerations noted.  The tongue was mobile and protrudes midline.  Oropharynx - The walls of the oropharynx were smooth, symmetric, and had no lesions or ulcerations. The tonsils were 2+, some white debri left superior pole, possibly an impacted stone, but I couldn't obviously see the stone or remove anything. The uvula was midline and the palate raised symmetrically.   Neck - submandibular glands are normal. No overlying skin changes. No tenderness. The occipital, submental, submandibular, internal jugular chain, and supraclavicular nodes did not demonstrate any abnormal lymph nodes or masses. Palpation of the thyroid was soft and smooth, with no nodules or goiter appreciated.  The trachea was midline.        A/P - Deepa Anderson is a 52 year old female with a foreign body feeling left tonsil fossa. Likely a tonsil stone. I see no mass. No pain or bleeding. We discussed a water pick.  Return if this fails or things worsen.  She is a smoker but do not see any concerning findings of malignancy.    Rayray Morton MD  Otolaryngology  [facility]        Again, thank you for allowing me to participate in the care of your patient.        Sincerely,        Rayray Morton MD

## 2023-03-31 ASSESSMENT — SLEEP AND FATIGUE QUESTIONNAIRES
HOW LIKELY ARE YOU TO NOD OFF OR FALL ASLEEP WHILE LYING DOWN TO REST IN THE AFTERNOON WHEN CIRCUMSTANCES PERMIT: SLIGHT CHANCE OF DOZING
HOW LIKELY ARE YOU TO NOD OFF OR FALL ASLEEP WHILE SITTING INACTIVE IN A PUBLIC PLACE: WOULD NEVER DOZE
HOW LIKELY ARE YOU TO NOD OFF OR FALL ASLEEP IN A CAR, WHILE STOPPED FOR A FEW MINUTES IN TRAFFIC: WOULD NEVER DOZE
HOW LIKELY ARE YOU TO NOD OFF OR FALL ASLEEP WHEN YOU ARE A PASSENGER IN A CAR FOR AN HOUR WITHOUT A BREAK: WOULD NEVER DOZE
HOW LIKELY ARE YOU TO NOD OFF OR FALL ASLEEP WHILE SITTING QUIETLY AFTER LUNCH WITHOUT ALCOHOL: WOULD NEVER DOZE
HOW LIKELY ARE YOU TO NOD OFF OR FALL ASLEEP WHILE SITTING AND TALKING TO SOMEONE: WOULD NEVER DOZE
HOW LIKELY ARE YOU TO NOD OFF OR FALL ASLEEP WHILE WATCHING TV: SLIGHT CHANCE OF DOZING
HOW LIKELY ARE YOU TO NOD OFF OR FALL ASLEEP WHILE SITTING AND READING: MODERATE CHANCE OF DOZING

## 2023-04-04 ENCOUNTER — OFFICE VISIT (OUTPATIENT)
Dept: SURGERY | Facility: CLINIC | Age: 53
End: 2023-04-04
Attending: PHYSICIAN ASSISTANT
Payer: COMMERCIAL

## 2023-04-04 ENCOUNTER — LAB (OUTPATIENT)
Dept: LAB | Facility: CLINIC | Age: 53
End: 2023-04-04
Payer: COMMERCIAL

## 2023-04-04 ENCOUNTER — TELEPHONE (OUTPATIENT)
Dept: SURGERY | Facility: CLINIC | Age: 53
End: 2023-04-04

## 2023-04-04 VITALS — WEIGHT: 293 LBS | HEIGHT: 71 IN | BODY MASS INDEX: 41.02 KG/M2

## 2023-04-04 DIAGNOSIS — M19.042 PRIMARY OSTEOARTHRITIS OF BOTH HANDS: ICD-10-CM

## 2023-04-04 DIAGNOSIS — E66.01 MORBID OBESITY (H): ICD-10-CM

## 2023-04-04 DIAGNOSIS — R73.01 IMPAIRED FASTING GLUCOSE: ICD-10-CM

## 2023-04-04 DIAGNOSIS — M54.50 LOW BACK PAIN, UNSPECIFIED BACK PAIN LATERALITY, UNSPECIFIED CHRONICITY, UNSPECIFIED WHETHER SCIATICA PRESENT: ICD-10-CM

## 2023-04-04 DIAGNOSIS — E66.01 MORBID OBESITY WITH BMI OF 40.0-44.9, ADULT (H): Primary | ICD-10-CM

## 2023-04-04 DIAGNOSIS — M19.041 PRIMARY OSTEOARTHRITIS OF BOTH HANDS: ICD-10-CM

## 2023-04-04 PROBLEM — M25.562 LEFT KNEE PAIN: Status: ACTIVE | Noted: 2023-04-04

## 2023-04-04 PROBLEM — M25.569 KNEE PAIN: Status: ACTIVE | Noted: 2023-04-04

## 2023-04-04 LAB
ALBUMIN SERPL BCG-MCNC: 4.3 G/DL (ref 3.5–5.2)
ALP SERPL-CCNC: 99 U/L (ref 35–104)
ALT SERPL W P-5'-P-CCNC: 13 U/L (ref 10–35)
ANION GAP SERPL CALCULATED.3IONS-SCNC: 11 MMOL/L (ref 7–15)
AST SERPL W P-5'-P-CCNC: 23 U/L (ref 10–35)
BILIRUB SERPL-MCNC: 0.5 MG/DL
BUN SERPL-MCNC: 10.9 MG/DL (ref 6–20)
CALCIUM SERPL-MCNC: 9.8 MG/DL (ref 8.6–10)
CHLORIDE SERPL-SCNC: 103 MMOL/L (ref 98–107)
CREAT SERPL-MCNC: 0.84 MG/DL (ref 0.51–0.95)
DEPRECATED CALCIDIOL+CALCIFEROL SERPL-MC: 11 UG/L (ref 20–75)
DEPRECATED HCO3 PLAS-SCNC: 25 MMOL/L (ref 22–29)
ERYTHROCYTE [DISTWIDTH] IN BLOOD BY AUTOMATED COUNT: 12.8 % (ref 10–15)
FERRITIN SERPL-MCNC: 89 NG/ML (ref 6–175)
GFR SERPL CREATININE-BSD FRML MDRD: 84 ML/MIN/1.73M2
GLUCOSE SERPL-MCNC: 99 MG/DL (ref 70–99)
HBA1C MFR BLD: 5.5 % (ref 0–5.6)
HCT VFR BLD AUTO: 43.6 % (ref 35–47)
HGB BLD-MCNC: 14.6 G/DL (ref 11.7–15.7)
MCH RBC QN AUTO: 30.5 PG (ref 26.5–33)
MCHC RBC AUTO-ENTMCNC: 33.5 G/DL (ref 31.5–36.5)
MCV RBC AUTO: 91 FL (ref 78–100)
PLATELET # BLD AUTO: 262 10E3/UL (ref 150–450)
POTASSIUM SERPL-SCNC: 4.8 MMOL/L (ref 3.4–5.3)
PROT SERPL-MCNC: 7.3 G/DL (ref 6.4–8.3)
PTH-INTACT SERPL-MCNC: 53 PG/ML (ref 15–65)
RBC # BLD AUTO: 4.78 10E6/UL (ref 3.8–5.2)
SODIUM SERPL-SCNC: 139 MMOL/L (ref 136–145)
TSH SERPL DL<=0.005 MIU/L-ACNC: 1.44 UIU/ML (ref 0.3–4.2)
VIT B12 SERPL-MCNC: 386 PG/ML (ref 232–1245)
WBC # BLD AUTO: 6.6 10E3/UL (ref 4–11)

## 2023-04-04 PROCEDURE — 82306 VITAMIN D 25 HYDROXY: CPT

## 2023-04-04 PROCEDURE — 82728 ASSAY OF FERRITIN: CPT

## 2023-04-04 PROCEDURE — 99215 OFFICE O/P EST HI 40 MIN: CPT | Performed by: FAMILY MEDICINE

## 2023-04-04 PROCEDURE — 36415 COLL VENOUS BLD VENIPUNCTURE: CPT

## 2023-04-04 PROCEDURE — 80053 COMPREHEN METABOLIC PANEL: CPT

## 2023-04-04 PROCEDURE — 84443 ASSAY THYROID STIM HORMONE: CPT

## 2023-04-04 PROCEDURE — 83036 HEMOGLOBIN GLYCOSYLATED A1C: CPT

## 2023-04-04 PROCEDURE — 83970 ASSAY OF PARATHORMONE: CPT

## 2023-04-04 PROCEDURE — 85027 COMPLETE CBC AUTOMATED: CPT

## 2023-04-04 PROCEDURE — 82607 VITAMIN B-12: CPT

## 2023-04-04 RX ORDER — SEMAGLUTIDE 1 MG/.5ML
1 INJECTION, SOLUTION SUBCUTANEOUS WEEKLY
Qty: 2 ML | Refills: 0 | Status: SHIPPED | OUTPATIENT
Start: 2023-04-04 | End: 2023-05-02

## 2023-04-04 RX ORDER — SEMAGLUTIDE 0.5 MG/.5ML
0.5 INJECTION, SOLUTION SUBCUTANEOUS WEEKLY
Qty: 2 ML | Refills: 0 | Status: SHIPPED | OUTPATIENT
Start: 2023-04-04 | End: 2023-05-02

## 2023-04-04 RX ORDER — SEMAGLUTIDE 0.25 MG/.5ML
0.25 INJECTION, SOLUTION SUBCUTANEOUS WEEKLY
Qty: 2 ML | Refills: 0 | Status: SHIPPED | OUTPATIENT
Start: 2023-04-04 | End: 2023-05-02

## 2023-04-04 RX ORDER — PHENTERMINE HYDROCHLORIDE 37.5 MG/1
18.75-37.5 TABLET ORAL
Qty: 90 TABLET | Refills: 1 | Status: SHIPPED | OUTPATIENT
Start: 2023-04-04 | End: 2023-07-03

## 2023-04-04 NOTE — PATIENT INSTRUCTIONS
[facility] Bariat[facility] Basics    Remember to: look up You Tube video [facility] on DASH diet    -Eat 3 meals a day (not 2, not 5) Chew your food well/SLOW down  -Eat your protein first  -Be a water drinker/Minize liquid calories (no regular pop, no juice) skim or 1% milk OK  -Sleep 7-8 hours each night. Address sleep if problematic  -Stress management is important. Address if problematic  -Move-8000 steps daily Muscle: maintain your muscle mass (strength training 2X/wk)  -Wheat, not white (bread, pasta, crackers, regan, bagels, tortillas, rice)  -Limit restaurant, cafeteria, take out, drive through to 2 times per week or less  -Minimize caffeine, alcohol, and night-time snacking  -Consider keeping a food diary (i.e. My Fitness Pal, Lose It, or other food tracker)  -Follow up with the dietitian      **Some lean proteins: chicken, turkey, tuna, salmon, crab, fish, shrimp, scallops, lobster, lean cuts of beef and pork, luncheon meats, veggie burgers, beans (black, lima, garbanzo, dejesus, kidney, refried), chile, cottage cheese, string cheese, other cheese, eggs, tofu, peanut butter, nuts, vegan crumbles, greek yogurt     MEDICATIONS FOR WEIGHT LOSS    PHENTERMINE (Adipex): approved in 1959 for appetite suppression.  It has stimulant effects and cannot be used with Ritalin, Concerta, or other stimulants.  It is not addictive although it's chemically related to amphetamines.  Amphetamines are addictive. The most common side effects are dry mouth, increased energy and concentration, increased pulse, and constipation.  You should not take phentermine if you have glaucoma, hyperthyroidism, or uncontrolled/untreated hypertension.  $24-$30 for 90 tablets    PHENDIMETRAZINE (Bontril): Appetite suppressant/sympathomimetic.  Controlled substance.  Side effects and contraindications similar to phentermine.  $45-$60 for 3 month supply    TOPIRAMATE (Topamax): Anti-seizure medication, also used to prevent migraines.   Side effects include paresthesia, glaucoma, altered concentration, attention difficulties, memory and speech problems, metabolic acidosis, depression, increase in body temperature and decrease sweating, kidney stones, and weight loss.  Do not take Topamax while taking Depakote as this can cause high ammonia levels.  You must have reliable birth control as Topamax can cause birth defects.  Discontinue slowly to avoid seizure.  Insurance usually covers Topiramate.    QSYMIA (Phentermine + Topamax):  See above information about phentermine and Topamax.  Most common side effects are paresthesia, dizziness, distortion of taste, insomnia, constipation, and dry mouth.  $150-$220 per month    DIETHYLPROPION (Tenuate): Sympathomimetic amine.  Appetite suppressant.  Doses 25 mg before meals or 75 mg per day.  Most common side effects are hypertension, palpitations, EKG changes, and increased seizures in epileptics.  There can be a possible adverse reaction with alcohol.  $70-$90 per 3 months    XENICAL(Orlistat) (Jesus Manuel OTC): Approved in 1999.  A fat-blocker.  It reduces absorption of fat by approximately 30%.  It has beneficial effects on lipid levels.  Side effects include diarrhea, abdominal cramping, fecal incontinence, oily spotting, and flatus with discharge.  Side effects are minimized if the patient limits their dietary fat to no more than 30% of their diet.  Patients must take a multivitamin daily to avoid vitamin D, E, A, and K deficiency.  $120 per month    CONTRAVE (Naltrexone/Bupropion): Approved in 2014.  It is a combination pill including an opioid receptor blocker and a long-standing antidepressant.  Most common side effects include nausea, constipation, headache, vomiting, dizziness, trouble sleeping, dry mouth, and diarrhea.  With all antidepressants watch for mood changes and suicide ideation.  Bupropion has been known to lower the seizure threshold in those prone to seizures.  It should not be used in a  patient with a recent history of bulimia. It has been associated with liver damage from taking higher than recommended doses.  Do not use countrave if you have taken opioid medications or opioid street drugs in the past 7-10 days, if you are currently on opioids, methadone, or if you are pregnant.  Do not use contrave if you have recently stopped using alcohol or benzodiazepines.  Taper off contrave slowly.  Dosing: titrate up to 2 tablets twice daily of the Naltrexone 8 mg/ Bupropion 90 mg tablets.  $200 for 90 tablets    SAXENDA (Liraglutide): A daily injectable (3mg daily) medication used for type 2 diabetes (Victoza). Glucagon-like peptide-1 (GLP-1) agonist. Contraindications include personal or family history of medullary thyroid cancer or MEN type 2. Acute pancreatitis has been observed in patients taking liraglutide. Liraglutide causes C-cell tumors in rats and mice. It is unknown whether liraglutide causes tumors in humans. Start at 0.6mg, increasing the dose weekly up to 3mg.     TRULICITY (Dulaglutide): A weekly injectable (4.5mg weekly) medication used for type 2 diabetes. Glucagon-like peptide-1(GLP-1) agonist. Contraindications include personal or family history of medullary thyroid cancer or MEN type 2. Acute pancreatitis has been observed in patients taking liraglutide. Dulaglutide causes C-cell tumors in rats and mice. It is unknown whether liraglutide causes tumors in humans. Start at 0.75 mg, 1.5 mg, 3.0 mg, to 4.5 mg increasing the dose monthly.      VYVANSE (Lisdexamfetamine dimesylate): a CNS stimulant used to treat ADHD. Indicated for the treatment of moderate to severe Binge Eating Disorder in Adults. Contraindicated in patients with known heart disease, structural abnormalities of the heart, serious heart arrhythmias or unexplained syncope. CNS stimulants such as vyvanse may cause manic or psychotic symptoms in patients with BPAD or pre-existing psychosis. Use with caution in patients with  Raynaud's phenomenon. Most common side effects include dry mouth, insomnia, decreased appetite, increased heart rate, jittery feeling, constipation and anxiety.     WEGOVY (Semaglutide): A weekly injectable (2.4mg weekly) medication used for type 2 diabetes (Ozempic). Glucagon-like peptide-1 (GLP-1) agonist. Contraindications include personal or family history of medullary thyroid cancer or MEN type 2. Acute pancreatitis has been observed in patients taking Semaglutide. Semaglutide causes C-cell tumors in rats and mice. It is unknown whether Semaglutide causes tumors in humans. Start at 0.25mg, increasing to 0.5, 1.0, 1.7 then 2.4 monthly.     MOUNJARO (Tirzepatide): A weekly injectable medication indicated for type 2 diabetes. Glucagon-like-peptide-1 (GLP-1) agonist and Glucose-Dependent Insulinotropic Polypeptide (GIP) agonist. Contraindications include personal or family history of medullary thyroid cancer or MEN type 2. Acute pancreatitis has been observed in patients taking Tirzepatide. Tirzepatide causes C-cell tumors in rats and mice. It is unknown whether Tirzepatide causes tumors in humans. Watch for neck mass, difficulty swallowing, persistent hoarseness, and epigastric abdominal pain. Most common side effects include nausea, diarrhea, vomiting, constipation, dyspepsia, and abdominal pain. Start at 2.5mg, increasing to 5.0, 7.5, 10, 12.5 then 15mg monthly.
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
## 2023-04-06 ENCOUNTER — TELEPHONE (OUTPATIENT)
Dept: SURGERY | Facility: CLINIC | Age: 53
End: 2023-04-06
Payer: COMMERCIAL

## 2023-04-06 NOTE — TELEPHONE ENCOUNTER
Prior Authorization Retail Medication Request    Medication/Dose: Wegovy Auto-injectors titrating doses  Previously Tried and Failed:  Watching Portions or Calories, Exercise, Weight Watchers, Fasting  Rationale:  0.25 mg dosing was approved.    Key for [id number]  Key for 1mg=[id number]    Pharmacy Information (if different than what is on RX)  Name:  [facility]  Phone:  [phone]

## 2023-04-07 ENCOUNTER — VIRTUAL VISIT (OUTPATIENT)
Dept: SURGERY | Facility: CLINIC | Age: 53
End: 2023-04-07
Payer: COMMERCIAL

## 2023-04-07 DIAGNOSIS — E66.01 CLASS 3 SEVERE OBESITY DUE TO EXCESS CALORIES WITHOUT SERIOUS COMORBIDITY WITH BODY MASS INDEX (BMI) OF 40.0 TO 44.9 IN ADULT (H): Primary | ICD-10-CM

## 2023-04-07 DIAGNOSIS — Z71.3 NUTRITIONAL COUNSELING: ICD-10-CM

## 2023-04-07 DIAGNOSIS — E66.813 CLASS 3 SEVERE OBESITY DUE TO EXCESS CALORIES WITHOUT SERIOUS COMORBIDITY WITH BODY MASS INDEX (BMI) OF 40.0 TO 44.9 IN ADULT (H): Primary | ICD-10-CM

## 2023-04-07 PROCEDURE — 97802 MEDICAL NUTRITION INDIV IN: CPT | Mod: VID | Performed by: DIETITIAN, REGISTERED

## 2023-04-07 NOTE — PROGRESS NOTES
Deepa Anderson is a 52 year old who is being evaluated via a billable video visit.      How would you like to obtain your AVS? MyChart  If the video visit is dropped, the invitation should be resent by: Text to cell phone: [phone]  Will anyone else be joining your video visit? No          Medical Weight Loss Initial Diet Evaluation  Assessment:  This patient was referred by Dr. José for MNT as treatment for Obesity .  Deepa is presenting today for a new weight management nutrition consultation. Pt has had an initial appointment with Dr. José.    Weight loss medication: Phentermine. Wegovy      Anthropometrics:    Pt's weight is 305 lbs  Initial weight: 305 lbs  Weight change: 0  BMI: There is no height or weight on file to calculate BMI.   Ideal body weight: 70.8 kg (156 lb 1.4 oz)  Adjusted ideal body weight: 97.9 kg (215 lb 13.6 oz)    Estimated RMR ([place]-St Jeor equation):  2104 kcals x 1.3 (light active) = 2735 kcals (for weight maintenance)    Recommended Protein Intake: 60-80 grams of protein/day    Medical History:  Patient Active Problem List   Diagnosis     History of dysplastic nevus     Acne vulgaris     CARDIOVASCULAR SCREENING; LDL GOAL LESS THAN 160     Tubular adenoma of colon     Tobacco use disorder     Body mass index (BMI) of 40.0-44.9 in adult (H)     Hypertriglyceridemia     Gastroesophageal reflux disease, esophagitis presence not specified     Sedentary lifestyle     Menometrorrhagia     MELANI (generalized anxiety disorder)     Situational anxiety     Perimenopause     Heartburn     Morbid obesity with BMI of 40.0-44.9, adult (H)     Primary osteoarthritis of both hands     Low back pain     Knee pain     Impaired fasting glucose      Diabetes: No  HbA1c:  No results found for: HGBA1C    Nutrition History:   Food allergies/intolerances/cultural or religous food customs: No     Vitamins/Mineral Supplementation: None    Dietary Recall:  Breakfast: skipped  Lunch: something light-small  frozen meal (Lean Cuisine)   Dinner: soup or salad (something simple)  Typical Snacks: on occasion-corn nuts or something crunchy  Overnight eating: No  Eating out: 0-1 times/week     Beverages: water (80 oz/day), coffee in the AM with powdered creamer or liquid creamer     Exercise: no set regimen     Nutrition Diagnosis (PES statement):   Overweight/Obesity (NC 3.3) related to overeating and poor lifestyle habits as evidenced by patient report of inability to lose weight, lack of exercise, and BMI of 42.61 kg/m2.     Nutrition Intervention  1. Food and/or Nutrient Delivery   a. Placed emphasis on importance of developing a healthy meal routine, aiming for 3 meals a day and no snacks.  b. Discussed using a protein supplement as a meal replacement.    2. Nutrition Education   a. Discussed with patient how to build a meal: the importance of including a lean/low fat protein at each meal, include a source of vegetables at a minimum of lunch and dinner and limiting carbohydrate intake to <25% per meal.  b. Educated on sources of lean protein, portion sizes, the amount of grams found in each source. Recommend patient to aim for 20-30g protein at each meal.  c. Educated on how to read a food label: keeping total fat <10g and sugar <10g per serving.  d. Discussed the importance of adequate hydration, with emphasis on drinking 64oz of water or zero calorie beverages per day.  3. Nutrition Counseling   a. Encouraged importance of developing routine exercise for health benefits and weight loss.    Goals established by patient:   1. Start working on meal planning for meals.   2. Work on incorporating a protein shake in at breakfast.   3. Start walking for exercise.    Handouts provided:  Non Surgical Weight Loss packet  Recipe Resources  Quick and Easy Meals     Assessment/Plan:    Pt will follow up in 1 month(s) with bariatrician and 2 month(s) with dietitian.       Video-Visit Details    Type of service:  Video  Visit    Video Start Time (time video started): 11:17 am    Video End Time (time video stopped): 11:37 am    Originating Location (pt. Location): Home        Distant Location (provider location):  Off-site    Mode of Communication:  Video Conference via [facility]    Physician has received verbal consent for a Video Visit from the patient? Yes      Paloma Hayes, RD

## 2023-04-07 NOTE — LETTER
4/7/2023         RE: Deepa Anderson  [address] [facility] [address]        Dear Colleague,    Thank you for referring your patient, Deepa Anderson, to the [facility] [place]. Please see a copy of my visit note below.    Deepa Anderson is a 52 year old who is being evaluated via a billable video visit.      How would you like to obtain your AVS? MyChart  If the video visit is dropped, the invitation should be resent by: Text to cell phone: [phone]  Will anyone else be joining your video visit? No          Medical Weight Loss Initial Diet Evaluation  Assessment:  This patient was referred by Dr. José for MNT as treatment for Obesity .  Deepa is presenting today for a new weight management nutrition consultation. Pt has had an initial appointment with Dr. José.    Weight loss medication: Phentermine. Wegovy      Anthropometrics:    Pt's weight is 305 lbs  Initial weight: 305 lbs  Weight change: 0  BMI: There is no height or weight on file to calculate BMI.   Ideal body weight: 70.8 kg (156 lb 1.4 oz)  Adjusted ideal body weight: 97.9 kg (215 lb 13.6 oz)    Estimated RMR ([place]-St Jeor equation):  2104 kcals x 1.3 (light active) = 2735 kcals (for weight maintenance)    Recommended Protein Intake: 60-80 grams of protein/day    Medical History:  Patient Active Problem List   Diagnosis     History of dysplastic nevus     Acne vulgaris     CARDIOVASCULAR SCREENING; LDL GOAL LESS THAN 160     Tubular adenoma of colon     Tobacco use disorder     Body mass index (BMI) of 40.0-44.9 in adult (H)     Hypertriglyceridemia     Gastroesophageal reflux disease, esophagitis presence not specified     Sedentary lifestyle     Menometrorrhagia     MELANI (generalized anxiety disorder)     Situational anxiety     Perimenopause     Heartburn     Morbid obesity with BMI of 40.0-44.9, adult (H)     Primary osteoarthritis of both hands     Low back pain     Knee pain      Impaired fasting glucose      Diabetes: No  HbA1c:  No results found for: HGBA1C    Nutrition History:   Food allergies/intolerances/cultural or religous food customs: No     Vitamins/Mineral Supplementation: None    Dietary Recall:  Breakfast: skipped  Lunch: something light-small frozen meal (Lean Cuisine)   Dinner: soup or salad (something simple)  Typical Snacks: on occasion-corn nuts or something crunchy  Overnight eating: No  Eating out: 0-1 times/week     Beverages: water (80 oz/day), coffee in the AM with powdered creamer or liquid creamer     Exercise: no set regimen     Nutrition Diagnosis (PES statement):   Overweight/Obesity (NC 3.3) related to overeating and poor lifestyle habits as evidenced by patient report of inability to lose weight, lack of exercise, and BMI of 42.61 kg/m2.     Nutrition Intervention  1. Food and/or Nutrient Delivery   a. Placed emphasis on importance of developing a healthy meal routine, aiming for 3 meals a day and no snacks.  b. Discussed using a protein supplement as a meal replacement.    2. Nutrition Education   a. Discussed with patient how to build a meal: the importance of including a lean/low fat protein at each meal, include a source of vegetables at a minimum of lunch and dinner and limiting carbohydrate intake to <25% per meal.  b. Educated on sources of lean protein, portion sizes, the amount of grams found in each source. Recommend patient to aim for 20-30g protein at each meal.  c. Educated on how to read a food label: keeping total fat <10g and sugar <10g per serving.  d. Discussed the importance of adequate hydration, with emphasis on drinking 64oz of water or zero calorie beverages per day.  3. Nutrition Counseling   a. Encouraged importance of developing routine exercise for health benefits and weight loss.    Goals established by patient:   1. Start working on meal planning for meals.   2. Work on incorporating a protein shake in at breakfast.   3. Start  walking for exercise.    Handouts provided:  Non Surgical Weight Loss packet  Recipe Resources  Quick and Easy Meals     Assessment/Plan:    Pt will follow up in 1 month(s) with bariatrician and 2 month(s) with dietitian.       Video-Visit Details    Type of service:  Video Visit    Video Start Time (time video started): 11:17 am    Video End Time (time video stopped): 11:37 am    Originating Location (pt. Location): Home        Distant Location (provider location):  Off-site    Mode of Communication:  Video Conference via [facility]    Physician has received verbal consent for a Video Visit from the patient? Yes      Paloma Hayes RD          Again, thank you for allowing me to participate in the care of your patient.        Sincerely,        Paloma Hayes RD

## 2023-04-23 ENCOUNTER — HEALTH MAINTENANCE LETTER (OUTPATIENT)
Age: 53
End: 2023-04-23

## 2023-05-25 ENCOUNTER — OFFICE VISIT (OUTPATIENT)
Dept: SURGERY | Facility: CLINIC | Age: 53
End: 2023-05-25
Payer: COMMERCIAL

## 2023-05-25 VITALS
SYSTOLIC BLOOD PRESSURE: 132 MMHG | DIASTOLIC BLOOD PRESSURE: 82 MMHG | WEIGHT: 285.4 LBS | BODY MASS INDEX: 39.96 KG/M2 | HEIGHT: 71 IN

## 2023-05-25 DIAGNOSIS — E66.01 CLASS 3 SEVERE OBESITY DUE TO EXCESS CALORIES WITHOUT SERIOUS COMORBIDITY WITH BODY MASS INDEX (BMI) OF 40.0 TO 44.9 IN ADULT (H): Primary | ICD-10-CM

## 2023-05-25 DIAGNOSIS — E66.813 CLASS 3 SEVERE OBESITY DUE TO EXCESS CALORIES WITHOUT SERIOUS COMORBIDITY WITH BODY MASS INDEX (BMI) OF 40.0 TO 44.9 IN ADULT (H): Primary | ICD-10-CM

## 2023-05-25 PROCEDURE — 99214 OFFICE O/P EST MOD 30 MIN: CPT | Performed by: FAMILY MEDICINE

## 2023-05-25 RX ORDER — SEMAGLUTIDE 1.7 MG/.75ML
1.7 INJECTION, SOLUTION SUBCUTANEOUS WEEKLY
Qty: 3 ML | Refills: 0 | Status: SHIPPED | OUTPATIENT
Start: 2023-06-08 | End: 2023-07-06

## 2023-05-25 RX ORDER — VITAMIN B COMPLEX
TABLET ORAL DAILY
COMMUNITY

## 2023-05-25 RX ORDER — SEMAGLUTIDE 2.4 MG/.75ML
2.4 INJECTION, SOLUTION SUBCUTANEOUS WEEKLY
Qty: 9 ML | Refills: 3 | Status: SHIPPED | OUTPATIENT
Start: 2023-07-01 | End: 2024-06-03

## 2023-05-25 NOTE — PROGRESS NOTES
Bariatric Follow-up    52 year old  female BMI:Body mass index is 39.81 kg/m .    Weight:   Wt Readings from Last 1 Encounters:   05/25/23 129.5 kg (285 lb 6.4 oz)    pounds      Comorbidities:  Patient Active Problem List   Diagnosis     History of dysplastic nevus     Acne vulgaris     CARDIOVASCULAR SCREENING; LDL GOAL LESS THAN 160     Tubular adenoma of colon     Tobacco use disorder     Body mass index (BMI) of 40.0-44.9 in adult (H)     Hypertriglyceridemia     Gastroesophageal reflux disease, esophagitis presence not specified     Sedentary lifestyle     Menometrorrhagia     MELANI (generalized anxiety disorder)     Situational anxiety     Perimenopause     Heartburn     Morbid obesity with BMI of 40.0-44.9, adult (H)     Primary osteoarthritis of both hands     Low back pain     Knee pain     Impaired fasting glucose     Interim: 20# down. BMI has gone below 40 and weight <300! Work has been busy.  was in the hospital for COVID and he has residual symptoms of his right hand-weakness/shakiness. He also received the Shingles vaccine.  Starting Wegovy 1.0- Wegovy has had one injection. Has had some constipation treated with dulcolax once a week and colace daily. Interested in increasing to the 1.7mg ad 2.4mg dose after 4 weeks on 1.0g.    Plan:  DIET  Continue whole foods, 3 protein containing meals   EXERCISE continue walking and monitoring steps   PHARMACOTHERAPY continue phentermine and ramping up on wegovy    congratulated on lifestyle changes. Continue with follow up for accountability. Tobacco cessation when ready. Discussed Chantix start. She has Chantix at home.     -We reviewed her medications and whether associated with weight gain.  Current Outpatient Medications   Medication     citalopram (CELEXA) 20 MG tablet     loperamide (IMODIUM) 2 MG capsule     Multiple Vitamins-Minerals (WOMENS 50+ MULTI VITAMIN/MIN PO)     omeprazole (PRILOSEC) 20 MG DR capsule     phentermine (ADIPEX-P) 37.5  MG tablet     [START ON 6/8/2023] Semaglutide-Weight Management (WEGOVY) 1.7 MG/0.75ML pen     [START ON 7/1/2023] Semaglutide-Weight Management (WEGOVY) 2.4 MG/0.75ML pen     Vitamin D3 (CHOLECALCIFEROL) 25 mcg (1000 units) tablet     varenicline (CHANTIX CHENCHO) 0.5 MG X 11 & 1 MG X 42 tablet     varenicline (CHANTIX) 1 MG tablet     No current facility-administered medications for this visit.        We discussed [facility] Bariatric Basics including:  -eating 3 meals daily  -eating protein first  -eating slowly, chewing food well  -avoiding/limiting calorie containing beverages  -choosing wheat, not white with breads, crackers, pastas, regan, bagels, tortillas, rice  -limiting restaurant or cafeteria eating to twice a week or less  -We discussed the importance of restorative sleep and stress management in maintaining a healthy weight.  -We discussed insulin resistance and glycemic index as it relates to appetite and weight control  -We discussed the National Weight Control Registry healthy weight maintenance strategies and ways to optimize metabolism.  -We discussed the importance of physical activity including cardiovascular and strength training in maintaining a healthier weight and explored viable options.    Most recent labs:  Lab Results   Component Value Date    WBC 6.6 04/04/2023    HGB 14.6 04/04/2023    HCT 43.6 04/04/2023    MCV 91 04/04/2023     04/04/2023     Lab Results   Component Value Date    CHOL 213 (H) 09/27/2022     Lab Results   Component Value Date    HDL 60 09/27/2022       Lab Results   Component Value Date    TRIG 129 09/27/2022       Lab Results   Component Value Date    ALT 13 04/04/2023    AST 23 04/04/2023    ALKPHOS 99 04/04/2023       Lab Results   Component Value Date    TSH 1.44 04/04/2023     DIETARY HISTORY  Meals Per Day: yes  Eating Protein First?: yes  Food Diary: B:protein shake quest powder with 26 gm protein L:at home chicken salad with triscuits, at work frozen  "omelettes, protein bars,  D:wrap from [employer]  Snacks Per Day: not much  Typical Snack: mixed fruit if around  Fluid Intake: intentional  Portion Control: improved  Calorie Containing Beverages: no  Alcohol per week: 4  Typical Protein Food Choices  Choosing Whole Grains: yes  Grocery Shopping is done by: herself  Food Preparation is done by: herself  Meals at Restaurant/Cafeteria/Take Out Per Week: 0-1  Eating at the Table:   TV is Off During Meals:     Positive Changes Since Last Visit: whole foods, smaller portions  Struggling With: walking on lunch hours    Knowledgeable in Reading Food Labels:   Getting Adequate Protein: yes  Sleeping 7-8 hours/day 7 hours  Stress management walking    PHYSICAL ACTIVITY PATTERNS:  Cardiovascular: walking on lunch hours  Strength Training: no    REVIEW OF SYSTEMS  GENERAL/CONSTITUTIONAL:  Fatigue: improved  CARDIOVASCULAR:  Chest Pain with Exertion:   PULMONARY:  Dyspnea on exertion:   CPAP Use: NA  Tobacco Use: no  Asthma Controlled: no  GASTROINTESTINAL:  GERD/Heartburn: occ  Gallbladder:   UROLOGIC:  Urinary Symptoms:   NEUROLOGIC:  Headaches: no  Paresthesias: no  PSYCHIATRIC:  Moods: euthymic  MUSCULOSKELETAL/RHEUMATOLOGIC  Arthralgias:  Myalgias:   ENDOCRINE:  Monitoring Blood Sugars: no  Sugars Well Controlled: yes  DERMATOLOGIC:  Rashes:     PHYSICAL EXAM:  Vitals: /82 (BP Location: Right arm, Patient Position: Sitting)   Ht 1.803 m (5' 11\")   Wt 129.5 kg (285 lb 6.4 oz)   LMP 05/02/2019 (Approximate)   BMI 39.81 kg/m        GEN: Pleasant, well groomed, in no acute distress  EYES: EOMI,  ENT: airway patent  NECK: no carotid bruits, no anterior/supraclavicular lymphadenopathy, thyroid normal   HEART: Rhythm regular, rate regular, no murmur   LUNGS: Clear  ABDOMEN: soft, non-tender, obese, no rashes   VASCULAR: no  lower extremity edema  MUSCULOSKELETAL:  muscle mass OK  SKIN:  no color changes of venous stasis, no ulcerations    Total time spent on the date of " this encounter doing: chart review, review of test results, patient visit, physical exam, education, counseling, developing plan of care, and documenting = 30minutes.

## 2023-05-25 NOTE — LETTER
5/25/2023         RE: Deepa Anderson  [address] [facility] [address]        Dear Colleague,    Thank you for referring your patient, Deepa Anderson, to the [facility] [place]. Please see a copy of my visit note below.    Bariatric Follow-up    52 year old  female BMI:Body mass index is 39.81 kg/m .    Weight:   Wt Readings from Last 1 Encounters:   05/25/23 129.5 kg (285 lb 6.4 oz)    pounds      Comorbidities:  Patient Active Problem List   Diagnosis     History of dysplastic nevus     Acne vulgaris     CARDIOVASCULAR SCREENING; LDL GOAL LESS THAN 160     Tubular adenoma of colon     Tobacco use disorder     Body mass index (BMI) of 40.0-44.9 in adult (H)     Hypertriglyceridemia     Gastroesophageal reflux disease, esophagitis presence not specified     Sedentary lifestyle     Menometrorrhagia     MELANI (generalized anxiety disorder)     Situational anxiety     Perimenopause     Heartburn     Morbid obesity with BMI of 40.0-44.9, adult (H)     Primary osteoarthritis of both hands     Low back pain     Knee pain     Impaired fasting glucose     Interim: 20# down. BMI has gone below 40 and weight <300! Work has been busy.  was in the hospital for COVID and he has residual symptoms of his right hand-weakness/shakiness. He also received the Shingles vaccine.  Starting Wegovy 1.0- Wegovy has had one injection. Has had some constipation treated with dulcolax once a week and colace daily. Interested in increasing to the 1.7mg ad 2.4mg dose after 4 weeks on 1.0g.    Plan:  DIET  Continue whole foods, 3 protein containing meals   EXERCISE continue walking and monitoring steps   PHARMACOTHERAPY continue phentermine and ramping up on wegovy    congratulated on lifestyle changes. Continue with follow up for accountability. Tobacco cessation when ready. Discussed Chantix start. She has Chantix at home.     -We reviewed her medications and whether  associated with weight gain.  Current Outpatient Medications   Medication     citalopram (CELEXA) 20 MG tablet     loperamide (IMODIUM) 2 MG capsule     Multiple Vitamins-Minerals (WOMENS 50+ MULTI VITAMIN/MIN PO)     omeprazole (PRILOSEC) 20 MG DR capsule     phentermine (ADIPEX-P) 37.5 MG tablet     [START ON 6/8/2023] Semaglutide-Weight Management (WEGOVY) 1.7 MG/0.75ML pen     [START ON 7/1/2023] Semaglutide-Weight Management (WEGOVY) 2.4 MG/0.75ML pen     Vitamin D3 (CHOLECALCIFEROL) 25 mcg (1000 units) tablet     varenicline (CHANTIX CHENCHO) 0.5 MG X 11 & 1 MG X 42 tablet     varenicline (CHANTIX) 1 MG tablet     No current facility-administered medications for this visit.        We discussed [facility] Bariatric Basics including:  -eating 3 meals daily  -eating protein first  -eating slowly, chewing food well  -avoiding/limiting calorie containing beverages  -choosing wheat, not white with breads, crackers, pastas, regan, bagels, tortillas, rice  -limiting restaurant or cafeteria eating to twice a week or less  -We discussed the importance of restorative sleep and stress management in maintaining a healthy weight.  -We discussed insulin resistance and glycemic index as it relates to appetite and weight control  -We discussed the National Weight Control Registry healthy weight maintenance strategies and ways to optimize metabolism.  -We discussed the importance of physical activity including cardiovascular and strength training in maintaining a healthier weight and explored viable options.    Most recent labs:  Lab Results   Component Value Date    WBC 6.6 04/04/2023    HGB 14.6 04/04/2023    HCT 43.6 04/04/2023    MCV 91 04/04/2023     04/04/2023     Lab Results   Component Value Date    CHOL 213 (H) 09/27/2022     Lab Results   Component Value Date    HDL 60 09/27/2022       Lab Results   Component Value Date    TRIG 129 09/27/2022       Lab Results   Component Value Date    ALT 13 04/04/2023    AST 23  "04/04/2023    ALKPHOS 99 04/04/2023       Lab Results   Component Value Date    TSH 1.44 04/04/2023     DIETARY HISTORY  Meals Per Day: yes  Eating Protein First?: yes  Food Diary: B:protein shake quest powder with 26 gm protein L:at home chicken salad with triscuits, at work frozen omelettes, protein bars,  D:wrap from [employer]  Snacks Per Day: not much  Typical Snack: mixed fruit if around  Fluid Intake: intentional  Portion Control: improved  Calorie Containing Beverages: no  Alcohol per week: 4  Typical Protein Food Choices  Choosing Whole Grains: yes  Grocery Shopping is done by: herself  Food Preparation is done by: herself  Meals at Restaurant/Cafeteria/Take Out Per Week: 0-1  Eating at the Table:   TV is Off During Meals:     Positive Changes Since Last Visit: whole foods, smaller portions  Struggling With: walking on lunch hours    Knowledgeable in Reading Food Labels:   Getting Adequate Protein: yes  Sleeping 7-8 hours/day 7 hours  Stress management walking    PHYSICAL ACTIVITY PATTERNS:  Cardiovascular: walking on lunch hours  Strength Training: no    REVIEW OF SYSTEMS  GENERAL/CONSTITUTIONAL:  Fatigue: improved  CARDIOVASCULAR:  Chest Pain with Exertion:   PULMONARY:  Dyspnea on exertion:   CPAP Use: NA  Tobacco Use: no  Asthma Controlled: no  GASTROINTESTINAL:  GERD/Heartburn: occ  Gallbladder:   UROLOGIC:  Urinary Symptoms:   NEUROLOGIC:  Headaches: no  Paresthesias: no  PSYCHIATRIC:  Moods: euthymic  MUSCULOSKELETAL/RHEUMATOLOGIC  Arthralgias:  Myalgias:   ENDOCRINE:  Monitoring Blood Sugars: no  Sugars Well Controlled: yes  DERMATOLOGIC:  Rashes:     PHYSICAL EXAM:  Vitals: /82 (BP Location: Right arm, Patient Position: Sitting)   Ht 1.803 m (5' 11\")   Wt 129.5 kg (285 lb 6.4 oz)   LMP 05/02/2019 (Approximate)   BMI 39.81 kg/m        GEN: Pleasant, well groomed, in no acute distress  EYES: EOMI,  ENT: airway patent  NECK: no carotid bruits, no anterior/supraclavicular lymphadenopathy, thyroid " normal   HEART: Rhythm regular, rate regular, no murmur   LUNGS: Clear  ABDOMEN: soft, non-tender, obese, no rashes   VASCULAR: no  lower extremity edema  MUSCULOSKELETAL:  muscle mass OK  SKIN:  no color changes of venous stasis, no ulcerations    Total time spent on the date of this encounter doing: chart review, review of test results, patient visit, physical exam, education, counseling, developing plan of care, and documenting = 30minutes.              Again, thank you for allowing me to participate in the care of your patient.        Sincerely,        Melisa José MD

## 2023-06-06 ENCOUNTER — VIRTUAL VISIT (OUTPATIENT)
Dept: SURGERY | Facility: CLINIC | Age: 53
End: 2023-06-06
Payer: COMMERCIAL

## 2023-06-06 DIAGNOSIS — Z71.3 NUTRITIONAL COUNSELING: ICD-10-CM

## 2023-06-06 DIAGNOSIS — E66.812 CLASS 2 OBESITY DUE TO EXCESS CALORIES WITHOUT SERIOUS COMORBIDITY WITH BODY MASS INDEX (BMI) OF 39.0 TO 39.9 IN ADULT: Primary | ICD-10-CM

## 2023-06-06 DIAGNOSIS — E66.09 CLASS 2 OBESITY DUE TO EXCESS CALORIES WITHOUT SERIOUS COMORBIDITY WITH BODY MASS INDEX (BMI) OF 39.0 TO 39.9 IN ADULT: Primary | ICD-10-CM

## 2023-06-06 PROCEDURE — 97803 MED NUTRITION INDIV SUBSEQ: CPT | Mod: VID | Performed by: DIETITIAN, REGISTERED

## 2023-06-06 NOTE — LETTER
6/6/2023         RE: Deepa Anderson  [address] [facility] [address]        Dear Colleague,    Thank you for referring your patient, Deepa Anderson, to the [facility]ATR[facility] [place]. Please see a copy of my visit note below.    Deepa Anderson is a 52 year old who is being evaluated via a billable video visit.      How would you like to obtain your AVS? MyChart  If the video visit is dropped, the invitation should be resent by: Send to e-mail at: [email or web address]  Will anyone else be joining your video visit? No        Medical  Weight Loss Follow-Up Diet Evaluation  Assessment:  Deepa is presenting today for a follow up weight management nutrition consultation.  This patient has had an initial appointment and was referred by Dr. José for MNT as treatment for Obesity   Weight loss medication: Phentermine. Lexi  Pt's weight is 285 lbs  Initial weight: 305 lbs  Weight change: 20 lbs (down)         5/18/2023     1:11 PM   Changes and Difficulties   I have made the following changes to my diet since my last visit: Sugar free drinks, eating breakfast daily, portion limits, mindful of carbs/fat/calories   With regards to my diet, I am still struggling with: Getting more balanced meals   I have made the following changes to my activity/exercise since my last visit: Much more active and walking on lunch breaks   With regards to my activity/exercise, I am still struggling with: Adding in weight lifting exercises     BMI: There is no height or weight on file to calculate BMI.  Ideal body weight: 70.8 kg (156 lb 1.4 oz)  Adjusted ideal body weight: 94.3 kg (207 lb 13 oz)    Estimated RMR ([place]-St Jeor equation):   2013 kcals x 1.3 (light active) = 2617 kcals (for weight maintenance)     Recommended Protein Intake: 60-80 grams of protein/day  Patient Active Problem List:  Patient Active Problem List   Diagnosis     History of dysplastic nevus     Acne vulgaris      CARDIOVASCULAR SCREENING; LDL GOAL LESS THAN 160     Tubular adenoma of colon     Tobacco use disorder     Body mass index (BMI) of 40.0-44.9 in adult (H)     Hypertriglyceridemia     Gastroesophageal reflux disease, esophagitis presence not specified     Sedentary lifestyle     Menometrorrhagia     MELANI (generalized anxiety disorder)     Situational anxiety     Perimenopause     Heartburn     Morbid obesity with BMI of 40.0-44.9, adult (H)     Primary osteoarthritis of both hands     Low back pain     Knee pain     Impaired fasting glucose     Diabetes: No     Progress on goals from last visit: Has been struggling with some constipation since starting the medications.  Has been dealing with some stressors, which has been a barrier at times in regards to her overall health and well being.     Dietary Recall:  Breakfast: Protein Shake   Lunch:wrap with protein, pickle   Dinner:raisin bran   Typical snacks: nothing currently  Beverages: water-at least 64 oz/day +, SF Peach Tea, Sparkling Ice on occasion, Coffee on occasion  Exercise: walking at work, down recently due to helping her     Nutrition Diagnosis:    Obesity (NC 3.3) related to overeating and poor lifestyle habits as evidenced by patient's subjective statements and BMI of 39.8 kg/m2.     Intervention:  1. Food and/or nutrient delivery: balanced meals, adequate protein   2. Nutrition education: Fiber Foods   3. Nutrition counseling: provided support and encouragement    Monitoring/Evaluation:    Goals:  1. Work on incorporating increased fiber throughout the day to help with constipation.   2. Re-establish exercise as able.   3. Continue to focus on protein first at each meal, aiming for 20-30 grams of protein/meal, 3 meals/day.    Patient to follow up in 2 month(s) with bariatrician and 3 month(s) with RD      Video-Visit Details    Type of service:  Video Visit    Video Start Time (time video started): 10:46 am     Video End Time (time video stopped):  10:57 am    Originating Location (pt. Location): Home        Distant Location (provider location):  Off-site    Mode of Communication:  Video Conference via [facility]    Physician has received verbal consent for a Video Visit from the patient? Yes      Paloma Hayes RD            Again, thank you for allowing me to participate in the care of your patient.        Sincerely,        Paloma Hayes RD

## 2023-06-06 NOTE — PROGRESS NOTES
Deepa Anderson is a 52 year old who is being evaluated via a billable video visit.      How would you like to obtain your AVS? MyChart  If the video visit is dropped, the invitation should be resent by: Send to e-mail at: [email or web address]  Will anyone else be joining your video visit? No        Medical  Weight Loss Follow-Up Diet Evaluation  Assessment:  Deepa is presenting today for a follow up weight management nutrition consultation.  This patient has had an initial appointment and was referred by Dr. José for MNT as treatment for Obesity   Weight loss medication: Phentermine. Lexi  Pt's weight is 285 lbs  Initial weight: 305 lbs  Weight change: 20 lbs (down)         5/18/2023     1:11 PM   Changes and Difficulties   I have made the following changes to my diet since my last visit: Sugar free drinks, eating breakfast daily, portion limits, mindful of carbs/fat/calories   With regards to my diet, I am still struggling with: Getting more balanced meals   I have made the following changes to my activity/exercise since my last visit: Much more active and walking on lunch breaks   With regards to my activity/exercise, I am still struggling with: Adding in weight lifting exercises     BMI: There is no height or weight on file to calculate BMI.  Ideal body weight: 70.8 kg (156 lb 1.4 oz)  Adjusted ideal body weight: 94.3 kg (207 lb 13 oz)    Estimated RMR ([place]-St Jeor equation):   2013 kcals x 1.3 (light active) = 2617 kcals (for weight maintenance)     Recommended Protein Intake: 60-80 grams of protein/day  Patient Active Problem List:  Patient Active Problem List   Diagnosis     History of dysplastic nevus     Acne vulgaris     CARDIOVASCULAR SCREENING; LDL GOAL LESS THAN 160     Tubular adenoma of colon     Tobacco use disorder     Body mass index (BMI) of 40.0-44.9 in adult (H)     Hypertriglyceridemia     Gastroesophageal reflux disease, esophagitis presence not specified     Sedentary lifestyle      Menometrorrhagia     MELANI (generalized anxiety disorder)     Situational anxiety     Perimenopause     Heartburn     Morbid obesity with BMI of 40.0-44.9, adult (H)     Primary osteoarthritis of both hands     Low back pain     Knee pain     Impaired fasting glucose     Diabetes: No     Progress on goals from last visit: Has been struggling with some constipation since starting the medications.  Has been dealing with some stressors, which has been a barrier at times in regards to her overall health and well being.     Dietary Recall:  Breakfast: Protein Shake   Lunch:wrap with protein, pickle   Dinner:raisin bran   Typical snacks: nothing currently  Beverages: water-at least 64 oz/day +, SF Peach Tea, Sparkling Ice on occasion, Coffee on occasion  Exercise: walking at work, down recently due to helping her     Nutrition Diagnosis:    Obesity (NC 3.3) related to overeating and poor lifestyle habits as evidenced by patient's subjective statements and BMI of 39.8 kg/m2.     Intervention:  1. Food and/or nutrient delivery: balanced meals, adequate protein   2. Nutrition education: Fiber Foods   3. Nutrition counseling: provided support and encouragement    Monitoring/Evaluation:    Goals:  1. Work on incorporating increased fiber throughout the day to help with constipation.   2. Re-establish exercise as able.   3. Continue to focus on protein first at each meal, aiming for 20-30 grams of protein/meal, 3 meals/day.    Patient to follow up in 2 month(s) with bariatrician and 3 month(s) with RD      Video-Visit Details    Type of service:  Video Visit    Video Start Time (time video started): 10:46 am     Video End Time (time video stopped): 10:57 am    Originating Location (pt. Location): Home        Distant Location (provider location):  Off-site    Mode of Communication:  Video Conference via [facility]    Physician has received verbal consent for a Video Visit from the patient? Yes      Paloma Hayes,  RD

## 2023-07-06 ENCOUNTER — PATIENT OUTREACH (OUTPATIENT)
Dept: CARE COORDINATION | Facility: CLINIC | Age: 53
End: 2023-07-06
Payer: COMMERCIAL

## 2023-07-14 ENCOUNTER — OFFICE VISIT (OUTPATIENT)
Dept: OTOLARYNGOLOGY | Facility: CLINIC | Age: 53
End: 2023-07-14
Payer: COMMERCIAL

## 2023-07-14 VITALS — OXYGEN SATURATION: 97 % | SYSTOLIC BLOOD PRESSURE: 113 MMHG | HEART RATE: 85 BPM | DIASTOLIC BLOOD PRESSURE: 76 MMHG

## 2023-07-14 DIAGNOSIS — K11.20 PAROTITIS: Primary | ICD-10-CM

## 2023-07-14 PROCEDURE — 99213 OFFICE O/P EST LOW 20 MIN: CPT | Performed by: OTOLARYNGOLOGY

## 2023-07-14 NOTE — PROGRESS NOTES
Chief Complaint - right facial swelling, recheck tonsils    History of Present Illness - Deepa Anderson is a 53 year old female who returns. 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 She is status post direct laryngoscopy and left neck level 2 mass excision on 6/29/2017. The patient does smoke.     She returns and notes sudden right face swelling and pain last weekend. It was tender to touch. No skin redness. Never had this before. This came on suddenly. It lasted a few days, then resolved suddenly. Came on with eating. Doesn't think it was her jaw as it was more diffuse.     Tests personally reviewed today for this visit:   1.) glucose 104 9/27/22  2.) lipid panel 9/27/22 shows elevated cholesterol and LDL    Past Medical History -   Patient Active Problem List   Diagnosis     History of dysplastic nevus     Acne vulgaris     CARDIOVASCULAR SCREENING; LDL GOAL LESS THAN 160     Tubular adenoma of colon     Tobacco use disorder     Body mass index (BMI) of 40.0-44.9 in adult (H)     Hypertriglyceridemia     Gastroesophageal reflux disease, esophagitis presence not specified     Sedentary lifestyle     Menometrorrhagia     MELANI (generalized anxiety disorder)     Situational anxiety     Perimenopause     Heartburn     Morbid obesity with BMI of 40.0-44.9, adult (H)     Primary osteoarthritis of both hands     Low back pain     Knee pain     Impaired fasting glucose       Current Medications -   Current Outpatient Medications:      citalopram (CELEXA) 20 MG tablet, Take 1.5 tablets (30 mg) by mouth daily, Disp: 135 tablet, Rfl: 1     loperamide (IMODIUM) 2 MG capsule, Take 2 mg by mouth PRN, Disp: , Rfl:      Multiple Vitamins-Minerals (WOMENS 50+ MULTI VITAMIN/MIN PO), , Disp: , Rfl:      omeprazole (PRILOSEC) 20 MG DR capsule, Take 1 capsule (20 mg) by mouth daily, Disp: 90 capsule, Rfl: 3     Semaglutide-Weight Management (WEGOVY) 2.4 MG/0.75ML pen, Inject 2.4 mg  Subcutaneous once a week, Disp: 9 mL, Rfl: 3     varenicline (CHANTIX CHENCHO) 0.5 MG X 11 & 1 MG X 42 tablet, Take 0.5 mg tab daily for 3 days, THEN 0.5 mg tab twice daily for 4 days, THEN 1 mg twice daily. (Patient not taking: Reported on 5/25/2023), Disp: 53 tablet, Rfl: 0     varenicline (CHANTIX) 1 MG tablet, Take 1 tablet (1 mg) by mouth 2 times daily (Patient not taking: Reported on 5/25/2023), Disp: 60 tablet, Rfl: 1     Vitamin D3 (CHOLECALCIFEROL) 25 mcg (1000 units) tablet, Take by mouth daily, Disp: , Rfl:     Allergies -   Allergies   Allergen Reactions     Percodan [Aspirin]      Percolone [Oxycodone]      Per PT had no problems with this medication       Social History -   Social History     Socioeconomic History     Marital status:    Occupational History     Occupation:      Employer: [employer]   Tobacco Use     Smoking status: Light Smoker     Packs/day: 0.50     Years: 25.00     Pack years: 12.50     Types: Cigarettes     Smokeless tobacco: Current   Vaping Use     Vaping Use: Never used   Substance and Sexual Activity     Alcohol use: Yes     Comment: 3-4 drinks per week     Drug use: No     Sexual activity: Not Currently     Partners: Male     Birth control/protection: Female Surgical, None     Comment: Tubes tied 2006   Other Topics Concern     Parent/sibling w/ CABG, MI or angioplasty before 65F 55M? No       Family History -   Family History   Problem Relation Age of Onset     Macular Degeneration Mother      Arthritis Mother      Lipids Mother      Hypertension Mother      Obesity Mother      Thyroid Disease Mother      Diabetes Mother         Diet controlled     Cardiovascular Father      Hypertension Father      Cerebrovascular Disease Father      Hypertension Brother      Hypertension Brother      Cancer No family hx of      Glaucoma No family hx of      Diabetes No family hx of      Physical Exam  General - The patient is in no distress.  Alert, answers  questions and cooperates with examination appropriately.   Voice and Breathing - The patient was breathing comfortably without the use of accessory muscles. There was no wheezing, stridor, or stertor.  The patients voice was clear and strong.   Eyes - Extraocular movements intact. Sclera were not icteric or injected, conjunctiva were pink and moist.  Neurologic - Cranial nerves II-XII are grossly intact. Specifically, the facial nerve is intact, House-Brackmann grade 1 of 6.  Mouth - Examination of the oral cavity showed pink, healthy oral mucosa. No lesions or ulcerations noted.  The tongue was mobile and protrudes midline. I do see clear saliva out of the right Mildred's duct. No stone seen or palpated.   Oropharynx - The walls of the oropharynx were smooth, symmetric, and had no lesions or ulcerations. The tonsils were 2+. No right stones today. The uvula was midline and the palate raised symmetrically.   Neck - submandibular glands are normal. No overlying skin changes. No tenderness. The occipital, submental, submandibular, internal jugular chain, and supraclavicular nodes did not demonstrate any abnormal lymph nodes or masses. Palpation of the thyroid was soft and smooth, with no nodules or goiter appreciated.  The trachea was midline.    A/P - Deepa Anderson is a 53 year old female with right neck swelling which seems like the parotid gland. She had an episode of what seems to have been parotitis (right side). She is better. No stone today. We discussed sialogogues, hydration, and massage. Hot packs if it worsens. Return and consider CT neck.     We discussed a water pick for tonsil stones.  Return if this fails or things worsen.  She is a smoker but do not see any concerning findings of malignancy.        Rayray Morton MD  Otolaryngology  [facility]

## 2023-07-14 NOTE — LETTER
7/14/2023         RE: Deepa Anderson  [address] [facility] [id number]        Dear Colleague,    Thank you for referring your patient, Deepa Anderson, to the [facility]. Please see a copy of my visit note below.    Chief Complaint - right facial swelling, recheck tonsils    History of Present Illness - Deepa Anderson is a 53 year old female who returns. 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 She is status post direct laryngoscopy and left neck level 2 mass excision on 6/29/2017. The patient does smoke.     She returns and notes sudden right face swelling and pain last weekend. It was tender to touch. No skin redness. Never had this before. This came on suddenly. It lasted a few days, then resolved suddenly. Came on with eating. Doesn't think it was her jaw as it was more diffuse.     Tests personally reviewed today for this visit:   1.) glucose 104 9/27/22  2.) lipid panel 9/27/22 shows elevated cholesterol and LDL    Past Medical History -   Patient Active Problem List   Diagnosis     History of dysplastic nevus     Acne vulgaris     CARDIOVASCULAR SCREENING; LDL GOAL LESS THAN 160     Tubular adenoma of colon     Tobacco use disorder     Body mass index (BMI) of 40.0-44.9 in adult (H)     Hypertriglyceridemia     Gastroesophageal reflux disease, esophagitis presence not specified     Sedentary lifestyle     Menometrorrhagia     MELANI (generalized anxiety disorder)     Situational anxiety     Perimenopause     Heartburn     Morbid obesity with BMI of 40.0-44.9, adult (H)     Primary osteoarthritis of both hands     Low back pain     Knee pain     Impaired fasting glucose       Current Medications -   Current Outpatient Medications:      citalopram (CELEXA) 20 MG tablet, Take 1.5 tablets (30 mg) by mouth daily, Disp: 135 tablet, Rfl: 1     loperamide (IMODIUM) 2 MG capsule, Take 2 mg by mouth PRN, Disp: , Rfl:      Multiple  Vitamins-Minerals (WOMENS 50+ MULTI VITAMIN/MIN PO), , Disp: , Rfl:      omeprazole (PRILOSEC) 20 MG DR capsule, Take 1 capsule (20 mg) by mouth daily, Disp: 90 capsule, Rfl: 3     Semaglutide-Weight Management (WEGOVY) 2.4 MG/0.75ML pen, Inject 2.4 mg Subcutaneous once a week, Disp: 9 mL, Rfl: 3     varenicline (CHANTIX CHENCHO) 0.5 MG X 11 & 1 MG X 42 tablet, Take 0.5 mg tab daily for 3 days, THEN 0.5 mg tab twice daily for 4 days, THEN 1 mg twice daily. (Patient not taking: Reported on 5/25/2023), Disp: 53 tablet, Rfl: 0     varenicline (CHANTIX) 1 MG tablet, Take 1 tablet (1 mg) by mouth 2 times daily (Patient not taking: Reported on 5/25/2023), Disp: 60 tablet, Rfl: 1     Vitamin D3 (CHOLECALCIFEROL) 25 mcg (1000 units) tablet, Take by mouth daily, Disp: , Rfl:     Allergies -   Allergies   Allergen Reactions     Percodan [Aspirin]      Percolone [Oxycodone]      Per PT had no problems with this medication       Social History -   Social History     Socioeconomic History     Marital status:    Occupational History     Occupation:      Employer: [employer]   Tobacco Use     Smoking status: Light Smoker     Packs/day: 0.50     Years: 25.00     Pack years: 12.50     Types: Cigarettes     Smokeless tobacco: Current   Vaping Use     Vaping Use: Never used   Substance and Sexual Activity     Alcohol use: Yes     Comment: 3-4 drinks per week     Drug use: No     Sexual activity: Not Currently     Partners: Male     Birth control/protection: Female Surgical, None     Comment: Tubes tied 2006   Other Topics Concern     Parent/sibling w/ CABG, MI or angioplasty before 65F 55M? No       Family History -   Family History   Problem Relation Age of Onset     Macular Degeneration Mother      Arthritis Mother      Lipids Mother      Hypertension Mother      Obesity Mother      Thyroid Disease Mother      Diabetes Mother         Diet controlled     Cardiovascular Father      Hypertension Father       Cerebrovascular Disease Father      Hypertension Brother      Hypertension Brother      Cancer No family hx of      Glaucoma No family hx of      Diabetes No family hx of      Physical Exam  General - The patient is in no distress.  Alert, answers questions and cooperates with examination appropriately.   Voice and Breathing - The patient was breathing comfortably without the use of accessory muscles. There was no wheezing, stridor, or stertor.  The patients voice was clear and strong.   Eyes - Extraocular movements intact. Sclera were not icteric or injected, conjunctiva were pink and moist.  Neurologic - Cranial nerves II-XII are grossly intact. Specifically, the facial nerve is intact, House-Brackmann grade 1 of 6.  Mouth - Examination of the oral cavity showed pink, healthy oral mucosa. No lesions or ulcerations noted.  The tongue was mobile and protrudes midline. I do see clear saliva out of the right Mildred's duct. No stone seen or palpated.   Oropharynx - The walls of the oropharynx were smooth, symmetric, and had no lesions or ulcerations. The tonsils were 2+. No right stones today. The uvula was midline and the palate raised symmetrically.   Neck - submandibular glands are normal. No overlying skin changes. No tenderness. The occipital, submental, submandibular, internal jugular chain, and supraclavicular nodes did not demonstrate any abnormal lymph nodes or masses. Palpation of the thyroid was soft and smooth, with no nodules or goiter appreciated.  The trachea was midline.    A/P - Deepa Anderson is a 53 year old female with right neck swelling which seems like the parotid gland. She had an episode of what seems to have been parotitis (right side). She is better. No stone today. We discussed sialogogues, hydration, and massage. Hot packs if it worsens. Return and consider CT neck.     We discussed a water pick for tonsil stones.  Return if this fails or things worsen.  She is a smoker but do not see any  concerning findings of malignancy.        Rayray Morton MD  Otolaryngology  [facility]        Again, thank you for allowing me to participate in the care of your patient.        Sincerely,        Rayray Morton MD

## 2023-08-03 ENCOUNTER — PATIENT OUTREACH (OUTPATIENT)
Dept: CARE COORDINATION | Facility: CLINIC | Age: 53
End: 2023-08-03
Payer: COMMERCIAL

## 2023-08-10 ENCOUNTER — OFFICE VISIT (OUTPATIENT)
Dept: URGENT CARE | Facility: URGENT CARE | Age: 53
End: 2023-08-10
Payer: COMMERCIAL

## 2023-08-10 VITALS
SYSTOLIC BLOOD PRESSURE: 128 MMHG | BODY MASS INDEX: 36.54 KG/M2 | RESPIRATION RATE: 18 BRPM | TEMPERATURE: 98.8 F | HEART RATE: 72 BPM | OXYGEN SATURATION: 97 % | WEIGHT: 262 LBS | DIASTOLIC BLOOD PRESSURE: 82 MMHG

## 2023-08-10 DIAGNOSIS — R10.13 DYSPEPSIA: Primary | ICD-10-CM

## 2023-08-10 PROCEDURE — 99213 OFFICE O/P EST LOW 20 MIN: CPT | Performed by: PHYSICIAN ASSISTANT

## 2023-08-10 RX ORDER — OMEPRAZOLE 20 MG/1
20 TABLET, DELAYED RELEASE ORAL 2 TIMES DAILY
Qty: 60 TABLET | Refills: 0 | Status: SHIPPED | OUTPATIENT
Start: 2023-08-10 | End: 2023-08-29

## 2023-08-10 NOTE — PROGRESS NOTES
SUBJECTIVE  HPI:  Deepa Anderson is a 53 year old female who presents with the CC of abdominal/pelvic pain.    Pain is located in the epigastric area, with radiation to None.  The pain is characterized as fullness, and at worst is a level mild on a scale of 1-10.  Pain has been present for 2 week(s) and is stable.  EXACERBATING FACTORS: nothing  RELIEVING FACTORS: nothing.  ASSOCIATED SX: belching.    Past Medical History:   Diagnosis Date    Actinic keratosis 08/2010    porokeratosis R hand    Diarrhea 05/26/2015    H. pylori infection 02/04/2014    Impaired fasting glucose     Knee pain     Low back pain     chiropractice    Morbid obesity with BMI of 40.0-44.9, adult (H)     Primary osteoarthritis of both hands     Tear of triangular fibrocartilage of left wrist 12/06/2018     Current Outpatient Medications   Medication Sig Dispense Refill    citalopram (CELEXA) 20 MG tablet Take 1.5 tablets (30 mg) by mouth daily 135 tablet 1    Multiple Vitamins-Minerals (WOMENS 50+ MULTI VITAMIN/MIN PO)       omeprazole (PRILOSEC OTC) 20 MG EC tablet Take 1 tablet (20 mg) by mouth 2 times daily for 30 days 60 tablet 0    omeprazole (PRILOSEC) 20 MG DR capsule Take 1 capsule (20 mg) by mouth daily 90 capsule 3    Semaglutide-Weight Management (WEGOVY) 2.4 MG/0.75ML pen Inject 2.4 mg Subcutaneous once a week 9 mL 3    Vitamin D3 (CHOLECALCIFEROL) 25 mcg (1000 units) tablet Take by mouth daily      loperamide (IMODIUM) 2 MG capsule Take 2 mg by mouth PRN (Patient not taking: Reported on 7/14/2023)      varenicline (CHANTIX CHENCHO) 0.5 MG X 11 & 1 MG X 42 tablet Take 0.5 mg tab daily for 3 days, THEN 0.5 mg tab twice daily for 4 days, THEN 1 mg twice daily. (Patient not taking: Reported on 5/25/2023) 53 tablet 0    varenicline (CHANTIX) 1 MG tablet Take 1 tablet (1 mg) by mouth 2 times daily (Patient not taking: Reported on 5/25/2023) 60 tablet 1     Social History     Tobacco Use    Smoking status: Every Day     Packs/day: 0.50      Years: 35.00     Pack years: 17.50     Types: Cigarettes    Smokeless tobacco: Current    Tobacco comments:     Age 17 to present.    Substance Use Topics    Alcohol use: Yes     Comment: 3-4 drinks per week       ROS:  Review of systems negative except as stated above.    OBJECTIVE:  /82   Pulse 72   Temp 98.8  F (37.1  C) (Tympanic)   Resp 18   Wt 118.8 kg (262 lb)   LMP 05/02/2019 (Approximate)   SpO2 97%   BMI 36.54 kg/m    GENERAL APPEARANCE: healthy, alert and no distress  HENT: ear canals and TM's normal.  Nose and mouth without ulcers, erythema or lesions  RESP: lungs clear to auscultation - no rales, rhonchi or wheezes  CV: regular rates and rhythm, normal S1 S2, no murmur noted  ABDOMEN:  soft, nontender, no HSM or masses and bowel sounds normal  NEURO: Normal strength and tone, sensory exam grossly normal,  normal speech and mentation  SKIN: no suspicious lesions or rashes      ASSESSMENT:  (R10.13) Dyspepsia  (primary encounter diagnosis)  Plan: omeprazole (PRILOSEC OTC) 20 MG EC tablet      Follow up with PCP if symptoms worsen or fail to improve

## 2023-08-29 ENCOUNTER — ANCILLARY PROCEDURE (OUTPATIENT)
Dept: MAMMOGRAPHY | Facility: CLINIC | Age: 53
End: 2023-08-29
Attending: PHYSICIAN ASSISTANT
Payer: COMMERCIAL

## 2023-08-29 ENCOUNTER — OFFICE VISIT (OUTPATIENT)
Dept: SURGERY | Facility: CLINIC | Age: 53
End: 2023-08-29
Payer: COMMERCIAL

## 2023-08-29 VITALS
DIASTOLIC BLOOD PRESSURE: 78 MMHG | BODY MASS INDEX: 36.12 KG/M2 | WEIGHT: 258 LBS | HEIGHT: 71 IN | SYSTOLIC BLOOD PRESSURE: 116 MMHG

## 2023-08-29 DIAGNOSIS — E53.8 LOW SERUM VITAMIN B12: Primary | ICD-10-CM

## 2023-08-29 DIAGNOSIS — E66.812 CLASS 2 OBESITY WITH BODY MASS INDEX (BMI) OF 35.0 TO 35.9 IN ADULT, UNSPECIFIED OBESITY TYPE, UNSPECIFIED WHETHER SERIOUS COMORBIDITY PRESENT: ICD-10-CM

## 2023-08-29 DIAGNOSIS — Z12.31 VISIT FOR SCREENING MAMMOGRAM: ICD-10-CM

## 2023-08-29 PROCEDURE — 99213 OFFICE O/P EST LOW 20 MIN: CPT | Performed by: FAMILY MEDICINE

## 2023-08-29 PROCEDURE — 77067 SCR MAMMO BI INCL CAD: CPT

## 2023-08-29 RX ORDER — PHENTERMINE HYDROCHLORIDE 37.5 MG/1
37.5 CAPSULE ORAL EVERY MORNING
COMMUNITY
End: 2023-09-12 | Stop reason: ALTCHOICE

## 2023-08-29 NOTE — PATIENT INSTRUCTIONS
"\"Sublingual\" or \"rapid dissolve\" B-12 any strength      For Prevention and Treatment of Constipation    From least aggressive to most aggressive:    Move: wallking-the more we move, the more our bowels move  Water: Drink water-64oz+ day  Go when you need to go. Don't wait. The longer you wait, the harder it gets.  Fiber: Fruit, raw veggies, nuts, whole grains,   Stool Softeners: if constipation is mild and for maintenance  Gentle laxatives: Miralax, senokot, dulcolax , Smooth move tea as needed    More aggressive (and typically won't get to this point)  Milk of Magnesia  Mag Citrate (what you drink before a colonoscopy)  Suppositories  Enema  "

## 2023-08-29 NOTE — PROGRESS NOTES
Bariatric Follow-up    53 year old  female BMI:Body mass index is 35.98 kg/m .    Weight:   Wt Readings from Last 1 Encounters:   08/29/23 117 kg (258 lb)    pounds      Comorbidities:  Patient Active Problem List   Diagnosis    History of dysplastic nevus    Acne vulgaris    CARDIOVASCULAR SCREENING; LDL GOAL LESS THAN 160    Tubular adenoma of colon    Tobacco use disorder    Body mass index (BMI) of 40.0-44.9 in adult (H)    Hypertriglyceridemia    Gastroesophageal reflux disease, esophagitis presence not specified    Sedentary lifestyle    Menometrorrhagia    MELANI (generalized anxiety disorder)    Situational anxiety    Perimenopause    Heartburn    Morbid obesity with BMI of 40.0-44.9, adult (H)    Primary osteoarthritis of both hands    Low back pain    Knee pain    Impaired fasting glucose         Interim: Delighted with her 47# weight loss. Walking daily. Joining [employer]    Plan:  DIET  Continue 3 meals, lean proteins, variety   EXERCISE Continue newly established walking, Winter plan and strength training [employer]    PHARMACOTHERAPY Continue Wegovy at 2.4mg, phentermine in am 1 tab    Congratulated on excellent balanced food diary, increased physical activity and protecting sleep in midst of perimenopausal challlenges.     -We reviewed her medications and whether associated with weight gain.  Current Outpatient Medications   Medication    citalopram (CELEXA) 20 MG tablet    cyanocobalamin (VITAMIN B-12) 1000 MCG sublingual tablet    Multiple Vitamins-Minerals (WOMENS 50+ MULTI VITAMIN/MIN PO)    omeprazole (PRILOSEC) 20 MG DR capsule    phentermine (ADIPEX-P) 37.5 MG capsule    Semaglutide-Weight Management (WEGOVY) 2.4 MG/0.75ML pen    Vitamin D3 (CHOLECALCIFEROL) 25 mcg (1000 units) tablet    loperamide (IMODIUM) 2 MG capsule    varenicline (CHANTIX CHENCHO) 0.5 MG X 11 & 1 MG X 42 tablet    varenicline (CHANTIX) 1 MG tablet     No current facility-administered medications for this visit.         We discussed [facility] Bariatric Basics including:  -eating 3 meals daily  -eating protein first  -eating slowly, chewing food well  -avoiding/limiting calorie containing beverages  -choosing wheat, not white with breads, crackers, pastas, regan, bagels, tortillas, rice  -limiting restaurant or cafeteria eating to twice a week or less  -We discussed the importance of restorative sleep and stress management in maintaining a healthy weight.  -We discussed insulin resistance and glycemic index as it relates to appetite and weight control  -We discussed the National Weight Control Registry healthy weight maintenance strategies and ways to optimize metabolism.  -We discussed the importance of physical activity including cardiovascular and strength training in maintaining a healthier weight and explored viable options.    Most recent labs:  Lab Results   Component Value Date    WBC 6.6 04/04/2023    HGB 14.6 04/04/2023    HCT 43.6 04/04/2023    MCV 91 04/04/2023     04/04/2023     Lab Results   Component Value Date    CHOL 213 (H) 09/27/2022     Lab Results   Component Value Date    HDL 60 09/27/2022       Lab Results   Component Value Date    TRIG 129 09/27/2022       Lab Results   Component Value Date    ALT 13 04/04/2023    AST 23 04/04/2023    ALKPHOS 99 04/04/2023       Lab Results   Component Value Date    TSH 1.44 04/04/2023       DIETARY HISTORY  Meals Per Day: 3  Eating Protein First?: yes  Food Diary: B:shake coffee L:breakfast bowl D:low carb regan with chicken and SF BB sauce  Snacks Per Day: no  Typical Snack: protein bar. Fruit daily  Fluid Intake: intentional  Portion Control: mproved  Calorie Containing Beverages: no  Alcohol per week: fewer  Typical Protein Food Choices: variety lean  Choosing Whole Grains: yes  Grocery Shopping is done by: herself  Food Preparation is done by: herself  Meals at Restaurant/Cafeteria/Take Out Per Week: 0-1  Eating at the Table:   TV is Off During Meals:  "    Positive Changes Since Last Visit: portions and foods  Struggling With:     Knowledgeable in Reading Food Labels: yes  Getting Adequate Protein: yes  Sleeping 7-8 hours/day some perimenopausal awakenings. Ave 7 hours  Stress management OK    PHYSICAL ACTIVITY PATTERNS:  Cardiovascular: walking joining [employer]  Strength Training: see above    REVIEW OF SYSTEMS    PHYSICAL EXAM:  Vitals: /78 (BP Location: Right arm, Patient Position: Sitting)   Ht 1.803 m (5' 11\")   Wt 117 kg (258 lb)   LMP 05/02/2019 (Approximate)   BMI 35.98 kg/m        GEN: Pleasant, well groomed, in no acute distress  EYES: EOMI,  ENT: airway patent  NECK: no carotid bruits, no anterior/supraclavicular lymphadenopathy, thyroid normal   HEART: Rhythm regular, rate regular, no murmur   LUNGS: Clear  ABDOMEN: soft, non-tender, obese, no rashes   VASCULAR: no  lower extremity edema  MUSCULOSKELETAL:  muscle mass OK  SKIN:  no color changes of venous stasis, no ulcerations    Total time spent on the date of this encounter doing: chart review, review of test results, patient visit, physical exam, education, counseling, developing plan of care, and documenting = 25 minutes.          "

## 2023-08-29 NOTE — LETTER
8/29/2023         RE: Deepa Anderson  [address] [facility] [address]        Dear Colleague,    Thank you for referring your patient, Deepa Anderson, to the [facility] [place]. Please see a copy of my visit note below.    Bariatric Follow-up    53 year old  female BMI:Body mass index is 35.98 kg/m .    Weight:   Wt Readings from Last 1 Encounters:   08/29/23 117 kg (258 lb)    pounds      Comorbidities:  Patient Active Problem List   Diagnosis     History of dysplastic nevus     Acne vulgaris     CARDIOVASCULAR SCREENING; LDL GOAL LESS THAN 160     Tubular adenoma of colon     Tobacco use disorder     Body mass index (BMI) of 40.0-44.9 in adult (H)     Hypertriglyceridemia     Gastroesophageal reflux disease, esophagitis presence not specified     Sedentary lifestyle     Menometrorrhagia     MELANI (generalized anxiety disorder)     Situational anxiety     Perimenopause     Heartburn     Morbid obesity with BMI of 40.0-44.9, adult (H)     Primary osteoarthritis of both hands     Low back pain     Knee pain     Impaired fasting glucose         Interim: Delighted with her 47# weight loss. Walking daily. Joining [employer]    Plan:  DIET  Continue 3 meals, lean proteins, variety   EXERCISE Continue newly established walking, Winter plan and strength training [employer]    PHARMACOTHERAPY Continue Wegovy at 2.4mg, phentermine in am 1 tab    Congratulated on excellent balanced food diary, increased physical activity and protecting sleep in midst of perimenopausal challlenges.     -We reviewed her medications and whether associated with weight gain.  Current Outpatient Medications   Medication     citalopram (CELEXA) 20 MG tablet     cyanocobalamin (VITAMIN B-12) 1000 MCG sublingual tablet     Multiple Vitamins-Minerals (WOMENS 50+ MULTI VITAMIN/MIN PO)     omeprazole (PRILOSEC) 20 MG DR capsule     phentermine (ADIPEX-P) 37.5 MG capsule      Semaglutide-Weight Management (WEGOVY) 2.4 MG/0.75ML pen     Vitamin D3 (CHOLECALCIFEROL) 25 mcg (1000 units) tablet     loperamide (IMODIUM) 2 MG capsule     varenicline (CHANTIX CHENCHO) 0.5 MG X 11 & 1 MG X 42 tablet     varenicline (CHANTIX) 1 MG tablet     No current facility-administered medications for this visit.        We discussed [facility] Bariatric Basics including:  -eating 3 meals daily  -eating protein first  -eating slowly, chewing food well  -avoiding/limiting calorie containing beverages  -choosing wheat, not white with breads, crackers, pastas, regan, bagels, tortillas, rice  -limiting restaurant or cafeteria eating to twice a week or less  -We discussed the importance of restorative sleep and stress management in maintaining a healthy weight.  -We discussed insulin resistance and glycemic index as it relates to appetite and weight control  -We discussed the National Weight Control Registry healthy weight maintenance strategies and ways to optimize metabolism.  -We discussed the importance of physical activity including cardiovascular and strength training in maintaining a healthier weight and explored viable options.    Most recent labs:  Lab Results   Component Value Date    WBC 6.6 04/04/2023    HGB 14.6 04/04/2023    HCT 43.6 04/04/2023    MCV 91 04/04/2023     04/04/2023     Lab Results   Component Value Date    CHOL 213 (H) 09/27/2022     Lab Results   Component Value Date    HDL 60 09/27/2022       Lab Results   Component Value Date    TRIG 129 09/27/2022       Lab Results   Component Value Date    ALT 13 04/04/2023    AST 23 04/04/2023    ALKPHOS 99 04/04/2023       Lab Results   Component Value Date    TSH 1.44 04/04/2023       DIETARY HISTORY  Meals Per Day: 3  Eating Protein First?: yes  Food Diary: B:shake coffee L:breakfast bowl D:low carb regan with chicken and SF BB sauce  Snacks Per Day: no  Typical Snack: protein bar. Fruit daily  Fluid Intake: intentional  Portion Control:  "mproved  Calorie Containing Beverages: no  Alcohol per week: fewer  Typical Protein Food Choices: variety lean  Choosing Whole Grains: yes  Grocery Shopping is done by: herself  Food Preparation is done by: herself  Meals at Restaurant/Cafeteria/Take Out Per Week: 0-1  Eating at the Table:   TV is Off During Meals:     Positive Changes Since Last Visit: portions and foods  Struggling With:     Knowledgeable in Reading Food Labels: yes  Getting Adequate Protein: yes  Sleeping 7-8 hours/day some perimenopausal awakenings. Ave 7 hours  Stress management OK    PHYSICAL ACTIVITY PATTERNS:  Cardiovascular: walking joining [employer]  Strength Training: see above    REVIEW OF SYSTEMS    PHYSICAL EXAM:  Vitals: /78 (BP Location: Right arm, Patient Position: Sitting)   Ht 1.803 m (5' 11\")   Wt 117 kg (258 lb)   LMP 05/02/2019 (Approximate)   BMI 35.98 kg/m        GEN: Pleasant, well groomed, in no acute distress  EYES: EOMI,  ENT: airway patent  NECK: no carotid bruits, no anterior/supraclavicular lymphadenopathy, thyroid normal   HEART: Rhythm regular, rate regular, no murmur   LUNGS: Clear  ABDOMEN: soft, non-tender, obese, no rashes   VASCULAR: no  lower extremity edema  MUSCULOSKELETAL:  muscle mass OK  SKIN:  no color changes of venous stasis, no ulcerations    Total time spent on the date of this encounter doing: chart review, review of test results, patient visit, physical exam, education, counseling, developing plan of care, and documenting = 25 minutes.            Again, thank you for allowing me to participate in the care of your patient.        Sincerely,        Melisa José MD  "

## 2023-09-07 ENCOUNTER — MYC MEDICAL ADVICE (OUTPATIENT)
Dept: FAMILY MEDICINE | Facility: CLINIC | Age: 53
End: 2023-09-07
Payer: COMMERCIAL

## 2023-09-07 DIAGNOSIS — F41.8 SITUATIONAL ANXIETY: ICD-10-CM

## 2023-09-07 DIAGNOSIS — N95.1 PERIMENOPAUSE: ICD-10-CM

## 2023-09-07 RX ORDER — CITALOPRAM HYDROBROMIDE 20 MG/1
30 TABLET ORAL DAILY
Qty: 135 TABLET | Refills: 0 | Status: SHIPPED | OUTPATIENT
Start: 2023-09-07 | End: 2023-10-30

## 2023-09-11 ENCOUNTER — MYC REFILL (OUTPATIENT)
Dept: FAMILY MEDICINE | Facility: CLINIC | Age: 53
End: 2023-09-11
Payer: COMMERCIAL

## 2023-09-11 RX ORDER — PHENTERMINE HYDROCHLORIDE 37.5 MG/1
37.5 CAPSULE ORAL EVERY MORNING
OUTPATIENT
Start: 2023-09-11

## 2023-09-12 ENCOUNTER — MYC MEDICAL ADVICE (OUTPATIENT)
Dept: SURGERY | Facility: CLINIC | Age: 53
End: 2023-09-12

## 2023-09-12 ENCOUNTER — VIRTUAL VISIT (OUTPATIENT)
Dept: SURGERY | Facility: CLINIC | Age: 53
End: 2023-09-12
Payer: COMMERCIAL

## 2023-09-12 DIAGNOSIS — E66.812 CLASS 2 OBESITY DUE TO EXCESS CALORIES WITHOUT SERIOUS COMORBIDITY WITH BODY MASS INDEX (BMI) OF 35.0 TO 35.9 IN ADULT: Primary | ICD-10-CM

## 2023-09-12 DIAGNOSIS — E66.09 CLASS 2 OBESITY DUE TO EXCESS CALORIES WITHOUT SERIOUS COMORBIDITY WITH BODY MASS INDEX (BMI) OF 35.0 TO 35.9 IN ADULT: Primary | ICD-10-CM

## 2023-09-12 DIAGNOSIS — Z71.3 NUTRITIONAL COUNSELING: ICD-10-CM

## 2023-09-12 PROCEDURE — 97803 MED NUTRITION INDIV SUBSEQ: CPT | Mod: VID | Performed by: DIETITIAN, REGISTERED

## 2023-09-12 RX ORDER — PHENTERMINE HYDROCHLORIDE 37.5 MG/1
37.5 CAPSULE ORAL EVERY MORNING
Qty: 90 CAPSULE | Refills: 1 | OUTPATIENT
Start: 2023-09-12

## 2023-09-12 RX ORDER — PHENTERMINE HYDROCHLORIDE 37.5 MG/1
18.75-37.5 TABLET ORAL
Qty: 90 TABLET | Refills: 1 | Status: SHIPPED | OUTPATIENT
Start: 2023-09-12 | End: 2024-03-26

## 2023-09-12 NOTE — LETTER
9/12/2023         RE: Deepa Anderson  [address] [facility] [address]        Dear Colleague,    Thank you for referring your patient, Deepa Anderson, to the [facility] [place]. Please see a copy of my visit note below.    Deepa Anderson is a 53 year old who is being evaluated via a billable video visit.    How would you like to obtain your AVS? MyChart  If the video visit is dropped, the invitation should be resent by: Send to e-mail at: [email or web address]  Will anyone else be joining your video visit? No        Medical  Weight Loss Follow-Up Diet Evaluation  Assessment:  Deepa is presenting today for a follow up weight management nutrition consultation.  This patient has had an initial appointment and was referred by Dr. José for MNT as treatment for Obesity.     Weight loss medication: Phentermine. [place]  Pt's weight is 255 lbs   Initial weight: 305 lbs   Weight change: 50 lbs (down)         5/18/2023     1:11 PM   Changes and Difficulties   I have made the following changes to my diet since my last visit: Sugar free drinks, eating breakfast daily, portion limits, mindful of carbs/fat/calories   With regards to my diet, I am still struggling with: Getting more balanced meals   I have made the following changes to my activity/exercise since my last visit: Much more active and walking on lunch breaks   With regards to my activity/exercise, I am still struggling with: Adding in weight lifting exercises     BMI: There is no height or weight on file to calculate BMI.  Ideal body weight: 70.8 kg (156 lb 1.4 oz)  Adjusted ideal body weight: 89.3 kg (196 lb 13.6 oz)    Estimated RMR ([place]-St Jeor equation):   1872 kcals x 1.3 (light active) = 2434 kcals (for weight maintenance)     Recommended Protein Intake: 60-80 grams of protein/day  Patient Active Problem List:  Patient Active Problem List   Diagnosis     History of dysplastic nevus     Acne vulgaris      CARDIOVASCULAR SCREENING; LDL GOAL LESS THAN 160     Tubular adenoma of colon     Tobacco use disorder     Body mass index (BMI) of 40.0-44.9 in adult (H)     Hypertriglyceridemia     Gastroesophageal reflux disease, esophagitis presence not specified     Sedentary lifestyle     Menometrorrhagia     MELANI (generalized anxiety disorder)     Situational anxiety     Perimenopause     Heartburn     Morbid obesity with BMI of 40.0-44.9, adult (H)     Primary osteoarthritis of both hands     Low back pain     Knee pain     Impaired fasting glucose     Diabetes: No     Progress on goals from last visit: Feels that things are going well.  Continues to focus on protein first at each meal.  Patient reports that she is being mindful in regards to making healthy food choices as well as portion control.      Dietary Recall:  Breakfast: Protein Shake, Iced Coffee  Lunch:salad with protein (chicken, walnuts, cheese), apple   Dinner:pulled chicken, salad, greek yogurt with whipped cream and fruit   Typical snacks: occasional trail mix  Beverages: water-continues to push intake, consuming 64 oz/day +  Exercise: walking most days of the week    Nutrition Diagnosis:    Overweight/Obesity (NC 3.3) related to overeating and poor lifestyle habits as evidenced by patient's subjective statements and BMI of 35.7 kg/m2.     Intervention:  Food and/or nutrient delivery: balanced meals, adequate protein   Nutrition education: none   Nutrition counseling: provided support and encouragement       Monitoring/Evaluation:    Goals:  Continue to focus on protein first at each meal, aiming for 20-30 grams of protein/meal.   Look into gym membership as alternative option for exercise once the weather changes.    Patient to follow up in 4 month(s) with bariatrician         Video-Visit Details    Type of service:  Video Visit    Video Start Time (time video started): 10:20 am     Video End Time (time video stopped): 10:30 am     Originating Location  (pt. Location): Home      Distant Location (provider location):  Off-site    Mode of Communication:  Video Conference via [facility]    Physician has received verbal consent for a Video Visit from the patient? Yes      Paloma Hayes RD          Again, thank you for allowing me to participate in the care of your patient.        Sincerely,        Paloma Hayes RD

## 2023-09-12 NOTE — PROGRESS NOTES
Deepa Anderson is a 53 year old who is being evaluated via a billable video visit.    How would you like to obtain your AVS? MyChart  If the video visit is dropped, the invitation should be resent by: Send to e-mail at: [email or web address]  Will anyone else be joining your video visit? No        Medical  Weight Loss Follow-Up Diet Evaluation  Assessment:  Deepa is presenting today for a follow up weight management nutrition consultation.  This patient has had an initial appointment and was referred by Dr. José for MNT as treatment for Obesity.     Weight loss medication: Phentermine. [place]  Pt's weight is 255 lbs   Initial weight: 305 lbs   Weight change: 50 lbs (down)         5/18/2023     1:11 PM   Changes and Difficulties   I have made the following changes to my diet since my last visit: Sugar free drinks, eating breakfast daily, portion limits, mindful of carbs/fat/calories   With regards to my diet, I am still struggling with: Getting more balanced meals   I have made the following changes to my activity/exercise since my last visit: Much more active and walking on lunch breaks   With regards to my activity/exercise, I am still struggling with: Adding in weight lifting exercises     BMI: There is no height or weight on file to calculate BMI.  Ideal body weight: 70.8 kg (156 lb 1.4 oz)  Adjusted ideal body weight: 89.3 kg (196 lb 13.6 oz)    Estimated RMR ([place]-St Jeor equation):   1872 kcals x 1.3 (light active) = 2434 kcals (for weight maintenance)     Recommended Protein Intake: 60-80 grams of protein/day  Patient Active Problem List:  Patient Active Problem List   Diagnosis    History of dysplastic nevus    Acne vulgaris    CARDIOVASCULAR SCREENING; LDL GOAL LESS THAN 160    Tubular adenoma of colon    Tobacco use disorder    Body mass index (BMI) of 40.0-44.9 in adult (H)    Hypertriglyceridemia    Gastroesophageal reflux disease, esophagitis presence not specified    Sedentary lifestyle     Menometrorrhagia    MELANI (generalized anxiety disorder)    Situational anxiety    Perimenopause    Heartburn    Morbid obesity with BMI of 40.0-44.9, adult (H)    Primary osteoarthritis of both hands    Low back pain    Knee pain    Impaired fasting glucose     Diabetes: No     Progress on goals from last visit: Feels that things are going well.  Continues to focus on protein first at each meal.  Patient reports that she is being mindful in regards to making healthy food choices as well as portion control.      Dietary Recall:  Breakfast: Protein Shake, Iced Coffee  Lunch:salad with protein (chicken, walnuts, cheese), apple   Dinner:pulled chicken, salad, greek yogurt with whipped cream and fruit   Typical snacks: occasional trail mix  Beverages: water-continues to push intake, consuming 64 oz/day +  Exercise: walking most days of the week    Nutrition Diagnosis:    Overweight/Obesity (NC 3.3) related to overeating and poor lifestyle habits as evidenced by patient's subjective statements and BMI of 35.7 kg/m2.     Intervention:  Food and/or nutrient delivery: balanced meals, adequate protein   Nutrition education: none   Nutrition counseling: provided support and encouragement       Monitoring/Evaluation:    Goals:  Continue to focus on protein first at each meal, aiming for 20-30 grams of protein/meal.   Look into gym membership as alternative option for exercise once the weather changes.    Patient to follow up in 4 month(s) with bariatrician         Video-Visit Details    Type of service:  Video Visit    Video Start Time (time video started): 10:20 am     Video End Time (time video stopped): 10:30 am     Originating Location (pt. Location): Home      Distant Location (provider location):  Off-site    Mode of Communication:  Video Conference via [facility]    Physician has received verbal consent for a Video Visit from the patient? Yes      Paloma Hayes, SANTOS

## 2023-10-25 ASSESSMENT — ENCOUNTER SYMPTOMS
ABDOMINAL PAIN: 0
CHILLS: 0
NERVOUS/ANXIOUS: 0
WEAKNESS: 0
BREAST MASS: 0
DYSURIA: 0
JOINT SWELLING: 0
COUGH: 0
FREQUENCY: 0
SORE THROAT: 0
MYALGIAS: 1
EYE PAIN: 0
CONSTIPATION: 1
HEMATOCHEZIA: 0
ARTHRALGIAS: 0
SHORTNESS OF BREATH: 0
NAUSEA: 0
PARESTHESIAS: 0
HEARTBURN: 0
FEVER: 0
HEMATURIA: 0
HEADACHES: 0
DIARRHEA: 0
DIZZINESS: 0
PALPITATIONS: 0

## 2023-10-30 ENCOUNTER — OFFICE VISIT (OUTPATIENT)
Dept: FAMILY MEDICINE | Facility: CLINIC | Age: 53
End: 2023-10-30
Payer: COMMERCIAL

## 2023-10-30 VITALS
TEMPERATURE: 97.7 F | WEIGHT: 242 LBS | DIASTOLIC BLOOD PRESSURE: 82 MMHG | BODY MASS INDEX: 33.88 KG/M2 | HEIGHT: 71 IN | RESPIRATION RATE: 18 BRPM | HEART RATE: 87 BPM | SYSTOLIC BLOOD PRESSURE: 127 MMHG | OXYGEN SATURATION: 98 %

## 2023-10-30 DIAGNOSIS — Z00.00 ROUTINE GENERAL MEDICAL EXAMINATION AT A HEALTH CARE FACILITY: Primary | ICD-10-CM

## 2023-10-30 DIAGNOSIS — F41.8 SITUATIONAL ANXIETY: ICD-10-CM

## 2023-10-30 DIAGNOSIS — G47.09 OTHER INSOMNIA: ICD-10-CM

## 2023-10-30 DIAGNOSIS — F17.200 TOBACCO USE DISORDER: ICD-10-CM

## 2023-10-30 PROCEDURE — 99396 PREV VISIT EST AGE 40-64: CPT | Performed by: PHYSICIAN ASSISTANT

## 2023-10-30 PROCEDURE — 99214 OFFICE O/P EST MOD 30 MIN: CPT | Mod: 25 | Performed by: PHYSICIAN ASSISTANT

## 2023-10-30 RX ORDER — CITALOPRAM HYDROBROMIDE 20 MG/1
30 TABLET ORAL DAILY
Qty: 135 TABLET | Refills: 3 | Status: SHIPPED | OUTPATIENT
Start: 2023-10-30

## 2023-10-30 RX ORDER — HYDROXYZINE HYDROCHLORIDE 10 MG/1
TABLET, FILM COATED ORAL
Qty: 90 TABLET | Refills: 1 | Status: SHIPPED | OUTPATIENT
Start: 2023-10-30

## 2023-10-30 ASSESSMENT — ENCOUNTER SYMPTOMS
NERVOUS/ANXIOUS: 0
WEAKNESS: 0
DIZZINESS: 0
FEVER: 0
SHORTNESS OF BREATH: 0
SORE THROAT: 0
FREQUENCY: 0
COUGH: 0
BREAST MASS: 0
HEARTBURN: 0
HEMATOCHEZIA: 0
EYE PAIN: 0
DIARRHEA: 0
PARESTHESIAS: 0
HEADACHES: 0
HEMATURIA: 0
JOINT SWELLING: 0
CONSTIPATION: 1
DYSURIA: 0
ABDOMINAL PAIN: 0
MYALGIAS: 1
NAUSEA: 0
ARTHRALGIAS: 0
PALPITATIONS: 0
CHILLS: 0

## 2023-10-30 ASSESSMENT — PAIN SCALES - GENERAL: PAINLEVEL: MILD PAIN (3)

## 2023-10-30 NOTE — PROGRESS NOTES
SUBJECTIVE:   CC: Deepa is an 53 year old who presents for preventive health visit.       10/30/2023     8:52 AM   Additional Questions   Roomed by Branden FLOWERS MA       Healthy Habits:     Getting at least 3 servings of Calcium per day:  Yes    Bi-annual eye exam:  Yes    Dental care twice a year:  Yes    Sleep apnea or symptoms of sleep apnea:  None    Diet:  Regular (no restrictions), Low fat/cholesterol, Carbohydrate counting and Gluten-free/reduced    Frequency of exercise:  4-5 days/week    Duration of exercise:  30-45 minutes    Taking medications regularly:  Yes    Medication side effects:  Other    Additional concerns today:  Yes    Problems to add:  Insomnia: having difficulty waking in the middle of the night and unable to get back to sleep. She has tried multiple over the counter medications, melatonin, supplements, THC and CBD with no relief.   Anxiety: well controlled managed with citalopram 30 mg daily and will need refills. She has had situational stressors with her 's health, but overall, feeling well controlled.   Obesity: managed by Weight Loss clinic. She has been on phentermin and wegovy and had success. She is walking regularly and feeling good.       Today's PHQ-2 Score:       10/30/2023     8:47 AM   PHQ-2 ( 1999 [employer])   Q1: Little interest or pleasure in doing things 0   Q2: Feeling down, depressed or hopeless 0   PHQ-2 Score 0   Q1: Little interest or pleasure in doing things Not at all   Q2: Feeling down, depressed or hopeless Not at all   PHQ-2 Score 0             Social History     Tobacco Use    Smoking status: Every Day     Packs/day: 0.50     Years: 35.00     Additional pack years: 0.00     Total pack years: 17.50     Types: Cigarettes    Smokeless tobacco: Current    Tobacco comments:     Age 17 to present.    Substance Use Topics    Alcohol use: Yes     Comment: 3-4 drinks per week             10/25/2023     9:16 AM   Alcohol Use   Prescreen: >3 drinks/day or >7 drinks/week?  No         9/27/2022     8:41 AM   AUDIT - Alcohol Use Disorders Identification Test - Reproduced from the World Health Organization Audit 2001 (Second Edition)   1.  How often do you have a drink containing alcohol? 2 to 4 times a month   2.  How many drinks containing alcohol do you have on a typical day when you are drinking? 1 or 2   3.  How often do you have five or more drinks on one occasion? Weekly   4.  How often during the last year have you found that you were not able to stop drinking once you had started? Never   5.  How often during the last year have you failed to do what was normally expected of you because of drinking? Never   6.  How often during the last year have you needed a first drink in the morning to get yourself going after a heavy drinking session? Never   7.  How often during the last year have you had a feeling of guilt or remorse after drinking? Never   8.  How often during the last year have you been unable to remember what happened the night before because of your drinking? Never   9.  Have you or someone else been injured because of your drinking? No   10. Has a relative, friend, doctor or other health care worker been concerned about your drinking or suggested you cut down? No   TOTAL SCORE 5     Reviewed orders with patient.  Reviewed health maintenance and updated orders accordingly - Yes  BP Readings from Last 3 Encounters:   10/30/23 127/82   08/29/23 116/78   08/10/23 128/82    Wt Readings from Last 3 Encounters:   10/30/23 109.8 kg (242 lb)   08/29/23 117 kg (258 lb)   08/10/23 118.8 kg (262 lb)                  Patient Active Problem List   Diagnosis    History of dysplastic nevus    Acne vulgaris    CARDIOVASCULAR SCREENING; LDL GOAL LESS THAN 160    Tubular adenoma of colon    Tobacco use disorder    Body mass index (BMI) of 40.0-44.9 in adult (H)    Hypertriglyceridemia    Gastroesophageal reflux disease, esophagitis presence not specified    Sedentary lifestyle     Menometrorrhagia    MELANI (generalized anxiety disorder)    Situational anxiety    Perimenopause    Heartburn    Morbid obesity with BMI of 40.0-44.9, adult (H)    Primary osteoarthritis of both hands    Low back pain    Knee pain    Impaired fasting glucose     Past Surgical History:   Procedure Laterality Date    BIOPSY  2015    polyp removed     SECTION       births     SECTION  2006    CHOLECYSTECTOMY  06/15/2016    COLONOSCOPY  2015    COMBINED ESOPHAGOSCOPY, GASTROSCOPY, DUODENOSCOPY (EGD) WITH CO2 INSUFFLATION N/A 2017    Procedure: COMBINED ESOPHAGOSCOPY, GASTROSCOPY, DUODENOSCOPY (EGD) WITH CO2 INSUFFLATION;  EGD-MASS OF NECK/ DELL;  Surgeon: Sohan Renee MD;  Location: [facility]    CYSTECTOMY PILONIDAL  2005    EXCISE MASS NECK Left 2017    Procedure: EXCISE MASS NECK;;  Surgeon: Rayray Morton MD;  Location: [facility]    LAPAROSCOPIC HYSTERECTOMY TOTAL N/A 2019    TLH with BL salpingectomy    LARYNGOSCOPY WITH BIOPSY(IES) N/A 2017    Procedure: LARYNGOSCOPY WITH BIOPSY(IES);  Direct laryngoscopy with biopsy and excision of left neck mass;  Surgeon: Rayray Morton MD;  Location: [facility]    TUBAL LIGATION      WRIST SURGERY Left 2018    TFCC repair, removal of bone chip at [facility] in [place]       Social History     Tobacco Use    Smoking status: Every Day     Packs/day: 0.50     Years: 35.00     Additional pack years: 0.00     Total pack years: 17.50     Types: Cigarettes    Smokeless tobacco: Current    Tobacco comments:     Age 17 to present.    Substance Use Topics    Alcohol use: Yes     Comment: 3-4 drinks per week     Family History   Problem Relation Age of Onset    Macular Degeneration Mother     Arthritis Mother     Lipids Mother     Hypertension Mother     Obesity Mother     Thyroid Disease Mother     Diabetes Mother         Diet controlled    Cardiovascular Father     Hypertension Father     Cerebrovascular Disease Father      Hypertension Brother     Hypertension Brother     Cancer No family hx of     Glaucoma No family hx of     Diabetes No family hx of          Current Outpatient Medications   Medication Sig Dispense Refill    citalopram (CELEXA) 20 MG tablet Take 1.5 tablets (30 mg) by mouth daily 135 tablet 3    cyanocobalamin (VITAMIN B-12) 1000 MCG sublingual tablet Place 1 tablet (1,000 mcg) under the tongue daily 90 tablet 3    hydrOXYzine (ATARAX) 10 MG tablet 1-2 tablets at night time for sleep as needed. 90 tablet 1    loperamide (IMODIUM) 2 MG capsule Take 2 mg by mouth PRN      Multiple Vitamins-Minerals (WOMENS 50+ MULTI VITAMIN/MIN PO)       omeprazole (PRILOSEC) 20 MG DR capsule Take 1 capsule (20 mg) by mouth daily 90 capsule 3    phentermine (ADIPEX-P) 37.5 MG tablet Take 0.5-1 tablets (18.75-37.5 mg) by mouth every morning (before breakfast) for 180 days 90 tablet 1    Semaglutide-Weight Management (WEGOVY) 2.4 MG/0.75ML pen Inject 2.4 mg Subcutaneous once a week 9 mL 3    Vitamin D3 (CHOLECALCIFEROL) 25 mcg (1000 units) tablet Take by mouth daily      varenicline (CHANTIX CHENCHO) 0.5 MG X 11 & 1 MG X 42 tablet Take 0.5 mg tab daily for 3 days, THEN 0.5 mg tab twice daily for 4 days, THEN 1 mg twice daily. 53 tablet 0    varenicline (CHANTIX) 1 MG tablet Take 1 tablet (1 mg) by mouth 2 times daily 60 tablet 1     Allergies   Allergen Reactions    Percodan [Aspirin]     Percolone [Oxycodone]      Per PT had no problems with this medication     Recent Labs   Lab Test 04/04/23  1032 09/27/22  0953 06/10/19  1209 04/03/19  0813 01/31/18  0858 12/20/16  0934 12/20/16  0934 06/29/16  0904   A1C 5.5  --   --   --   --   --   --   --    LDL  --  127*  --   --  104*  --  103*  --    HDL  --  60  --   --  58  --  61  --    TRIG  --  129  --   --  160*  --  114  --    ALT 13  --   --  18  --   --   --  71*   CR 0.84  --   --   --   --   --   --  0.69   GFRESTIMATED 84  --   --   --   --   --   --  >90  Non  GFR  Calc     GFRESTBLACK  --   --   --   --   --   --   --  >90  African American GFR Calc     POTASSIUM 4.8  --   --   --   --   --   --  4.4   TSH 1.44  --  2.10  --   --    < > 1.59  --     < > = values in this interval not displayed.        Breast Cancer Screening:    FHS-7:       8/5/2022     3:45 PM 8/29/2023    10:34 AM   Breast CA Risk Assessment (FHS-7)   Did any of your first-degree relatives have breast or ovarian cancer? No No   Did any of your relatives have bilateral breast cancer? No No   Did any man in your family have breast cancer? No No   Did any woman in your family have breast and ovarian cancer? No No   Did any woman in your family have breast cancer before age 50 y? No No   Do you have 2 or more relatives with breast and/or ovarian cancer? No No   Do you have 2 or more relatives with breast and/or bowel cancer? No No       Mammogram Screening: Recommended annual mammography  Pertinent mammograms are reviewed under the imaging tab.    History of abnormal Pap smear: Status post benign hysterectomy. Health Maintenance and Surgical History updated.      Latest Ref Rng & Units 1/31/2018    10:43 AM 1/31/2018     8:40 AM 11/18/2013    12:00 AM   PAP / HPV   PAP (Historical)  NIL   NIL    HPV 16 DNA NEG^Negative  Negative     HPV 18 DNA NEG^Negative  Negative     Other HR HPV NEG^Negative  Negative       Reviewed and updated as needed this visit by clinical staff   Tobacco  Allergies  Meds              Reviewed and updated as needed this visit by Provider                 Past Medical History:   Diagnosis Date    Actinic keratosis 08/2010    porokeratosis R hand    Arthritis     Diarrhea 05/26/2015    H. pylori infection 02/04/2014    Impaired fasting glucose     Knee pain     Low back pain     chiropractice    Morbid obesity with BMI of 40.0-44.9, adult (H)     Primary osteoarthritis of both hands     Tear of triangular fibrocartilage of left wrist 12/06/2018      Past Surgical History:   Procedure  Laterality Date    BIOPSY  2015    polyp removed     SECTION  2002     births     SECTION  2006    CHOLECYSTECTOMY  06/15/2016    COLONOSCOPY  2015    COMBINED ESOPHAGOSCOPY, GASTROSCOPY, DUODENOSCOPY (EGD) WITH CO2 INSUFFLATION N/A 2017    Procedure: COMBINED ESOPHAGOSCOPY, GASTROSCOPY, DUODENOSCOPY (EGD) WITH CO2 INSUFFLATION;  EGD-MASS OF NECK/ DELL;  Surgeon: Sohan Renee MD;  [facility]    CYSTECTOMY PILONIDAL  2005    EXCISE MASS NECK Left 2017    Procedure: EXCISE MASS NECK;;  Surgeon: Rayray Morton MD;  [facility]    LAPAROSCOPIC HYSTERECTOMY TOTAL N/A 2019    TLH with BL salpingectomy    LARYNGOSCOPY WITH BIOPSY(IES) N/A 2017    Procedure: LARYNGOSCOPY WITH BIOPSY(IES);  Direct laryngoscopy with biopsy and excision of left neck mass;  Surgeon: Rayray Morton MD;  [facility]: [facility]    TUBAL LIGATION      WRIST SURGERY Left 2018    TFCC repair, removal of bone chip at [facility] in [place]     OB History    Para Term  AB Living   2 2 2 0 0 2   SAB IAB Ectopic Multiple Live Births   0 0 0 0 2      # Outcome Date GA Lbr Rodger/2nd Weight Sex Delivery Anes PTL Lv   2 Term 06    M -SEC   DOROTA   1 Term 02    F -SEC   DOROTA       Review of Systems   Constitutional:  Negative for chills and fever.   HENT:  Negative for congestion, ear pain, hearing loss and sore throat.    Eyes:  Negative for pain and visual disturbance.   Respiratory:  Negative for cough and shortness of breath.    Cardiovascular:  Negative for chest pain, palpitations and peripheral edema.   Gastrointestinal:  Positive for constipation. Negative for abdominal pain, diarrhea, heartburn, hematochezia and nausea.   Breasts:  Negative for tenderness, breast mass and discharge.   Genitourinary:  Negative for dysuria, frequency, genital sores, hematuria, pelvic pain, urgency, vaginal bleeding and vaginal discharge.  "  Musculoskeletal:  Positive for myalgias. Negative for arthralgias and joint swelling.   Skin:  Negative for rash.   Neurological:  Negative for dizziness, weakness, headaches and paresthesias.   Psychiatric/Behavioral:  Negative for mood changes. The patient is not nervous/anxious.           OBJECTIVE:   /82   Pulse 87   Temp 97.7  F (36.5  C) (Tympanic)   Resp 18   Ht 1.803 m (5' 11\")   Wt 109.8 kg (242 lb)   LMP 05/02/2019 (Approximate)   SpO2 98%   Breastfeeding No   BMI 33.75 kg/m    Physical Exam  GENERAL APPEARANCE: healthy, alert and no distress  EYES: Eyes grossly normal to inspection, PERRL and conjunctivae and sclerae normal  HENT: ear canals and TM's normal, nose and mouth without ulcers or lesions, oropharynx clear and oral mucous membranes moist  NECK: no adenopathy, no asymmetry, masses, or scars and thyroid normal to palpation  RESP: lungs clear to auscultation - no rales, rhonchi or wheezes  BREAST: normal without masses, tenderness or nipple discharge and no palpable axillary masses or adenopathy  CV: regular rate and rhythm, normal S1 S2, no S3 or S4, no murmur, click or rub, no peripheral edema and peripheral pulses strong  ABDOMEN: soft, nontender, no hepatosplenomegaly, no masses and bowel sounds normal  MS: no musculoskeletal defects are noted and gait is age appropriate without ataxia  SKIN: no suspicious lesions or rashes  NEURO: Normal strength and tone, sensory exam grossly normal, mentation intact and speech normal  PSYCH: mentation appears normal and affect normal/bright    Diagnostic Test Results:  Labs reviewed in Epic    ASSESSMENT/PLAN:   (Z00.00) Routine general medical examination at a health care facility  (primary encounter diagnosis)  Comment: Health maintenance reviewed and updated.  Defers immunizations.     (F41.8) Situational anxiety  Stable, refills given   Continue with the citalopram.   Plan: citalopram (CELEXA) 20 MG tablet            (G47.09) Other " "insomnia  Trial of hydroxyzine.   Discussed phentermine may also contribute. She will discuss with her weight loss management provider. Encouraged healthy habits / sleep hygiene habits. Avoid caffeine.   Plan: hydrOXYzine (ATARAX) 10 MG tablet          BMI 33.75  She has been working with weight loss management provider and doing well.     Tobacco use  Encouraged to continue to work on cessation efforts. She has side effects with chantix .          COUNSELING:  Reviewed preventive health counseling, as reflected in patient instructions       Regular exercise       Healthy diet/nutrition      BMI:   Estimated body mass index is 33.75 kg/m  as calculated from the following:    Height as of this encounter: 1.803 m (5' 11\").    Weight as of this encounter: 109.8 kg (242 lb).   Weight management plan: Patient referred to endocrine and/or weight management specialty      She reports that she has been smoking cigarettes. She has a 17.50 pack-year smoking history. She uses smokeless tobacco.  Nicotine/Tobacco Cessation Plan:   Information offered: Patient not interested at this time          Kristen M. Kehr, PA-C  [facility]  "

## 2023-12-14 ENCOUNTER — PATIENT OUTREACH (OUTPATIENT)
Dept: GASTROENTEROLOGY | Facility: CLINIC | Age: 53
End: 2023-12-14
Payer: COMMERCIAL

## 2023-12-18 ENCOUNTER — TELEPHONE (OUTPATIENT)
Dept: SURGERY | Facility: CLINIC | Age: 53
End: 2023-12-18
Payer: COMMERCIAL

## 2023-12-18 NOTE — TELEPHONE ENCOUNTER
Prior Authorization Retail Medication Request    Medication/Dose: Wegovy 2.4mg renewal  New/renewal/insurance change PA/secondary ins. PA: Renewal    Insurance   Key: [id number]    Pharmacy Information (if different than what is on RX)  Name:  [facility]  Phone:  [phone]  Fax:[phone]

## 2023-12-21 NOTE — TELEPHONE ENCOUNTER
PA Initiation    Medication: WEGOVY 2.4 MG/0.75ML SC SOAJ  Insurance Company: [employer] Non-Specialty PA's - Phone [phone] Fax [phone]  Pharmacy Filling the Rx: [id number] - [place] - [address] AT [facility]  Filling Pharmacy Phone: [phone]  Filling Pharmacy Fax: [phone]  Start Date: 12/21/2023

## 2023-12-22 NOTE — TELEPHONE ENCOUNTER
Prior Authorization Approval    Medication: WEGOVY 2.4 MG/0.75ML SC SOAJ  Authorization Effective Date: 12/21/2023  Authorization Expiration Date: 12/21/2024  Approved Dose/Quantity:   Reference #:     Insurance Company: [employer] Non-Specialty PA's - Phone [phone] Fax [phone]  Expected CoPay: $    CoPay Card Available:      Financial Assistance Needed:   Which Pharmacy is filling the prescription: [facility] #[id number] - [place], [place] - [facility] [address] AT [facility]  Pharmacy Notified: YES  Patient Notified: **Instructed pharmacy to notify patient when script is ready to /ship.**

## 2023-12-28 ENCOUNTER — MYC MEDICAL ADVICE (OUTPATIENT)
Dept: FAMILY MEDICINE | Facility: CLINIC | Age: 53
End: 2023-12-28
Payer: COMMERCIAL

## 2023-12-29 NOTE — TELEPHONE ENCOUNTER
Hello,     Please have the provider place a Referral Order in EPIC.  Based on the patient insurance product, we will review and work with the insurance company for this referral.    Thank you,     Kerry VELAZQUEZ Referrals

## 2024-01-02 ENCOUNTER — TELEPHONE (OUTPATIENT)
Dept: SURGERY | Facility: CLINIC | Age: 54
End: 2024-01-02
Payer: COMMERCIAL

## 2024-01-02 NOTE — TELEPHONE ENCOUNTER
Prior Authorization Retail Medication Request    Medication/Dose: Phentermine HCI 37.5mg Tablets  New/renewal/insurance change PA/secondary ins. PA:  RENEWAL  Rationale:  Previous approval  2023    Key: [id number]    Pharmacy Information (if different than what is on RX)  Name:  [facility]  Phone:  [phone]   Fax:  [phone]

## 2024-01-03 NOTE — TELEPHONE ENCOUNTER
PA Initiation    Medication: PHENTERMINE HCL 37.5 MG PO TABS  Insurance Company: [employer] - Phone [phone] Fax [phone]  Pharmacy Filling the Rx: [facility] #[id number] - [place] - [facility] AT [facility]  Filling Pharmacy Phone: [phone]  Filling Pharmacy Fax: [phone]  Start Date: 1/3/2024

## 2024-01-04 ENCOUNTER — OFFICE VISIT (OUTPATIENT)
Dept: SURGERY | Facility: CLINIC | Age: 54
End: 2024-01-04
Payer: COMMERCIAL

## 2024-01-04 VITALS
DIASTOLIC BLOOD PRESSURE: 62 MMHG | BODY MASS INDEX: 32.69 KG/M2 | SYSTOLIC BLOOD PRESSURE: 120 MMHG | HEIGHT: 71 IN | WEIGHT: 233.5 LBS

## 2024-01-04 DIAGNOSIS — E66.811 CLASS 1 OBESITY WITH BODY MASS INDEX (BMI) OF 32.0 TO 32.9 IN ADULT, UNSPECIFIED OBESITY TYPE, UNSPECIFIED WHETHER SERIOUS COMORBIDITY PRESENT: Primary | ICD-10-CM

## 2024-01-04 DIAGNOSIS — F17.200 TOBACCO USE DISORDER: ICD-10-CM

## 2024-01-04 PROCEDURE — 99214 OFFICE O/P EST MOD 30 MIN: CPT | Performed by: FAMILY MEDICINE

## 2024-01-04 NOTE — LETTER
1/4/2024         RE: Deepa Anderson  [address] [place] [address]        Dear Colleague,    Thank you for referring your patient, Deepa Anderson, to the [facility] [place]. Please see a copy of my visit note below.    Bariatric Follow-up    53 year old  female BMI:Body mass index is 32.57 kg/m .    Weight:   Wt Readings from Last 1 Encounters:   01/04/24 105.9 kg (233 lb 8 oz)    pounds      Comorbidities:  Patient Active Problem List   Diagnosis     History of dysplastic nevus     Acne vulgaris     CARDIOVASCULAR SCREENING; LDL GOAL LESS THAN 160     Tubular adenoma of colon     Tobacco use disorder     Body mass index (BMI) 33.0-33.9, adult     Hypertriglyceridemia     Gastroesophageal reflux disease, esophagitis presence not specified     Sedentary lifestyle     Menometrorrhagia     MELANI (generalized anxiety disorder)     Situational anxiety     Perimenopause     Heartburn     Morbid obesity with BMI of 40.0-44.9, adult (H)     Primary osteoarthritis of both hands     Low back pain     Knee pain     Impaired fasting glucose     BMI 33.0-33.9,adult     Other insomnia       Interim: Initial weight 305# BMI 42.61. Doing well on Semaglutide with 63# weight loss as of last visit. 72# Now. Labs 4/2023 marginal D (11 with normal PTH) and B-12 (386), on supplementation. A1c normal, Lipids OK. Drinking less and smoking less on Semaglutide. Now 7cigs/d. Sleep disruption with hormones. Hip pain. Walking with her  3-4 times a week 1.5 miles. No niasuse. Taking dulcolax 2X/wk and occ stool softener    Plan:  DIET  Continue 3 meals, protein first   EXERCISE consider strength training   PHARMACOTHERAPY Continue Wegovy., phentermine. Continue Vitamin D 2,000 and add B-12 or super b-complex    Discussed NWCR and continue keeping bowels moving. RD then me in 6 mo.Smoking cessation when ready. Has Chanrix at home. Discussed Vape pen with nicotine and  decreasing to no nicotine. Has previously been enrolled in [employer]uit Partner program.     -We reviewed her medications and whether associated with weight gain.  Current Outpatient Medications   Medication     citalopram (CELEXA) 20 MG tablet     cyanocobalamin (VITAMIN B-12) 1000 MCG sublingual tablet     hydrOXYzine (ATARAX) 10 MG tablet     loperamide (IMODIUM) 2 MG capsule     Multiple Vitamins-Minerals (WOMENS 50+ MULTI VITAMIN/MIN PO)     omeprazole (PRILOSEC) 20 MG DR capsule     phentermine (ADIPEX-P) 37.5 MG tablet     Semaglutide-Weight Management (WEGOVY) 2.4 MG/0.75ML pen     Vitamin D3 (CHOLECALCIFEROL) 25 mcg (1000 units) tablet     varenicline (CHANTIX CHENCHO) 0.5 MG X 11 & 1 MG X 42 tablet     varenicline (CHANTIX) 1 MG tablet     No current facility-administered medications for this visit.        We discussed [facility] Bariatric Basics including:  -eating 3 meals daily  -eating protein first  -eating slowly, chewing food well  -avoiding/limiting calorie containing beverages  -choosing wheat, not white with breads, crackers, pastas, regan, bagels, tortillas, rice  -limiting restaurant or cafeteria eating to twice a week or less  -We discussed the importance of restorative sleep and stress management in maintaining a healthy weight.  -We discussed insulin resistance and glycemic index as it relates to appetite and weight control  -We discussed the National Weight Control Registry healthy weight maintenance strategies and ways to optimize metabolism.  -We discussed the importance of physical activity including cardiovascular and strength training in maintaining a healthier weight and explored viable options.    Most recent labs:  Lab Results   Component Value Date    WBC 6.6 04/04/2023    HGB 14.6 04/04/2023    HCT 43.6 04/04/2023    MCV 91 04/04/2023     04/04/2023     Lab Results   Component Value Date    CHOL 213 (H) 09/27/2022     Lab Results   Component Value Date    HDL 60 09/27/2022       Lab  "Results   Component Value Date    TRIG 129 09/27/2022       Lab Results   Component Value Date    ALT 13 04/04/2023    AST 23 04/04/2023    ALKPHOS 99 04/04/2023       Lab Results   Component Value Date    TSH 1.44 04/04/2023     DIETARY HISTORY  Meals Per Day: 3  Eating Protein First?: yes  Food Diary: B:protein shake or legendary pastries L:salad at office, apple and cheese with meat  D:wild rice and baked carrot  Snacks Per Day:   Typical Snack: see above  Fluid Intake: water or SF neris  Portion Control: improved  Calorie Containing Beverages: no  Alcohol per week: none  Typical Protein Food Choices: see above  Choosing Whole Grains: yes  Grocery Shopping is done by: herself  Food Preparation is done by: herself  Meals at Restaurant/Cafeteria/Take Out Per Week: <2  Eating at the Table:   TV is Off During Meals:     Positive Changes Since Last Visit: exercising  Struggling With: sleep    Knowledgeable in Reading Food Labels: yes  Getting Adequate Protein: yes  Sleeping 7-8 hours/day interrupted and working on it  Stress management low    PHYSICAL ACTIVITY PATTERNS:  Cardiovascular: walking  Strength Training: not yet    REVIEW OF SYSTEMS  Going to  join [employer]  PHYSICAL EXAM:  Vitals: /62 (BP Location: Right arm, Patient Position: Sitting, Cuff Size: Adult Small)   Ht 1.803 m (5' 11\")   Wt 105.9 kg (233 lb 8 oz)   LMP 05/02/2019 (Approximate)   BMI 32.57 kg/m        GEN: Pleasant, well groomed, in no acute distress  EYES: EOMI,  ENT: airway patent  NECK: no carotid bruits, no anterior/supraclavicular lymphadenopathy, thyroid normal   HEART: Rhythm regular, rate regular, no murmur   LUNGS: Clear  ABDOMEN: not measured  VASCULAR: no  lower extremity edema  MUSCULOSKELETAL:  muscle mass OK  SKIN:  no color changes of venous stasis, no ulcerations    Total time spent on the date of this encounter doing: chart review, review of test results, patient visit, physical exam, education, counseling, " developing plan of care, and documenting = 31minutes.            Again, thank you for allowing me to participate in the care of your patient.        Sincerely,        Melisa José MD

## 2024-01-04 NOTE — PROGRESS NOTES
Bariatric Follow-up    53 year old  female BMI:Body mass index is 32.57 kg/m .    Weight:   Wt Readings from Last 1 Encounters:   01/04/24 105.9 kg (233 lb 8 oz)    pounds      Comorbidities:  Patient Active Problem List   Diagnosis    History of dysplastic nevus    Acne vulgaris    CARDIOVASCULAR SCREENING; LDL GOAL LESS THAN 160    Tubular adenoma of colon    Tobacco use disorder    Body mass index (BMI) 33.0-33.9, adult    Hypertriglyceridemia    Gastroesophageal reflux disease, esophagitis presence not specified    Sedentary lifestyle    Menometrorrhagia    MELANI (generalized anxiety disorder)    Situational anxiety    Perimenopause    Heartburn    Morbid obesity with BMI of 40.0-44.9, adult (H)    Primary osteoarthritis of both hands    Low back pain    Knee pain    Impaired fasting glucose    BMI 33.0-33.9,adult    Other insomnia       Interim: Initial weight 305# BMI 42.61. Doing well on Semaglutide with 63# weight loss as of last visit. 72# Now. Labs 4/2023 marginal D (11 with normal PTH) and B-12 (386), on supplementation. A1c normal, Lipids OK. Drinking less and smoking less on Semaglutide. Now 7cigs/d. Sleep disruption with hormones. Hip pain. Walking with her  3-4 times a week 1.5 miles. No niasuse. Taking dulcolax 2X/wk and occ stool softener    Plan:  DIET  Continue 3 meals, protein first   EXERCISE consider strength training   PHARMACOTHERAPY Continue Wegovy., phentermine. Continue Vitamin D 2,000 and add B-12 or super b-complex    Discussed NWCR and continue keeping bowels moving. RD then me in 6 mo.Smoking cessation when ready. Has Chanrix at home. Discussed Vape pen with nicotine and decreasing to no nicotine. Has previously been enrolled in [employer] program.     -We reviewed her medications and whether associated with weight gain.  Current Outpatient Medications   Medication    citalopram (CELEXA) 20 MG tablet    cyanocobalamin (VITAMIN B-12) 1000 MCG sublingual tablet     hydrOXYzine (ATARAX) 10 MG tablet    loperamide (IMODIUM) 2 MG capsule    Multiple Vitamins-Minerals (WOMENS 50+ MULTI VITAMIN/MIN PO)    omeprazole (PRILOSEC) 20 MG DR capsule    phentermine (ADIPEX-P) 37.5 MG tablet    Semaglutide-Weight Management (WEGOVY) 2.4 MG/0.75ML pen    Vitamin D3 (CHOLECALCIFEROL) 25 mcg (1000 units) tablet    varenicline (CHANTIX CHENCHO) 0.5 MG X 11 & 1 MG X 42 tablet    varenicline (CHANTIX) 1 MG tablet     No current facility-administered medications for this visit.        We discussed [facility] Bariatric Basics including:  -eating 3 meals daily  -eating protein first  -eating slowly, chewing food well  -avoiding/limiting calorie containing beverages  -choosing wheat, not white with breads, crackers, pastas, regan, bagels, tortillas, rice  -limiting restaurant or cafeteria eating to twice a week or less  -We discussed the importance of restorative sleep and stress management in maintaining a healthy weight.  -We discussed insulin resistance and glycemic index as it relates to appetite and weight control  -We discussed the National Weight Control Registry healthy weight maintenance strategies and ways to optimize metabolism.  -We discussed the importance of physical activity including cardiovascular and strength training in maintaining a healthier weight and explored viable options.    Most recent labs:  Lab Results   Component Value Date    WBC 6.6 04/04/2023    HGB 14.6 04/04/2023    HCT 43.6 04/04/2023    MCV 91 04/04/2023     04/04/2023     Lab Results   Component Value Date    CHOL 213 (H) 09/27/2022     Lab Results   Component Value Date    HDL 60 09/27/2022       Lab Results   Component Value Date    TRIG 129 09/27/2022       Lab Results   Component Value Date    ALT 13 04/04/2023    AST 23 04/04/2023    ALKPHOS 99 04/04/2023       Lab Results   Component Value Date    TSH 1.44 04/04/2023     DIETARY HISTORY  Meals Per Day: 3  Eating Protein First?: yes  Food Diary: B:protein  "shake or legendary pastries L:salad at office, apple and cheese with meat  D:wild rice and baked carrot  Snacks Per Day:   Typical Snack: see above  Fluid Intake: water or SF neris  Portion Control: improved  Calorie Containing Beverages: no  Alcohol per week: none  Typical Protein Food Choices: see above  Choosing Whole Grains: yes  Grocery Shopping is done by: herself  Food Preparation is done by: herself  Meals at Restaurant/Cafeteria/Take Out Per Week: <2  Eating at the Table:   TV is Off During Meals:     Positive Changes Since Last Visit: exercising  Struggling With: sleep    Knowledgeable in Reading Food Labels: yes  Getting Adequate Protein: yes  Sleeping 7-8 hours/day interrupted and working on it  Stress management low    PHYSICAL ACTIVITY PATTERNS:  Cardiovascular: walking  Strength Training: not yet    REVIEW OF SYSTEMS  Going to  join [employer]  PHYSICAL EXAM:  Vitals: /62 (BP Location: Right arm, Patient Position: Sitting, Cuff Size: Adult Small)   Ht 1.803 m (5' 11\")   Wt 105.9 kg (233 lb 8 oz)   LMP 05/02/2019 (Approximate)   BMI 32.57 kg/m        GEN: Pleasant, well groomed, in no acute distress  EYES: EOMI,  ENT: airway patent  NECK: no carotid bruits, no anterior/supraclavicular lymphadenopathy, thyroid normal   HEART: Rhythm regular, rate regular, no murmur   LUNGS: Clear  ABDOMEN: not measured  VASCULAR: no  lower extremity edema  MUSCULOSKELETAL:  muscle mass OK  SKIN:  no color changes of venous stasis, no ulcerations    Total time spent on the date of this encounter doing: chart review, review of test results, patient visit, physical exam, education, counseling, developing plan of care, and documenting = 31minutes.          "

## 2024-01-05 NOTE — TELEPHONE ENCOUNTER
PRIOR AUTHORIZATION DENIED    Medication: PHENTERMINE HCL 37.5 MG PO TABS  Insurance Company: [employer] - Phone [phone] Fax [phone]  Denial Date: 1/4/2024  Denial Reason(s):     Appeal Information:     Patient Notified: No, care team must notify on denials.

## 2024-03-26 DIAGNOSIS — E66.09 CLASS 2 OBESITY DUE TO EXCESS CALORIES WITHOUT SERIOUS COMORBIDITY WITH BODY MASS INDEX (BMI) OF 35.0 TO 35.9 IN ADULT: ICD-10-CM

## 2024-03-26 DIAGNOSIS — E66.812 CLASS 2 OBESITY DUE TO EXCESS CALORIES WITHOUT SERIOUS COMORBIDITY WITH BODY MASS INDEX (BMI) OF 35.0 TO 35.9 IN ADULT: ICD-10-CM

## 2024-03-26 RX ORDER — PHENTERMINE HYDROCHLORIDE 37.5 MG/1
18.75-37.5 TABLET ORAL
Qty: 90 TABLET | Refills: 1 | Status: SHIPPED | OUTPATIENT
Start: 2024-03-26

## 2024-03-26 NOTE — TELEPHONE ENCOUNTER
Pt requesting refill of Phentermine, just saw  in January and sees the dietitian in April.    Reina Fitzgerald RN, CBN  [facility]  P [phone]  F [phone]

## 2024-03-27 ENCOUNTER — TELEPHONE (OUTPATIENT)
Dept: SURGERY | Facility: CLINIC | Age: 54
End: 2024-03-27
Payer: COMMERCIAL

## 2024-03-27 NOTE — TELEPHONE ENCOUNTER
PRIOR AUTHORIZATION DENIED    Medication: PHENTERMINE HCL 37.5 MG PO TABS  Insurance Company: [employer] - Phone [phone] Fax [phone]  Denial Date: 3/27/2024  Denial Reason(s):     Appeal Information:     Patient Notified: No

## 2024-04-02 ENCOUNTER — VIRTUAL VISIT (OUTPATIENT)
Dept: SURGERY | Facility: CLINIC | Age: 54
End: 2024-04-02
Payer: COMMERCIAL

## 2024-04-02 DIAGNOSIS — Z71.3 NUTRITIONAL COUNSELING: ICD-10-CM

## 2024-04-02 DIAGNOSIS — E66.9 OBESITY (BMI 30-39.9): Primary | ICD-10-CM

## 2024-04-02 PROCEDURE — 97803 MED NUTRITION INDIV SUBSEQ: CPT | Mod: 93

## 2024-04-02 NOTE — LETTER
4/2/2024         RE: Deepa Anderson  [address] [place] [facility] [address]        Dear Colleague,    Thank you for referring your patient, Deepa Anderson, to the [facility] [place]. Please see a copy of my visit note below.    Deepa Anderson is a 53 year old who is being evaluated via a billable video visit.        How would you like to obtain your AVS? MyChart  If the video visit is dropped, the invitation should be resent by: Send to e-mail at: [email or web address]  Will anyone else be joining your video visit? No        Medical  Weight Loss Follow-Up Diet Evaluation  Assessment:  Deepa is presenting today for a follow up weight management nutrition consultation.  This patient has had an initial appointment and was referred by Dr. José for MNT as treatment for Obesity.     Weight loss medication: Phentermine. Lexi  Pt's weight is 217 lbs   Initial weight: 305 lbs   Weight change: 50 lbs (down)         1/3/2024     8:24 AM   Changes and Difficulties   I have made the following changes to my diet since my last visit: higher protein intake   With regards to my diet, I am still struggling with: eating 3 meals   I have made the following changes to my activity/exercise since my last visit: walking daily as weather permits   With regards to my activity/exercise, I am still struggling with: adding strength training     BMI: There is no height or weight on file to calculate BMI.  Ideal body weight: 70.8 kg (156 lb 1.4 oz)  Adjusted ideal body weight: 89.3 kg (196 lb 13.6 oz)    Estimated RMR (Cisco-St Jeor equation):   1872 kcals x 1.3 (light active) = 2434 kcals (for weight maintenance)     Recommended Protein Intake: 60-80 grams of protein/day  Patient Active Problem List:  Patient Active Problem List   Diagnosis     History of dysplastic nevus     Acne vulgaris     CARDIOVASCULAR SCREENING; LDL GOAL LESS THAN 160     Tubular adenoma of colon     Tobacco use  disorder     Body mass index (BMI) 33.0-33.9, adult     Hypertriglyceridemia     Gastroesophageal reflux disease, esophagitis presence not specified     Sedentary lifestyle     Menometrorrhagia     MELANI (generalized anxiety disorder)     Situational anxiety     Perimenopause     Heartburn     Morbid obesity with BMI of 40.0-44.9, adult (H)     Primary osteoarthritis of both hands     Low back pain     Knee pain     Impaired fasting glucose     BMI 33.0-33.9,adult     Other insomnia     Diabetes: No     Progress on goals from last visit: Patient reports she is feeling good regarding her energy levels and physical feeling. She is down an additional 16 lbs from her last visit in January. Choosing whole grains, lean proteins, fruits and veggies. We discussed total calorie and protein goals.  Taking B12: yes   Taking Vit D3: yes         Dietary Recall:  Breakfast: Protein Shake, Iced Coffee  Lunch: salad with protein (chicken, walnuts, cheese), apple   Dinner: pulled chicken, salad, greek yogurt with whipped cream and fruit   Typical snacks: apple with cheese bites   Beverages:   Water 60-80oz   Exercise: walking most days of the week 3.4-3.6 mph 3-4x per week     Nutrition Diagnosis:    Obesity (NC 3.3) related to overeating and poor lifestyle habits as evidenced by patient's subjective statements and BMI of 30.3 kg/m2.     Intervention:  Food and/or nutrient delivery: balanced meals, adequate protein   Nutrition education: none   Nutrition counseling: provided support and encouragement, continued goal setting       Monitoring/Evaluation:    Goals:  Continue to focus on protein first at each meal, aiming for 20-30 grams of protein/meal.   Continue walking routine pending weather at this time  Follow the 10/10 rule     Patient to follow up in 3 month(s) with bariatrician         Virtual Visit Details    Type of service:  Telephone Visit   Phone call duration: 15 minutes     Originating Location (pt. Location):  Home      Distant Location (provider location):  Off-site    Mode of Communication:  Video Conference via [facility]    Physician has received verbal consent for a Video Visit from the patient? Yes    Radha Martini RD            Again, thank you for allowing me to participate in the care of your patient.        Sincerely,        Radha Martini RD   Single

## 2024-04-02 NOTE — PATIENT INSTRUCTIONS
INITIAL WEIGHT LOSS CONSULT     My Plate    Food Label: The 10 - 10 Rule           Carbohydrates    Carbohydrates fuel your body with glucose (sugar)--the energy your body needs so you can do your daily activities.  Carbohydrates offer an immediate source of energy for your body. They provide the fuel for your muscles and organs, such as your brain.   Types of Carbohydrates     Complex Carbohydrates are higher in fiber and keep you feeling full longer--helping you eat less.   These are found in nearly all plant-based foods and usually take longer for the body to digest.  They are most commonly found in whole-wheat bread, whole-grain pasta, brown rice, starchy vegetables,   and fruits  Refined Carbohydrates require almost NO WORK for digestion and break down into glucose more quickly   than complex carbohydrates. Refined carbohydrates are usually high in calories and low in nutrients and fiber--  eating more of these can lead to weight gain.    Thinking about eliminating carbohydrates???  If you do not eat enough carbohydrates, the following can occur:  Fatigue  Muscle cramps  Poor mental function  Fatigue easily results from deprivation of carbohydrates, which is seen in people who fast, possibly interfering with activities of daily living.    If you do not eat enough carbohydrates, the following can occur:  Fatigue  Muscle cramps  Poor mental function    Fatigue easily results from deprivation of carbohydrates, which is seen in people who fast, possibly interfering with activities of daily living    Carbohydrates are your body's first choice for fuel. If given a choice of several types of foods simultaneously, your body will use the energy from carbohydrates first.    What foods contain carbohydrates?  Choose the following foods containing carbohydrates (the BEST ones to eat):   Fruit-fresh, frozen, canned in their own juices  Whole grains:  Whole-wheat breads  Brown rice  Oatmeal  Whole-grain cereals  Other  starchy foods containing a minimum of 3 grams (g) fiber/100 calories  The ingredient label should list whole wheat or whole grain as one of the first ingredients (bulgur, quinoa, buckwheat, millet, spelt, faro, kasha)  Milk or yogurt (a natural source of carbohydrates):  Low-fat milk  Fat-free milk  Yogurt   Beans or legumes   Starchy vegetables, raw or frozen:  Potatoes  Peas  Corn    AVOID or limit the following foods containing carbohydrates:  Refined sugars, such as in:  Candy  Desserts-ice cream, cakes, pies, brownies, frozen yogurt, sherbet/sorbet  Cookies  White flour: bread/pasta/crackers/rice/tortillas  Sugary snacks: sweetened cereal, granola bars, cereal bars, donuts, muffins, bagels  Sugary Drinks:  Fruit Juice, Smoothies  Sports Drinks  Regular Soda    What are typical serving sizes or portions?  The following are some serving and portion sizes for foods containing carbohydrates:  One medium piece of fruit, about 4?5 ounces (oz) (-tennis ball)  1 cup (C) berries or melon    C canned fruit    C juice (100% vegetable)    C starchy vegetables, cooked or chopped  One slice whole-grain bread  ? C brown rice, quinoa, buckwheat, millet, spelt, faro, kasha    C oatmeal (dry)    C bulgur  One small tortilla (less than 6inch diameter)    C wheat germ  1 oz pretzels     C flaked cereal

## 2024-04-02 NOTE — PROGRESS NOTES
Deepa Anderson is a 53 year old who is being evaluated via a billable video visit.        How would you like to obtain your AVS? MyChart  If the video visit is dropped, the invitation should be resent by: Send to e-mail at: [email or web address]  Will anyone else be joining your video visit? No        Medical  Weight Loss Follow-Up Diet Evaluation  Assessment:  Deepa is presenting today for a follow up weight management nutrition consultation.  This patient has had an initial appointment and was referred by Dr. José for MNT as treatment for Obesity.     Weight loss medication: Phentermine. Lexi  Pt's weight is 217 lbs   Initial weight: 305 lbs   Weight change: 50 lbs (down)         1/3/2024     8:24 AM   Changes and Difficulties   I have made the following changes to my diet since my last visit: higher protein intake   With regards to my diet, I am still struggling with: eating 3 meals   I have made the following changes to my activity/exercise since my last visit: walking daily as weather permits   With regards to my activity/exercise, I am still struggling with: adding strength training     BMI: There is no height or weight on file to calculate BMI.  Ideal body weight: 70.8 kg (156 lb 1.4 oz)  Adjusted ideal body weight: 89.3 kg (196 lb 13.6 oz)    Estimated RMR (Churchill-St Jeor equation):   1872 kcals x 1.3 (light active) = 2434 kcals (for weight maintenance)     Recommended Protein Intake: 60-80 grams of protein/day  Patient Active Problem List:  Patient Active Problem List   Diagnosis    History of dysplastic nevus    Acne vulgaris    CARDIOVASCULAR SCREENING; LDL GOAL LESS THAN 160    Tubular adenoma of colon    Tobacco use disorder    Body mass index (BMI) 33.0-33.9, adult    Hypertriglyceridemia    Gastroesophageal reflux disease, esophagitis presence not specified    Sedentary lifestyle    Menometrorrhagia    MELANI (generalized anxiety disorder)    Situational anxiety    Perimenopause    Heartburn     Morbid obesity with BMI of 40.0-44.9, adult (H)    Primary osteoarthritis of both hands    Low back pain    Knee pain    Impaired fasting glucose    BMI 33.0-33.9,adult    Other insomnia     Diabetes: No     Progress on goals from last visit: Patient reports she is feeling good regarding her energy levels and physical feeling. She is down an additional 16 lbs from her last visit in January. Choosing whole grains, lean proteins, fruits and veggies. We discussed total calorie and protein goals.  Taking B12: yes   Taking Vit D3: yes         Dietary Recall:  Breakfast: Protein Shake, Iced Coffee  Lunch: salad with protein (chicken, walnuts, cheese), apple   Dinner: pulled chicken, salad, greek yogurt with whipped cream and fruit   Typical snacks: apple with cheese bites   Beverages:   Water 60-80oz   Exercise: walking most days of the week 3.4-3.6 mph 3-4x per week     Nutrition Diagnosis:    Obesity (NC 3.3) related to overeating and poor lifestyle habits as evidenced by patient's subjective statements and BMI of 30.3 kg/m2.     Intervention:  Food and/or nutrient delivery: balanced meals, adequate protein   Nutrition education: none   Nutrition counseling: provided support and encouragement, continued goal setting       Monitoring/Evaluation:    Goals:  Continue to focus on protein first at each meal, aiming for 20-30 grams of protein/meal.   Continue walking routine pending weather at this time  Follow the 10/10 rule     Patient to follow up in 3 month(s) with bariatrician         Virtual Visit Details    Type of service:  Telephone Visit   Phone call duration: 15 minutes     Originating Location (pt. Location): Home      Distant Location (provider location):  Off-site    Mode of Communication:  Video Conference via [facility]    Physician has received verbal consent for a Video Visit from the patient? Yes    Radha Martini RD

## 2024-06-03 ENCOUNTER — MYC MEDICAL ADVICE (OUTPATIENT)
Dept: SURGERY | Facility: CLINIC | Age: 54
End: 2024-06-03
Payer: COMMERCIAL

## 2024-06-03 DIAGNOSIS — E66.813 CLASS 3 SEVERE OBESITY DUE TO EXCESS CALORIES WITHOUT SERIOUS COMORBIDITY WITH BODY MASS INDEX (BMI) OF 40.0 TO 44.9 IN ADULT (H): ICD-10-CM

## 2024-06-03 DIAGNOSIS — E66.01 CLASS 3 SEVERE OBESITY DUE TO EXCESS CALORIES WITHOUT SERIOUS COMORBIDITY WITH BODY MASS INDEX (BMI) OF 40.0 TO 44.9 IN ADULT (H): ICD-10-CM

## 2024-06-03 RX ORDER — SEMAGLUTIDE 2.4 MG/.75ML
2.4 INJECTION, SOLUTION SUBCUTANEOUS WEEKLY
Qty: 9 ML | Refills: 3 | Status: SHIPPED | OUTPATIENT
Start: 2024-06-03

## 2024-07-02 ENCOUNTER — OFFICE VISIT (OUTPATIENT)
Dept: SURGERY | Facility: CLINIC | Age: 54
End: 2024-07-02
Payer: COMMERCIAL

## 2024-07-02 VITALS
DIASTOLIC BLOOD PRESSURE: 70 MMHG | WEIGHT: 213.3 LBS | BODY MASS INDEX: 29.86 KG/M2 | SYSTOLIC BLOOD PRESSURE: 118 MMHG | HEIGHT: 71 IN

## 2024-07-02 DIAGNOSIS — E66.01 MORBID OBESITY WITH BMI OF 40.0-44.9, ADULT (H): Primary | ICD-10-CM

## 2024-07-02 DIAGNOSIS — F17.200 TOBACCO USE DISORDER: ICD-10-CM

## 2024-07-02 PROCEDURE — 99214 OFFICE O/P EST MOD 30 MIN: CPT | Performed by: FAMILY MEDICINE

## 2024-07-02 RX ORDER — CALCIUM POLYCARBOPHIL 625 MG
TABLET ORAL DAILY
COMMUNITY
End: 2024-07-02

## 2024-07-02 NOTE — PROGRESS NOTES
Bariatric Follow-up    54 year old  female BMI:Body mass index is 29.75 kg/m .    Weight:   Wt Readings from Last 1 Encounters:   07/02/24 96.8 kg (213 lb 4.8 oz)    pounds      Comorbidities:  Patient Active Problem List   Diagnosis    History of dysplastic nevus    Acne vulgaris    CARDIOVASCULAR SCREENING; LDL GOAL LESS THAN 160    Tubular adenoma of colon    Tobacco use disorder    Body mass index (BMI) 33.0-33.9, adult    Hypertriglyceridemia    Gastroesophageal reflux disease, esophagitis presence not specified    Sedentary lifestyle    Menometrorrhagia    MELANI (generalized anxiety disorder)    Situational anxiety    Perimenopause    Heartburn    Morbid obesity with BMI of 40.0-44.9, adult (H)    Primary osteoarthritis of both hands    Low back pain    Knee pain    Impaired fasting glucose    BMI 33.0-33.9,adult    Other insomnia         Interim: Maintaining a 92# weight loss. Planning on joining the gym.Walking now. Taking Wegovy 2.4mg. no nausea. Keeping bowels regular with metamucil. Still smoking. Has chantix and not ready as her  wasa diagnosed with cancer. On and off phentermine d/t  crashing in the middle of the day after coffee in the morning. Blood sugars, cholesterol, and blood pressure well controlled.     Plan:  DIET  Continue healthy foods with nice variety,    EXERCISE add strength training. Continue walking   PHARMACOTHERAPY continue Wegovy 2.4mg, phentermine, B-12    Continue B-12, follow up. Start Chantix and /or let us know when ready to quit.      -We reviewed her medications and whether associated with weight gain.  Current Outpatient Medications   Medication Sig Dispense Refill    citalopram (CELEXA) 20 MG tablet Take 1.5 tablets (30 mg) by mouth daily 135 tablet 3    cyanocobalamin (VITAMIN B-12) 1000 MCG sublingual tablet Place 1 tablet (1,000 mcg) under the tongue daily 90 tablet 3    hydrOXYzine (ATARAX) 10 MG tablet 1-2 tablets at night time for sleep as needed. 90 tablet  1    loperamide (IMODIUM) 2 MG capsule Take 2 mg by mouth PRN      Multiple Vitamins-Minerals (WOMENS 50+ MULTI VITAMIN/MIN PO)       phentermine (ADIPEX-P) 37.5 MG tablet Take 0.5-1 tablets (18.75-37.5 mg) by mouth every morning (before breakfast) (Patient taking differently: Take 18.75-37.5 mg by mouth every morning (before breakfast) Pt takes PRN) 90 tablet 1    psyllium (METAMUCIL/KONSYL) Packet Take 1 packet by mouth daily      Semaglutide-Weight Management (WEGOVY) 2.4 MG/0.75ML pen Inject 2.4 mg Subcutaneous once a week 9 mL 3    Vitamin D3 (CHOLECALCIFEROL) 25 mcg (1000 units) tablet Take by mouth daily      varenicline (CHANTIX CHENCHO) 0.5 MG X 11 & 1 MG X 42 tablet Take 0.5 mg tab daily for 3 days, THEN 0.5 mg tab twice daily for 4 days, THEN 1 mg twice daily. (Patient not taking: Reported on 1/4/2024) 53 tablet 0    varenicline (CHANTIX) 1 MG tablet Take 1 tablet (1 mg) by mouth 2 times daily (Patient not taking: Reported on 1/4/2024) 60 tablet 1     No current facility-administered medications for this visit.        We discussed [facility] Bariatric Basics including:  -eating 3 meals daily  -eating protein first  -eating slowly, chewing food well  -avoiding/limiting calorie containing beverages  -choosing wheat, not white with breads, crackers, pastas, regan, bagels, tortillas, rice  -limiting restaurant or cafeteria eating to twice a week or less  -We discussed the importance of restorative sleep and stress management in maintaining a healthy weight.  -We discussed insulin resistance and glycemic index as it relates to appetite and weight control  -We discussed the National Weight Control Registry healthy weight maintenance strategies and ways to optimize metabolism.  -We discussed the importance of physical activity including cardiovascular and strength training in maintaining a healthier weight and explored viable options.    Most recent labs:  Recent Labs   Lab Test 09/27/22  0953 01/31/18  0858   CHOL  "213* 194   HDL 60 58   * 104*   TRIG 129 160*      Hemoglobin A1C   Date Value Ref Range Status   04/04/2023 5.5 0.0 - 5.6 % Final     Comment:     Normal <5.7%   Prediabetes 5.7-6.4%    Diabetes 6.5% or higher     Note: Adopted from ADA consensus guidelines.      TSH   Date Value Ref Range Status   04/04/2023 1.44 0.30 - 4.20 uIU/mL Final   06/10/2019 2.10 0.40 - 4.00 mU/L Final      BP Readings from Last 3 Encounters:   07/02/24 118/70   01/04/24 120/62   10/30/23 127/82          DIETARY HISTORY  Meals Per Day: 3  Eating Protein First?: yes  Food Diary: B:protein shake L:tuna packet with WW crackers, cheese and apple D:broccoli salad with veggies and ranch  Snacks Per Day: yes  Typical Snack: fruit, veggies  Fluid Intake: intentional  Portion Control: improved  Calorie Containing Beverages: no  Alcohol per week: no beer, much less  Typical Protein Food Choices: lean  Choosing Whole Grains: yes  Grocery Shopping is done by: herself  Food Preparation is done by: herself  Meals at Restaurant/Cafeteria/Take Out Per Week: none  Eating at the Table:   TV is Off During Meals:     Positive Changes Since Last Visit: consistent healthy habits  Struggling With:     Knowledgeable in Reading Food Labels: ues  Getting Adequate Protein: yes  Sleeping 7-8 hours/day solid 7  Stress management low/mod    PHYSICAL ACTIVITY PATTERNS:  Cardiovascular: walking thinking about going to the gym  Strength Training: plans to go to the gym    REVIEW OF SYSTEMS   As above    PHYSICAL EXAM:  Vitals: /70 (BP Location: Right arm, Patient Position: Sitting, Cuff Size: Adult Small)   Ht 1.803 m (5' 11\")   Wt 96.8 kg (213 lb 4.8 oz)   LMP 05/02/2019 (Approximate)   BMI 29.75 kg/m        GEN: Pleasant, well groomed, in no acute distress  EYES: EOMI,  ENT: airway patent  NECK: no carotid bruits, no anterior/supraclavicular lymphadenopathy, thyroid normal   HEART: Rhythm regular, rate regular, no murmur   LUNGS: Clear  ABDOMEN: soft, " non-tender, obese, no rashes   VASCULAR: no  lower extremity edema  MUSCULOSKELETAL:  muscle mass OK  SKIN:  no color changes of venous stasis, no ulcerations    Total time spent on the date of this encounter doing: chart review, review of test results, patient visit, physical exam, education, counseling, developing plan of care, and documenting = 30 minutes.

## 2024-07-02 NOTE — LETTER
7/2/2024      Deepa Anderson  [address] [facility] [address]      Dear Colleague,    Thank you for referring your patient, Deepa Anderson, to the [facility] [place]. Please see a copy of my visit note below.    Bariatric Follow-up    54 year old  female BMI:Body mass index is 29.75 kg/m .    Weight:   Wt Readings from Last 1 Encounters:   07/02/24 96.8 kg (213 lb 4.8 oz)    pounds      Comorbidities:  Patient Active Problem List   Diagnosis     History of dysplastic nevus     Acne vulgaris     CARDIOVASCULAR SCREENING; LDL GOAL LESS THAN 160     Tubular adenoma of colon     Tobacco use disorder     Body mass index (BMI) 33.0-33.9, adult     Hypertriglyceridemia     Gastroesophageal reflux disease, esophagitis presence not specified     Sedentary lifestyle     Menometrorrhagia     MELANI (generalized anxiety disorder)     Situational anxiety     Perimenopause     Heartburn     Morbid obesity with BMI of 40.0-44.9, adult (H)     Primary osteoarthritis of both hands     Low back pain     Knee pain     Impaired fasting glucose     BMI 33.0-33.9,adult     Other insomnia         Interim: Maintaining a 92# weight loss. Planning on joining the gym.Walking now. Taking Wegovy 2.4mg. no nausea. Keeping bowels regular with metamucil. Still smoking. Has chantix and not ready as her  wasa diagnosed with cancer. On and off phentermine d/t  crashing in the middle of the day after coffee in the morning. Blood sugars, cholesterol, and blood pressure well controlled.     Plan:  DIET  Continue healthy foods with nice variety,    EXERCISE add strength training. Continue walking   PHARMACOTHERAPY continue Wegovy 2.4mg, phentermine, B-12    Continue B-12, follow up. Start Chantix and /or let us know when ready to quit.      -We reviewed her medications and whether associated with weight gain.  Current Outpatient Medications   Medication Sig Dispense Refill     citalopram  (CELEXA) 20 MG tablet Take 1.5 tablets (30 mg) by mouth daily 135 tablet 3     cyanocobalamin (VITAMIN B-12) 1000 MCG sublingual tablet Place 1 tablet (1,000 mcg) under the tongue daily 90 tablet 3     hydrOXYzine (ATARAX) 10 MG tablet 1-2 tablets at night time for sleep as needed. 90 tablet 1     loperamide (IMODIUM) 2 MG capsule Take 2 mg by mouth PRN       Multiple Vitamins-Minerals (WOMENS 50+ MULTI VITAMIN/MIN PO)        phentermine (ADIPEX-P) 37.5 MG tablet Take 0.5-1 tablets (18.75-37.5 mg) by mouth every morning (before breakfast) (Patient taking differently: Take 18.75-37.5 mg by mouth every morning (before breakfast) Pt takes PRN) 90 tablet 1     psyllium (METAMUCIL/KONSYL) Packet Take 1 packet by mouth daily       Semaglutide-Weight Management (WEGOVY) 2.4 MG/0.75ML pen Inject 2.4 mg Subcutaneous once a week 9 mL 3     Vitamin D3 (CHOLECALCIFEROL) 25 mcg (1000 units) tablet Take by mouth daily       varenicline (CHANTIX CHENCHO) 0.5 MG X 11 & 1 MG X 42 tablet Take 0.5 mg tab daily for 3 days, THEN 0.5 mg tab twice daily for 4 days, THEN 1 mg twice daily. (Patient not taking: Reported on 1/4/2024) 53 tablet 0     varenicline (CHANTIX) 1 MG tablet Take 1 tablet (1 mg) by mouth 2 times daily (Patient not taking: Reported on 1/4/2024) 60 tablet 1     No current facility-administered medications for this visit.        We discussed [facility] Bariatric Basics including:  -eating 3 meals daily  -eating protein first  -eating slowly, chewing food well  -avoiding/limiting calorie containing beverages  -choosing wheat, not white with breads, crackers, pastas, regan, bagels, tortillas, rice  -limiting restaurant or cafeteria eating to twice a week or less  -We discussed the importance of restorative sleep and stress management in maintaining a healthy weight.  -We discussed insulin resistance and glycemic index as it relates to appetite and weight control  -We discussed the National Weight Control Registry healthy weight  "maintenance strategies and ways to optimize metabolism.  -We discussed the importance of physical activity including cardiovascular and strength training in maintaining a healthier weight and explored viable options.    Most recent labs:  Recent Labs   Lab Test 09/27/22  0953 01/31/18  0858   CHOL 213* 194   HDL 60 58   * 104*   TRIG 129 160*      Hemoglobin A1C   Date Value Ref Range Status   04/04/2023 5.5 0.0 - 5.6 % Final     Comment:     Normal <5.7%   Prediabetes 5.7-6.4%    Diabetes 6.5% or higher     Note: Adopted from ADA consensus guidelines.      TSH   Date Value Ref Range Status   04/04/2023 1.44 0.30 - 4.20 uIU/mL Final   06/10/2019 2.10 0.40 - 4.00 mU/L Final      BP Readings from Last 3 Encounters:   07/02/24 118/70   01/04/24 120/62   10/30/23 127/82          DIETARY HISTORY  Meals Per Day: 3  Eating Protein First?: yes  Food Diary: B:protein shake L:tuna packet with WW crackers, cheese and apple D:broccoli salad with veggies and ranch  Snacks Per Day: yes  Typical Snack: fruit, veggies  Fluid Intake: intentional  Portion Control: improved  Calorie Containing Beverages: no  Alcohol per week: no beer, much less  Typical Protein Food Choices: lean  Choosing Whole Grains: yes  Grocery Shopping is done by: herself  Food Preparation is done by: herself  Meals at Restaurant/Cafeteria/Take Out Per Week: none  Eating at the Table:   TV is Off During Meals:     Positive Changes Since Last Visit: consistent healthy habits  Struggling With:     Knowledgeable in Reading Food Labels: ues  Getting Adequate Protein: yes  Sleeping 7-8 hours/day solid 7  Stress management low/mod    PHYSICAL ACTIVITY PATTERNS:  Cardiovascular: walking thinking about going to the gym  Strength Training: plans to go to the gym    REVIEW OF SYSTEMS   As above    PHYSICAL EXAM:  Vitals: /70 (BP Location: Right arm, Patient Position: Sitting, Cuff Size: Adult Small)   Ht 1.803 m (5' 11\")   Wt 96.8 kg (213 lb 4.8 oz)   LMP " 05/02/2019 (Approximate)   BMI 29.75 kg/m        GEN: Pleasant, well groomed, in no acute distress  EYES: EOMI,  ENT: airway patent  NECK: no carotid bruits, no anterior/supraclavicular lymphadenopathy, thyroid normal   HEART: Rhythm regular, rate regular, no murmur   LUNGS: Clear  ABDOMEN: soft, non-tender, obese, no rashes   VASCULAR: no  lower extremity edema  MUSCULOSKELETAL:  muscle mass OK  SKIN:  no color changes of venous stasis, no ulcerations    Total time spent on the date of this encounter doing: chart review, review of test results, patient visit, physical exam, education, counseling, developing plan of care, and documenting = 30 minutes.            Again, thank you for allowing me to participate in the care of your patient.        Sincerely,        Melisa José MD

## 2024-08-29 ENCOUNTER — TELEPHONE (OUTPATIENT)
Dept: SURGERY | Facility: CLINIC | Age: 54
End: 2024-08-29
Payer: COMMERCIAL

## 2024-08-29 NOTE — TELEPHONE ENCOUNTER
Prior Authorization Retail Medication Request    Medication/Dose: Phentermine HCI 37.5mg Tablets  New/renewal/insurance change PA/secondary ins. PA:  Previously Tried and Failed:  Pt has been taking this medication with good results.   Rationale:  Maintaining a 92# weight loss     Key: [id number]    Pharmacy Information (if different than what is on RX)  Name:  Barbara Mccurdy  Phone:  [phone]  Fax:  [phone]

## 2024-08-30 NOTE — TELEPHONE ENCOUNTER
Central Prior Authorization Team - Phone: [phone]     PA Initiation    Medication: PHENTERMINE HCL 37.5 MG PO TABS  Insurance Company: [employer] - Phone [phone] Fax [phone]  Pharmacy Filling the Rx: [id number] - [facility] [place], [place] - [address] AT [facility] [place]  Filling Pharmacy Phone: [phone]  Filling Pharmacy Fax:    Start Date: 8/30/2024

## 2024-09-05 NOTE — TELEPHONE ENCOUNTER
Central Prior Authorization Team - Phone: [phone]     PRIOR AUTHORIZATION DENIED    Medication: PHENTERMINE HCL 37.5 MG PO TABS  Insurance Company: [employer] - Phone [phone] Fax [phone]  Denial Date: 9/4/2024    Denial Reason(s):       Appeal Information:           Patient Notified: NO  Unfortunately, we cannot call the patient with denials because we do not know what next steps the MD will take nor can we give medical advice, please notify the patient of what they are to expect for the continuation of their therapy from the provider.

## 2024-10-01 ENCOUNTER — TRANSFERRED RECORDS (OUTPATIENT)
Dept: MULTI SPECIALTY CLINIC | Facility: CLINIC | Age: 54
End: 2024-10-01

## 2024-10-01 LAB — RETINOPATHY: NORMAL

## 2024-10-24 ENCOUNTER — ANCILLARY PROCEDURE (OUTPATIENT)
Dept: MAMMOGRAPHY | Facility: CLINIC | Age: 54
End: 2024-10-24
Attending: PHYSICIAN ASSISTANT
Payer: COMMERCIAL

## 2024-10-24 DIAGNOSIS — Z12.31 VISIT FOR SCREENING MAMMOGRAM: ICD-10-CM

## 2024-10-24 PROCEDURE — 77067 SCR MAMMO BI INCL CAD: CPT

## 2024-10-24 PROCEDURE — 77063 BREAST TOMOSYNTHESIS BI: CPT

## 2024-11-15 ENCOUNTER — PATIENT OUTREACH (OUTPATIENT)
Dept: CARE COORDINATION | Facility: CLINIC | Age: 54
End: 2024-11-15
Payer: COMMERCIAL

## 2024-11-18 ENCOUNTER — PATIENT OUTREACH (OUTPATIENT)
Dept: CARE COORDINATION | Facility: CLINIC | Age: 54
End: 2024-11-18
Payer: COMMERCIAL

## 2024-11-20 ENCOUNTER — OFFICE VISIT (OUTPATIENT)
Dept: PODIATRY | Facility: CLINIC | Age: 54
End: 2024-11-20
Attending: PHYSICIAN ASSISTANT
Payer: COMMERCIAL

## 2024-11-20 VITALS
BODY MASS INDEX: 29.82 KG/M2 | HEART RATE: 67 BPM | SYSTOLIC BLOOD PRESSURE: 110 MMHG | WEIGHT: 213 LBS | DIASTOLIC BLOOD PRESSURE: 77 MMHG | HEIGHT: 71 IN

## 2024-11-20 DIAGNOSIS — B35.1 PAIN DUE TO ONYCHOMYCOSIS OF TOENAILS OF BOTH FEET: Primary | ICD-10-CM

## 2024-11-20 DIAGNOSIS — B35.1 TOENAIL FUNGUS: ICD-10-CM

## 2024-11-20 DIAGNOSIS — M79.675 PAIN DUE TO ONYCHOMYCOSIS OF TOENAILS OF BOTH FEET: Primary | ICD-10-CM

## 2024-11-20 DIAGNOSIS — M79.674 PAIN DUE TO ONYCHOMYCOSIS OF TOENAILS OF BOTH FEET: Primary | ICD-10-CM

## 2024-11-20 PROCEDURE — 99203 OFFICE O/P NEW LOW 30 MIN: CPT | Performed by: PODIATRIST

## 2024-11-20 RX ORDER — CICLOPIROX 80 MG/ML
SOLUTION TOPICAL DAILY
Qty: 6.6 ML | Refills: 1 | Status: SHIPPED | OUTPATIENT
Start: 2024-11-20

## 2024-11-20 NOTE — PATIENT INSTRUCTIONS
NAIL FUNGUS / ONYCHOMYCOSIS   Nail fungus is not a hygiene problem and will likely not lead to significant medical problems. The nails may get thick causing pain and possibly local skin infection. Treatments include debridement (trimming), oral antifungals, topical antifungals and complete removal of the nail. Most fungal nails are not treated.     Topicals such as tea tree oil can be helpful for surface fungus and may, at best, limit progression. Over the counter creams (such as Lamisil) can also be used however, their effectiveness is also quite low.  Topical treatment with Pen lac is expensive and often not covered by insurance. Pen lac has an approximate 8% success rate. Topical therapy recommendations is to apply twice a day for at least 3-4 months as it takes 9 months for new nail to grow out.     Experts suggest soaking your feet for 15 to 20 minutes in a mixture of 1 cup vinegar to 4 cups warm water. Be sure to rinse well and pat your feet dry when you're done. You can soak your feet like this daily. But if your skin becomes irritated, try soaking only two to three times a week. Vicks VapoRub, as with vinegar, there have been no controlled clinical trials to assess the effectiveness of Vicks VapoRub on nail fungus, but there have been numerous anecdotal reports that it works. There's no consensus on how often to apply this product, so check with your doctor before using it on your nails.      Oral therapies include Sporanox and Lamisil. Oral therapies are also expensive and not very effective. Side effects such as liver disease are the main concern. Return of fungus is common even if the treatment worked.      Other Tips:  - Penlac nail medication apply daily x 4 months; remove old polish first day of each week  - Antifungal cream/powder (Zeasorb) - apply daily to feet and shoes x 2 months  - Clean shoes with Lysol or in washing machine every few weeks  - Rotate shoe gear; give them 24 hours to dry out  between days wearing them  - Clean pair of socks in morning, clean pair in afternoon if your feet sweat  - Shower shoes used in public showers/pools

## 2024-11-20 NOTE — Clinical Note
11/20/2024      Deepa Anderson  [address] [facility] [address]      Dear Colleague,    Thank you for referring your patient, Deepa Anderson, to the [facility] [place]. Please see a copy of my visit note below.    PATIENT HISTORY:  Deepa Anderson is a 54 year old female who presents to clinic with a chief complaint of painful, thick, discolored toenails on both feet.  The patient relates pain with wearing shoes.  The patient relates difficulty trimming them on his own safely.  The patient denies any surrounding redness, swelling or drainage.  Any previous notes and studies that pertain to the patient's condition were reviewed.    Pertinent medical, surgical and family history was reviewed in the Epic chart.    Past Medical History:   Past Medical History:   Diagnosis Date    Actinic keratosis 08/2010    porokeratosis R hand    Arthritis     Diarrhea 05/26/2015    H. pylori infection 02/04/2014    Impaired fasting glucose     Knee pain     Low back pain     chiropractice    Morbid obesity with BMI of 40.0-44.9, adult (H)     Primary osteoarthritis of both hands     Tear of triangular fibrocartilage of left wrist 12/06/2018       Medications:   Current Outpatient Medications:     citalopram (CELEXA) 20 MG tablet, Take 1.5 tablets (30 mg) by mouth daily, Disp: 135 tablet, Rfl: 3    hydrOXYzine (ATARAX) 10 MG tablet, 1-2 tablets at night time for sleep as needed., Disp: 90 tablet, Rfl: 1    loperamide (IMODIUM) 2 MG capsule, Take 2 mg by mouth PRN, Disp: , Rfl:     Multiple Vitamins-Minerals (WOMENS 50+ MULTI VITAMIN/MIN PO), , Disp: , Rfl:     phentermine (ADIPEX-P) 37.5 MG tablet, Take 0.5-1 tablets (18.75-37.5 mg) by mouth every morning (before breakfast) (Patient taking differently: Take 18.75-37.5 mg by mouth every morning (before breakfast) Pt takes PRN), Disp: 90 tablet, Rfl: 1    psyllium (METAMUCIL/KONSYL) Packet, Take 1 packet by mouth daily, Disp: , Rfl:      Semaglutide-Weight Management (WEGOVY) 2.4 MG/0.75ML pen, Inject 2.4 mg Subcutaneous once a week, Disp: 9 mL, Rfl: 3    varenicline (CHANTIX CHENCHO) 0.5 MG X 11 & 1 MG X 42 tablet, Take 0.5 mg tab daily for 3 days, THEN 0.5 mg tab twice daily for 4 days, THEN 1 mg twice daily. (Patient not taking: Reported on 1/4/2024), Disp: 53 tablet, Rfl: 0    varenicline (CHANTIX) 1 MG tablet, Take 1 tablet (1 mg) by mouth 2 times daily (Patient not taking: Reported on 1/4/2024), Disp: 60 tablet, Rfl: 1    Vitamin D3 (CHOLECALCIFEROL) 25 mcg (1000 units) tablet, Take by mouth daily, Disp: , Rfl:      Allergies:    Allergies   Allergen Reactions    Percodan [Aspirin]     Percolone [Oxycodone]      Per PT had no problems with this medication       Vitals: [facility] 05/02/2019 (Approximate)   BMI= There is no height or weight on file to calculate BMI.    LOWER EXTREMITY PHYSICAL EXAM    Dermatologic: Skin is intact to {RIGHT /LEFT:304912} lower extremity without significant lesions, rash or abrasion.   The nails appear hypertrophic, brittle and discolored times ten.  No surrounding erythema, edema or drainage noted bilaterally.     Vascular: DP & PT pulses are intact & regular on the {RIGHT /LEFT:588655}.   CFT and skin temperature is normal to the {RIGHT /LEFT:086116} lower extremity.     Neurologic: Lower extremity sensation is intact to light touch.  No evidence of weakness in the {RIGHT /LEFT:114295} lower extremity.        Musculoskeletal: Patient is ambulatory without assistive device or brace.  No gross ankle deformity noted.  No foot or ankle joint effusion is noted.  Noted ***      ASSESSMENT / PLAN:   No diagnosis found.    I have explained to Deepa about the conditions.  We discussed the underlying contributing factors to the condition as well as both conservative and surgical treatment options along with expected length of recovery.  At this time, the nails were sharply debrided times ten down to normal thickness and length.  The  patient was given information on local certified foot care nursing services that can provide routine nail care.      The patient was prescribed Penlac 8% topical antifungal solution to be applied to the affected toenails daily for up to six to nine months.      Nada verbalized agreement with and understanding of the rational for the diagnosis and treatment plan.  All questions were answered to best of my ability and the patient's satisfaction. The patient was advised to contact the clinic with any questions that may arise after the clinic visit.      Disclaimer: This note consists of symbols derived from keyboarding, dictation and/or voice recognition software. As a result, there may be errors in the script that have gone undetected. Please consider this when interpreting information found in this chart.       GREG Velasco.P.JOHN., F.A.C.F.A.S.        Again, thank you for allowing me to participate in the care of your patient.        Sincerely,        Ori Hernandez DPM

## 2024-11-20 NOTE — PROGRESS NOTES
PATIENT HISTORY:  Deepa Anderson is a 54 year old female who presents to clinic with a chief complaint of painful, thick, discolored toenails on both feet.  The patient relates pain with wearing shoes.  The patient relates difficulty trimming them on his own safely.  The patient denies any surrounding redness, swelling or drainage.  Any previous notes and studies that pertain to the patient's condition were reviewed.    Pertinent medical, surgical and family history was reviewed in the [facility] chart.    Past Medical History:   Past Medical History:   Diagnosis Date    Actinic keratosis 08/2010    porokeratosis R hand    Arthritis     Diarrhea 05/26/2015    H. pylori infection 02/04/2014    Impaired fasting glucose     Knee pain     Low back pain     chiropractice    Morbid obesity with BMI of 40.0-44.9, adult (H)     Primary osteoarthritis of both hands     Tear of triangular fibrocartilage of left wrist 12/06/2018       Medications:   Current Outpatient Medications:     citalopram (CELEXA) 20 MG tablet, Take 1.5 tablets (30 mg) by mouth daily, Disp: 135 tablet, Rfl: 3    hydrOXYzine (ATARAX) 10 MG tablet, 1-2 tablets at night time for sleep as needed., Disp: 90 tablet, Rfl: 1    loperamide (IMODIUM) 2 MG capsule, Take 2 mg by mouth PRN, Disp: , Rfl:     Multiple Vitamins-Minerals (WOMENS 50+ MULTI VITAMIN/MIN PO), , Disp: , Rfl:     phentermine (ADIPEX-P) 37.5 MG tablet, Take 0.5-1 tablets (18.75-37.5 mg) by mouth every morning (before breakfast) (Patient taking differently: Take 18.75-37.5 mg by mouth every morning (before breakfast) Pt takes PRN), Disp: 90 tablet, Rfl: 1    psyllium (METAMUCIL/KONSYL) Packet, Take 1 packet by mouth daily, Disp: , Rfl:     Semaglutide-Weight Management (WEGOVY) 2.4 MG/0.75ML pen, Inject 2.4 mg Subcutaneous once a week, Disp: 9 mL, Rfl: 3    varenicline (CHANTIX CHENCHO) 0.5 MG X 11 & 1 MG X 42 tablet, Take 0.5 mg tab daily for 3 days, THEN 0.5 mg tab twice daily for 4 days, THEN 1 mg  twice daily. (Patient not taking: Reported on 1/4/2024), Disp: 53 tablet, Rfl: 0    varenicline (CHANTIX) 1 MG tablet, Take 1 tablet (1 mg) by mouth 2 times daily (Patient not taking: Reported on 1/4/2024), Disp: 60 tablet, Rfl: 1    Vitamin D3 (CHOLECALCIFEROL) 25 mcg (1000 units) tablet, Take by mouth daily, Disp: , Rfl:      Allergies:    Allergies   Allergen Reactions    Percodan [Aspirin]     Percolone [Oxycodone]      Per PT had no problems with this medication       Vitals: LMP 05/02/2019 (Approximate)   BMI= There is no height or weight on file to calculate BMI.    LOWER EXTREMITY PHYSICAL EXAM    Dermatologic: Skin is intact to {RIGHT /LEFT:763595} lower extremity without significant lesions, rash or abrasion.   The nails appear hypertrophic, brittle and discolored times ten.  No surrounding erythema, edema or drainage noted bilaterally.     Vascular: DP & PT pulses are intact & regular on the {RIGHT /LEFT:325986}.   CFT and skin temperature is normal to the {RIGHT /LEFT:267773} lower extremity.     Neurologic: Lower extremity sensation is intact to light touch.  No evidence of weakness in the {RIGHT /LEFT:737720} lower extremity.        Musculoskeletal: Patient is ambulatory without assistive device or brace.  No gross ankle deformity noted.  No foot or ankle joint effusion is noted.  Noted ***      ASSESSMENT / PLAN:   No diagnosis found.    I have explained to Deepa about the conditions.  We discussed the underlying contributing factors to the condition as well as both conservative and surgical treatment options along with expected length of recovery.  At this time, the nails were sharply debrided times ten down to normal thickness and length.  The patient was given information on local certified foot care nursing services that can provide routine nail care.      The patient was prescribed Penlac 8% topical antifungal solution to be applied to the affected toenails daily for up to six to nine months.       Deepa verbalized agreement with and understanding of the rational for the diagnosis and treatment plan.  All questions were answered to best of my ability and the patient's satisfaction. The patient was advised to contact the clinic with any questions that may arise after the clinic visit.      Disclaimer: This note consists of symbols derived from keyboarding, dictation and/or voice recognition software. As a result, there may be errors in the script that have gone undetected. Please consider this when interpreting information found in this chart.       DAQUAN Hernandez D.P.M., F.EVELINA.C.F.A.S.

## 2024-11-20 NOTE — NURSING NOTE
"Chief Complaint   Patient presents with    Consult     BL toenail fungus       Initial /77   Pulse 67   Ht 1.803 m (5' 11\")   Wt 96.6 kg (213 lb)   LMP 05/02/2019 (Approximate)   BMI 29.71 kg/m   Estimated body mass index is 29.71 kg/m  as calculated from the following:    Height as of this encounter: 1.803 m (5' 11\").    Weight as of this encounter: 96.6 kg (213 lb).  Medications and allergies reviewed.      Ladonna GARZA MA    "

## 2024-12-17 ENCOUNTER — OFFICE VISIT (OUTPATIENT)
Dept: SURGERY | Facility: CLINIC | Age: 54
End: 2024-12-17
Payer: COMMERCIAL

## 2024-12-17 ENCOUNTER — MYC REFILL (OUTPATIENT)
Dept: FAMILY MEDICINE | Facility: CLINIC | Age: 54
End: 2024-12-17

## 2024-12-17 VITALS
WEIGHT: 211 LBS | BODY MASS INDEX: 29.54 KG/M2 | SYSTOLIC BLOOD PRESSURE: 112 MMHG | DIASTOLIC BLOOD PRESSURE: 72 MMHG | HEIGHT: 71 IN

## 2024-12-17 DIAGNOSIS — F41.8 SITUATIONAL ANXIETY: ICD-10-CM

## 2024-12-17 DIAGNOSIS — E66.01 MORBID OBESITY WITH BMI OF 40.0-44.9, ADULT (H): ICD-10-CM

## 2024-12-17 DIAGNOSIS — E66.3 OVERWEIGHT (BMI 25.0-29.9): Primary | ICD-10-CM

## 2024-12-17 DIAGNOSIS — Z86.39 HISTORY OF MORBID OBESITY: ICD-10-CM

## 2024-12-17 PROCEDURE — 99214 OFFICE O/P EST MOD 30 MIN: CPT | Performed by: FAMILY MEDICINE

## 2024-12-17 RX ORDER — CITALOPRAM HYDROBROMIDE 20 MG/1
30 TABLET ORAL DAILY
Qty: 135 TABLET | Refills: 0 | Status: SHIPPED | OUTPATIENT
Start: 2024-12-17

## 2024-12-17 NOTE — PROGRESS NOTES
Bariatric Follow-up    54 year old  female BMI:Body mass index is 29.43 kg/m .    Weight:   Wt Readings from Last 1 Encounters:   12/17/24 95.7 kg (211 lb)    pounds      Comorbidities:  Patient Active Problem List   Diagnosis    History of dysplastic nevus    Acne vulgaris    CARDIOVASCULAR SCREENING; LDL GOAL LESS THAN 160    Tubular adenoma of colon    Tobacco use disorder    Body mass index (BMI) 33.0-33.9, adult    Hypertriglyceridemia    Gastroesophageal reflux disease, esophagitis presence not specified    Sedentary lifestyle    Menometrorrhagia    MELANI (generalized anxiety disorder)    Situational anxiety    Perimenopause    Heartburn    Morbid obesity with BMI of 40.0-44.9, adult (H)    Primary osteoarthritis of both hands    Low back pain    Knee pain    Impaired fasting glucose    BMI 33.0-33.9,adult    Other insomnia         Interim: Maintaining a 94# weight loss. Tolerating Wegovy 2.4mg weekly. Partnering with her neighbor. Veronica matos and has hand weights now. Ready to quit smoking. Going to do the laser approach after the 1st of the year. Her daughter moved out. Son still a senior. Taking D and B-12 and probiotic    Plan:  DIET  Hydration, lean protein with each meal, continue with increased whole foods.    EXERCISE Stay intentional through winter months.    PHARMACOTHERAPY Continue Semaglutide 2.4mg weekly, B-12, and vitamin D    Discussed weight maintenance strategies and ways to optimize metabolism. Applaud tobacco cessation readiness for the new year. SANTOS prn and 6 month follow up     -We reviewed her medications and whether associated with weight gain.  Current Outpatient Medications   Medication Sig Dispense Refill    ciclopirox (PENLAC) 8 % external solution Apply topically daily. Apply to affected nails daily.  Remove the residual build up weekly with nail polish remover or an Kiln board. 6.6 mL 1    hydrOXYzine (ATARAX) 10 MG tablet 1-2 tablets at night time for sleep as needed. 90 tablet  1    Multiple Vitamins-Minerals (WOMENS 50+ MULTI VITAMIN/MIN PO)       phentermine (ADIPEX-P) 37.5 MG tablet Take 0.5-1 tablets (18.75-37.5 mg) by mouth every morning (before breakfast) (Patient taking differently: Take 18.75-37.5 mg by mouth every morning (before breakfast). Pt takes 1/2 of 37.5 mg in the morning and 1/2 of 37.5 mg in the afternoon.) 90 tablet 1    psyllium (METAMUCIL/KONSYL) Packet Take 1 packet by mouth daily      Semaglutide-Weight Management (WEGOVY) 2.4 MG/0.75ML pen Inject 2.4 mg Subcutaneous once a week 9 mL 3    Vitamin D3 (CHOLECALCIFEROL) 25 mcg (1000 units) tablet Take by mouth daily      citalopram (CELEXA) 20 MG tablet Take 1.5 tablets (30 mg) by mouth daily. 135 tablet 0     No current facility-administered medications for this visit.        We discussed [facility] Bariatric Basics including:  -eating 3 meals daily  -eating protein first  -eating slowly, chewing food well  -avoiding/limiting calorie containing beverages  -choosing wheat, not white with breads, crackers, pastas, regan, bagels, tortillas, rice  -limiting restaurant or cafeteria eating to twice a week or less  -We discussed the importance of restorative sleep and stress management in maintaining a healthy weight.  -We discussed insulin resistance and glycemic index as it relates to appetite and weight control  -We discussed the National Weight Control Registry healthy weight maintenance strategies and ways to optimize metabolism.  -We discussed the importance of physical activity including cardiovascular and strength training in maintaining a healthier weight and explored viable options.    Most recent labs:  Recent Labs   Lab Test 09/27/22  0953 01/31/18  0858   CHOL 213* 194   HDL 60 58   * 104*   TRIG 129 160*      Hemoglobin A1C   Date Value Ref Range Status   04/04/2023 5.5 0.0 - 5.6 % Final     Comment:     Normal <5.7%   Prediabetes 5.7-6.4%    Diabetes 6.5% or higher     Note: Adopted from ADA consensus  "guidelines.      TSH   Date Value Ref Range Status   04/04/2023 1.44 0.30 - 4.20 uIU/mL Final   06/10/2019 2.10 0.40 - 4.00 mU/L Final      BP Readings from Last 3 Encounters:   12/17/24 112/72   11/20/24 110/77   07/02/24 118/70          DIETARY HISTORY  Meals Per Day: yes  Eating Protein First?: yes  Food Diary: B:premier protein L:tuna pack and wheat crackers baby bell and apple D:veggies with spinach dip and pot roast healthy soup  Snacks Per Day: few  Typical Snack: HB eggs  Fluid Intake: intentional  Portion Control: implroved  Calorie Containing Beverages: no  Alcohol per week: no  Typical Protein Food Choices: lean  Choosing Whole Grains: yes  Grocery Shopping is done by: herself  Food Preparation is done by: herself  Meals at Restaurant/Cafeteria/Take Out Per Week: 0-1  Eating at the Table:   TV is Off During Meals:     Positive Changes Since Last Visit: multiple  Struggling With: walking in winter    Knowledgeable in Reading Food Labels: yes  Getting Adequate Protein: yes  Sleeping 7-8 hours/day perimenopausal- solid 6.5-7  Stress management low/high-work    PHYSICAL ACTIVITY PATTERNS:  Cardiovascular: walking and hula hoop  Strength Training: has weights    REVIEW OF SYSTEMS  PHYSICAL EXAM:  Vitals: /72   Ht 1.803 m (5' 11\")   Wt 95.7 kg (211 lb)   LMP 05/02/2019 (Approximate)   BMI 29.43 kg/m        GEN: Pleasant, well groomed, in no acute distress  EYES: EOMI,  ENT: airway patent  NECK: no carotid bruits, no anterior/supraclavicular lymphadenopathy, thyroid normal   HEART: Rhythm regular, rate regular, no murmur   LUNGS: Clear  ABDOMEN: soft, non-tender, obese, no rashes   VASCULAR: minimal  lower extremity edema  MUSCULOSKELETAL:  muscle mass OK  SKIN:  no color changes of venous stasis, no ulcerations    Total time spent on the date of this encounter doing: chart review, review of test results, patient visit, physical exam, education, counseling, developing plan of care, and documenting = " 30minutes.

## 2024-12-17 NOTE — PATIENT INSTRUCTIONS
"COMPREHENSIVE WEIGHT MANAGEMENT PROGRAM  VIRTUAL SUPPORT GROUPS    At [facility], our Comprehensive Weight Management program offers on-line support groups for patients who are working on weight loss and considering, preparing for, or have had weight loss surgery.     There is no cost for this opportunity.  You are invited to attend the?Virtual Support Groups?provided by any of the following locations:    [facility] via Microsoft Teams with Argenis Mata RD, RN  2.   [place] via Android App Review Source with Domingo Moya, PhD, LP  3.   [place] via Android App Review Source with Yun Gauthier RN  4.   [facility] via a Zoom Meeting with CHUN Wallace-    The following Support Group information can also be found on our website:  [email or web address]      [facility]   WEIGHT LOSS SURGERY SUPPORT GROUP  The support group is a patient-lead forum that meets monthly to share experiences, encouragement and education. It is open to those who have had weight loss surgery, are scheduled for surgery, or are considering surgery.   WHEN: 3rd Wednesday of each month from 5:00PM - 6:00PM using Microsoft Teams.   FACILITATOR: Led by Argenis Goldman RD, LD, RN, the program's Clinical Coordinator.   TO REGISTER: Please contact the clinic via [employer] [employer] or call the nurse line directly at [phone] to inform our staff that you would like an invite sent to you and to let us know the email you would like the invite sent to. Prior to the meeting, a link with directions on how to join the meeting will be sent to you.    2024 Meetings    September 18: Jeimy Waggoner, PhD, Outpatient Clinical Therapist, \"Pro Tips for Habit Change\".  October 16:  Let's Talk  This will be a time of group support.??   November 20:  Let's Talk  about How to Navigate the Upcoming Holiday Season.  December 18:  Let's Talk  and Celebrate the past year.       [facility] " "[facility] BARIATRIC CARE SUPPORT GROUP  This is open to all pre- and post- operative bariatric surgery patients as well as their support system.   WHEN: 3rd Tuesday of each month from 6:30 PM - 8:00 PM using Microsoft Teams.   FACILITATOR: Led by Doimngo Moya, Ph.D who is a Licensed Psychologist with the [facility] Comprehensive Weight Management Program.   TO REGISTER: Please send an email to Domingo Moya, Ph.D.,  at?[email or web address]?if you would like an invitation to the group. Prior to the meeting, a link with directions on how to join the meeting will be sent to you.    2024 Meetings September 17: IN PERSON MEETING. [facility], [address] [place] [address], [id number], 1st floor Conference Room.  October 15: Date changed to OCTOBER 8th (2nd Tuesday).   November 19: \"Holiday Eating\", Annette Phipps RD, LD.  December 17: \"Hospital Stay and Compliance\", Yun Gauthier RN, CBN.      [facility] POST-OPERATIVE BARIATRIC SURGERY SUPPORT GROUP  This is a support group for [facility] bariatric patients (and those external to [facility]) who have had bariatric surgery and are at least 3 months post-surgery.  WHEN: 4th Thursday of the month from 11:00 AM - 12:00 PM using Microsoft Teams.   FACILITATOR: Led by Certified Bariatric Nurse, Yun Gauthier RN, CBN.   TO REGISTER: Please send an email to Yun at [email or web address] if you would like an invitation to the group.  Prior to the meeting, a link with directions on how to join the meeting will be sent to you.    2024 Meetings September 26 October 24 November 28: No meeting  December 26    [facility]   HEALTHY LIFESTYLE COACHING GROUP  This is a 60 minute virtual coaching group for those who want to lead a healthier lifestyle. Come together to set goals and " overcome barriers in a supportive group environment. We will address the four pillars of health: nutrition, exercise, sleep, and emotional well-being. This group is highly recommended for those who are participating in the 24 week Healthy Lifestyle Plan and our Health Coaching sessions.   WHEN: 1st Friday of the month, 12:30 PM - 1:30 PM   using a Zoom meeting.     FACILITATOR: Led by National Board-Certified Health and , EDA Wallace.  TO REGISTER: Please call the [facility]PS at [phone] to register. You will get an appointment to attend in [place]. Fifteen minutes prior to the meeting, complete the e-check in and you will get the link to join the meeting.  There is no charge to attend this group and space is limited.  Please register for each month you wish to attend    We offer support groups for patients who are working on weight loss and considering, preparing for or have had weight loss surgery.   There is no cost for this opportunity.  You are invited to attend the?Virtual Support Groups?provided by any of the following locations:    [facility] via [employer] Teams with Argenis Goldman RN  2.   [place] via [place] Group with Domingo Moya, PhD, LP  3.   [place] via [place] Group with Yun Gauthier RN  4.   [facility] via TotalTakeout Teams with EDA Wallace    The following Support Group information can also be found on our website:  [email or web address]      [facility] Weight Loss Surgery Support Group    [facility] Weight Loss Surgery Support Group  The support group is a patient-lead forum that meets monthly to share experiences, encouragement and education. It is open to those who have had weight loss surgery, are scheduled for surgery, and those who are considering surgery.   WHEN: This group meets on the 3rd Wednesday of each month from 5:00PM - 6:00PM virtually  using Microsoft Teams.   FACILITATOR: Led by Argenis Goldman, the program's Clinical Coordinator.   TO REGISTER: Please contact the clinic via [place] or call the nurse line directly at [phone] to inform our staff that you would like an invite sent to you and to let us know the email you would like the invite sent to. Prior to the meeting, a link with directions on how to join the meeting will be sent to you.    [facility] Support Groups    [facility]: Bariatric Care Support Group?  This is open to all [facility] (and those external to this program) pre- and post- operative bariatric surgery patients as well as their support system.   WHEN: This group meets the 3rd Tuesday of each month from 6:30 PM - 8:00 PM virtually using Microsoft Teams.   FACILITATOR: Led by Domingo Moya, Ph.D who is a Licensed Psychologist with the [facility] Comprehensive Weight Management Program.   TO REGISTER: Please send an email to Domingo Moya, Ph.D., LP at [email or web address]?if you would like an invitation to the group and to learn about using Microsoft Teams.      [facility]: Post-Operative Bariatric Surgery Support Group  This is a support group for [facility] bariatric patients (and those external to [facility]) who have had bariatric surgery and are at least 3 months post-surgery.  WHEN: This support group meets the 4th Thursday of the month from 11:00 AM - 12:00 PM virtually using Microsoft Teams.   FACILITATOR: Led by Certified Bariatric Nurse, Yun Gauthier RN.   TO REGISTER: Please send an email to Yun at [email or web address] if you would like an invitation to the group and to learn about using Virtual Web.      [facility] Healthy Lifestyle Virtual Support Group    Healthy Lifestyle Virtual Support Group?  This is 60 minutes of small group guided discussion, support and resources. All are  welcome who want a healthy lifestyle.  WHEN: This group meets monthly on a Friday from 12:30 PM - to 1:30 PM virtually using Microsoft Teams.   FACILITATOR: Led by National Board Certified Health , Yun Jean Baptiste On license of [facility].   TO REGISTER: Please send an email to Yun [email or web address] to receive monthly invites to the group or if you have any questions about having a health .  Prior to the meeting, a link with directions on how to join the meeting will be sent to you.

## 2024-12-17 NOTE — LETTER
12/17/2024      Deepa Anderson  [address] [facility] [address]      Dear Colleague,    Thank you for referring your patient, Deepa Anderson, to the [facility] [place]. Please see a copy of my visit note below.    Bariatric Follow-up    54 year old  female BMI:Body mass index is 29.43 kg/m .    Weight:   Wt Readings from Last 1 Encounters:   12/17/24 95.7 kg (211 lb)    pounds      Comorbidities:  Patient Active Problem List   Diagnosis     History of dysplastic nevus     Acne vulgaris     CARDIOVASCULAR SCREENING; LDL GOAL LESS THAN 160     Tubular adenoma of colon     Tobacco use disorder     Body mass index (BMI) 33.0-33.9, adult     Hypertriglyceridemia     Gastroesophageal reflux disease, esophagitis presence not specified     Sedentary lifestyle     Menometrorrhagia     MELANI (generalized anxiety disorder)     Situational anxiety     Perimenopause     Heartburn     Morbid obesity with BMI of 40.0-44.9, adult (H)     Primary osteoarthritis of both hands     Low back pain     Knee pain     Impaired fasting glucose     BMI 33.0-33.9,adult     Other insomnia         Interim: Maintaining a 94# weight loss. Tolerating Wegovy 2.4mg weekly. Partnering with her neighbor. Veronica matos and has hand weights now. Ready to quit smoking. Going to do the laser approach after the 1st of the year. Her daughter moved out. Son still a senior. Taking D and B-12 and probiotic    Plan:  DIET  Hydration, lean protein with each meal, continue with increased whole foods.    EXERCISE Stay intentional through winter months.    PHARMACOTHERAPY Continue Semaglutide 2.4mg weekly, B-12, and vitamin D    Discussed weight maintenance strategies and ways to optimize metabolism. Applaud tobacco cessation readiness for the new year. RD prn and 6 month follow up     -We reviewed her medications and whether associated with weight gain.  Current Outpatient Medications   Medication Sig  Dispense Refill     ciclopirox (PENLAC) 8 % external solution Apply topically daily. Apply to affected nails daily.  Remove the residual build up weekly with nail polish remover or an Watkins board. 6.6 mL 1     hydrOXYzine (ATARAX) 10 MG tablet 1-2 tablets at night time for sleep as needed. 90 tablet 1     Multiple Vitamins-Minerals (WOMENS 50+ MULTI VITAMIN/MIN PO)        phentermine (ADIPEX-P) 37.5 MG tablet Take 0.5-1 tablets (18.75-37.5 mg) by mouth every morning (before breakfast) (Patient taking differently: Take 18.75-37.5 mg by mouth every morning (before breakfast). Pt takes 1/2 of 37.5 mg in the morning and 1/2 of 37.5 mg in the afternoon.) 90 tablet 1     psyllium (METAMUCIL/KONSYL) Packet Take 1 packet by mouth daily       Semaglutide-Weight Management (WEGOVY) 2.4 MG/0.75ML pen Inject 2.4 mg Subcutaneous once a week 9 mL 3     Vitamin D3 (CHOLECALCIFEROL) 25 mcg (1000 units) tablet Take by mouth daily       citalopram (CELEXA) 20 MG tablet Take 1.5 tablets (30 mg) by mouth daily. 135 tablet 0     No current facility-administered medications for this visit.        We discussed [facility] Bariatric Basics including:  -eating 3 meals daily  -eating protein first  -eating slowly, chewing food well  -avoiding/limiting calorie containing beverages  -choosing wheat, not white with breads, crackers, pastas, regan, bagels, tortillas, rice  -limiting restaurant or cafeteria eating to twice a week or less  -We discussed the importance of restorative sleep and stress management in maintaining a healthy weight.  -We discussed insulin resistance and glycemic index as it relates to appetite and weight control  -We discussed the National Weight Control Registry healthy weight maintenance strategies and ways to optimize metabolism.  -We discussed the importance of physical activity including cardiovascular and strength training in maintaining a healthier weight and explored viable options.    Most recent labs:  Recent  "Labs   Lab Test 09/27/22  0953 01/31/18  0858   CHOL 213* 194   HDL 60 58   * 104*   TRIG 129 160*      Hemoglobin A1C   Date Value Ref Range Status   04/04/2023 5.5 0.0 - 5.6 % Final     Comment:     Normal <5.7%   Prediabetes 5.7-6.4%    Diabetes 6.5% or higher     Note: Adopted from ADA consensus guidelines.      TSH   Date Value Ref Range Status   04/04/2023 1.44 0.30 - 4.20 uIU/mL Final   06/10/2019 2.10 0.40 - 4.00 mU/L Final      BP Readings from Last 3 Encounters:   12/17/24 112/72   11/20/24 110/77   07/02/24 118/70          DIETARY HISTORY  Meals Per Day: yes  Eating Protein First?: yes  Food Diary: B:premier protein L:tuna pack and wheat crackers baby bell and apple D:veggies with spinach dip and pot roast healthy soup  Snacks Per Day: few  Typical Snack: HB eggs  Fluid Intake: intentional  Portion Control: implroved  Calorie Containing Beverages: no  Alcohol per week: no  Typical Protein Food Choices: lean  Choosing Whole Grains: yes  Grocery Shopping is done by: herself  Food Preparation is done by: herself  Meals at Restaurant/Cafeteria/Take Out Per Week: 0-1  Eating at the Table:   TV is Off During Meals:     Positive Changes Since Last Visit: multiple  Struggling With: walking in winter    Knowledgeable in Reading Food Labels: yes  Getting Adequate Protein: yes  Sleeping 7-8 hours/day perimenopausal- solid 6.5-7  Stress management low/high-work    PHYSICAL ACTIVITY PATTERNS:  Cardiovascular: walking and hula hoop  Strength Training: has weights    REVIEW OF SYSTEMS  PHYSICAL EXAM:  Vitals: /72   Ht 1.803 m (5' 11\")   Wt 95.7 kg (211 lb)   LMP 05/02/2019 (Approximate)   BMI 29.43 kg/m        GEN: Pleasant, well groomed, in no acute distress  EYES: EOMI,  ENT: airway patent  NECK: no carotid bruits, no anterior/supraclavicular lymphadenopathy, thyroid normal   HEART: Rhythm regular, rate regular, no murmur   LUNGS: Clear  ABDOMEN: soft, non-tender, obese, no rashes   VASCULAR: minimal  " lower extremity edema  MUSCULOSKELETAL:  muscle mass OK  SKIN:  no color changes of venous stasis, no ulcerations    Total time spent on the date of this encounter doing: chart review, review of test results, patient visit, physical exam, education, counseling, developing plan of care, and documenting = 30minutes.            Again, thank you for allowing me to participate in the care of your patient.        Sincerely,        Melisa José MD

## 2025-01-13 SDOH — HEALTH STABILITY: PHYSICAL HEALTH: ON AVERAGE, HOW MANY DAYS PER WEEK DO YOU ENGAGE IN MODERATE TO STRENUOUS EXERCISE (LIKE A BRISK WALK)?: 3 DAYS

## 2025-01-13 SDOH — HEALTH STABILITY: PHYSICAL HEALTH: ON AVERAGE, HOW MANY MINUTES DO YOU ENGAGE IN EXERCISE AT THIS LEVEL?: 40 MIN

## 2025-01-13 ASSESSMENT — SOCIAL DETERMINANTS OF HEALTH (SDOH): HOW OFTEN DO YOU GET TOGETHER WITH FRIENDS OR RELATIVES?: THREE TIMES A WEEK

## 2025-01-14 ENCOUNTER — MYC REFILL (OUTPATIENT)
Dept: SURGERY | Facility: CLINIC | Age: 55
End: 2025-01-14
Payer: COMMERCIAL

## 2025-01-14 DIAGNOSIS — E66.812 CLASS 2 OBESITY DUE TO EXCESS CALORIES WITHOUT SERIOUS COMORBIDITY WITH BODY MASS INDEX (BMI) OF 35.0 TO 35.9 IN ADULT: ICD-10-CM

## 2025-01-14 DIAGNOSIS — E66.09 CLASS 2 OBESITY DUE TO EXCESS CALORIES WITHOUT SERIOUS COMORBIDITY WITH BODY MASS INDEX (BMI) OF 35.0 TO 35.9 IN ADULT: ICD-10-CM

## 2025-01-14 RX ORDER — PHENTERMINE HYDROCHLORIDE 37.5 MG/1
18.75-37.5 TABLET ORAL
Qty: 90 TABLET | Refills: 1 | Status: SHIPPED | OUTPATIENT
Start: 2025-01-14

## 2025-01-16 ENCOUNTER — OFFICE VISIT (OUTPATIENT)
Dept: FAMILY MEDICINE | Facility: CLINIC | Age: 55
End: 2025-01-16
Payer: COMMERCIAL

## 2025-01-16 VITALS
DIASTOLIC BLOOD PRESSURE: 74 MMHG | OXYGEN SATURATION: 100 % | HEART RATE: 87 BPM | SYSTOLIC BLOOD PRESSURE: 117 MMHG | HEIGHT: 71 IN | RESPIRATION RATE: 16 BRPM | BODY MASS INDEX: 29.82 KG/M2 | WEIGHT: 213 LBS | TEMPERATURE: 97.4 F

## 2025-01-16 DIAGNOSIS — Z00.00 ROUTINE GENERAL MEDICAL EXAMINATION AT A HEALTH CARE FACILITY: Primary | ICD-10-CM

## 2025-01-16 DIAGNOSIS — E78.1 HYPERTRIGLYCERIDEMIA: ICD-10-CM

## 2025-01-16 DIAGNOSIS — Z11.59 NEED FOR HEPATITIS C SCREENING TEST: ICD-10-CM

## 2025-01-16 DIAGNOSIS — R12 HEART BURN: ICD-10-CM

## 2025-01-16 DIAGNOSIS — R25.2 CRAMP OF LIMB: ICD-10-CM

## 2025-01-16 DIAGNOSIS — F41.8 SITUATIONAL ANXIETY: ICD-10-CM

## 2025-01-16 LAB
ALBUMIN SERPL BCG-MCNC: 4 G/DL (ref 3.5–5.2)
ALP SERPL-CCNC: 66 U/L (ref 40–150)
ALT SERPL W P-5'-P-CCNC: 17 U/L (ref 0–50)
ANION GAP SERPL CALCULATED.3IONS-SCNC: 9 MMOL/L (ref 7–15)
AST SERPL W P-5'-P-CCNC: 34 U/L (ref 0–45)
BASOPHILS # BLD AUTO: 0 10E3/UL (ref 0–0.2)
BASOPHILS NFR BLD AUTO: 0 %
BILIRUB SERPL-MCNC: 0.4 MG/DL
BUN SERPL-MCNC: 13.4 MG/DL (ref 6–20)
CALCIUM SERPL-MCNC: 9.2 MG/DL (ref 8.8–10.4)
CHLORIDE SERPL-SCNC: 104 MMOL/L (ref 98–107)
CHOLEST SERPL-MCNC: 200 MG/DL
CREAT SERPL-MCNC: 0.78 MG/DL (ref 0.51–0.95)
EGFRCR SERPLBLD CKD-EPI 2021: >90 ML/MIN/1.73M2
EOSINOPHIL # BLD AUTO: 0.2 10E3/UL (ref 0–0.7)
EOSINOPHIL NFR BLD AUTO: 3 %
ERYTHROCYTE [DISTWIDTH] IN BLOOD BY AUTOMATED COUNT: 12.7 % (ref 10–15)
FASTING STATUS PATIENT QL REPORTED: YES
FASTING STATUS PATIENT QL REPORTED: YES
GLUCOSE SERPL-MCNC: 94 MG/DL (ref 70–99)
HCO3 SERPL-SCNC: 26 MMOL/L (ref 22–29)
HCT VFR BLD AUTO: 40.4 % (ref 35–47)
HCV AB SERPL QL IA: NONREACTIVE
HDLC SERPL-MCNC: 69 MG/DL
HGB BLD-MCNC: 13.3 G/DL (ref 11.7–15.7)
IMM GRANULOCYTES # BLD: 0 10E3/UL
IMM GRANULOCYTES NFR BLD: 0 %
LDLC SERPL CALC-MCNC: 119 MG/DL
LYMPHOCYTES # BLD AUTO: 1.7 10E3/UL (ref 0.8–5.3)
LYMPHOCYTES NFR BLD AUTO: 27 %
MAGNESIUM SERPL-MCNC: 2.1 MG/DL (ref 1.7–2.3)
MCH RBC QN AUTO: 30.2 PG (ref 26.5–33)
MCHC RBC AUTO-ENTMCNC: 32.9 G/DL (ref 31.5–36.5)
MCV RBC AUTO: 92 FL (ref 78–100)
MONOCYTES # BLD AUTO: 0.4 10E3/UL (ref 0–1.3)
MONOCYTES NFR BLD AUTO: 7 %
NEUTROPHILS # BLD AUTO: 4 10E3/UL (ref 1.6–8.3)
NEUTROPHILS NFR BLD AUTO: 63 %
NONHDLC SERPL-MCNC: 131 MG/DL
PLATELET # BLD AUTO: 240 10E3/UL (ref 150–450)
POTASSIUM SERPL-SCNC: 4.6 MMOL/L (ref 3.4–5.3)
PROT SERPL-MCNC: 6.6 G/DL (ref 6.4–8.3)
RBC # BLD AUTO: 4.4 10E6/UL (ref 3.8–5.2)
SODIUM SERPL-SCNC: 139 MMOL/L (ref 135–145)
TRIGL SERPL-MCNC: 62 MG/DL
WBC # BLD AUTO: 6.3 10E3/UL (ref 4–11)

## 2025-01-16 RX ORDER — CITALOPRAM HYDROBROMIDE 20 MG/1
30 TABLET ORAL DAILY
Qty: 135 TABLET | Refills: 0 | Status: CANCELLED | OUTPATIENT
Start: 2025-01-16

## 2025-01-16 RX ORDER — CITALOPRAM HYDROBROMIDE 40 MG/1
40 TABLET ORAL DAILY
Qty: 90 TABLET | Refills: 3 | Status: SHIPPED | OUTPATIENT
Start: 2025-01-16

## 2025-01-16 ASSESSMENT — PAIN SCALES - GENERAL: PAINLEVEL_OUTOF10: NO PAIN (0)

## 2025-01-16 NOTE — PATIENT INSTRUCTIONS
Patient Education   Preventive Care Advice   This is general advice given by our system to help you stay healthy. However, your care team may have specific advice just for you. Please talk to your care team about your preventive care needs.  Nutrition  Eat 5 or more servings of fruits and vegetables each day.  Try wheat bread, brown rice and whole grain pasta (instead of white bread, rice, and pasta).  Get enough calcium and vitamin D. Check the label on foods and aim for 100% of the RDA (recommended daily allowance).  Lifestyle  Exercise at least 150 minutes each week  (30 minutes a day, 5 days a week).  Do muscle strengthening activities 2 days a week. These help control your weight and prevent disease.  No smoking.  Wear sunscreen to prevent skin cancer.  Have a dental exam and cleaning every 6 months.  Yearly exams  See your health care team every year to talk about:  Any changes in your health.  Any medicines your care team has prescribed.  Preventive care, family planning, and ways to prevent chronic diseases.  Shots (vaccines)   HPV shots (up to age 26), if you've never had them before.  Hepatitis B shots (up to age 59), if you've never had them before.  COVID-19 shot: Get this shot when it's due.  Flu shot: Get a flu shot every year.  Tetanus shot: Get a tetanus shot every 10 years.  Pneumococcal, hepatitis A, and RSV shots: Ask your care team if you need these based on your risk.  Shingles shot (for age 50 and up)  General health tests  Diabetes screening:  Starting at age 35, Get screened for diabetes at least every 3 years.  If you are younger than age 35, ask your care team if you should be screened for diabetes.  Cholesterol test: At age 39, start having a cholesterol test every 5 years, or more often if advised.  Bone density scan (DEXA): At age 50, ask your care team if you should have this scan for osteoporosis (brittle bones).  Hepatitis C: Get tested at least once in your life.  STIs (sexually  transmitted infections)  Before age 24: Ask your care team if you should be screened for STIs.  After age 24: Get screened for STIs if you're at risk. You are at risk for STIs (including HIV) if:  You are sexually active with more than one person.  You don't use condoms every time.  You or a partner was diagnosed with a sexually transmitted infection.  If you are at risk for HIV, ask about PrEP medicine to prevent HIV.  Get tested for HIV at least once in your life, whether you are at risk for HIV or not.  Cancer screening tests  Cervical cancer screening: If you have a cervix, begin getting regular cervical cancer screening tests starting at age 21.  Breast cancer scan (mammogram): If you've ever had breasts, begin having regular mammograms starting at age 40. This is a scan to check for breast cancer.  Colon cancer screening: It is important to start screening for colon cancer at age 45.  Have a colonoscopy test every 10 years (or more often if you're at risk) Or, ask your provider about stool tests like a FIT test every year or Cologuard test every 3 years.  To learn more about your testing options, visit:   .  For help making a decision, visit:   https://bit.ly/wr14644.  Prostate cancer screening test: If you have a prostate, ask your care team if a prostate cancer screening test (PSA) at age 55 is right for you.  Lung cancer screening: If you are a current or former smoker ages 50 to 80, ask your care team if ongoing lung cancer screenings are right for you.  For informational purposes only. Not to replace the advice of your health care provider. Copyright   2023 [facility]. All rights reserved. Clinically reviewed by the [facility] Transitions Program. [employer] [id number] - REV 01/24.  Learning About Stress  What is stress?     Stress is your body's response to a hard situation. Your body can have a physical, emotional, or mental response. Stress is a fact of life for most people, and it  affects everyone differently. What causes stress for you may not be stressful for someone else.  A lot of things can cause stress. You may feel stress when you go on a job interview, take a test, or run a race. This kind of short-term stress is normal and even useful. It can help you if you need to work hard or react quickly. For example, stress can help you finish an important job on time.  Long-term stress is caused by ongoing stressful situations or events. Examples of long-term stress include long-term health problems, ongoing problems at work, or conflicts in your family. Long-term stress can harm your health.  How does stress affect your health?  When you are stressed, your body responds as though you are in danger. It makes hormones that speed up your heart, make you breathe faster, and give you a burst of energy. This is called the fight-or-flight stress response. If the stress is over quickly, your body goes back to normal and no harm is done.  But if stress happens too often or lasts too long, it can have bad effects. Long-term stress can make you more likely to get sick, and it can make symptoms of some diseases worse. If you tense up when you are stressed, you may develop neck, shoulder, or low back pain. Stress is linked to high blood pressure and heart disease.  Stress also harms your emotional health. It can make you quintana, tense, or depressed. Your relationships may suffer, and you may not do well at work or school.  What can you do to manage stress?  You can try these things to help manage stress:   Do something active. Exercise or activity can help reduce stress. Walking is a great way to get started. Even everyday activities such as housecleaning or yard work can help.  Try yoga or betty chi. These techniques combine exercise and meditation. You may need some training at first to learn them.  Do something you enjoy. For example, listen to music or go to a movie. Practice your hobby or do volunteer  "work.  Meditate. This can help you relax, because you are not worrying about what happened before or what may happen in the future.  Do guided imagery. Imagine yourself in any setting that helps you feel calm. You can use online videos, books, or a teacher to guide you.  Do breathing exercises. For example:  From a standing position, bend forward from the waist with your knees slightly bent. Let your arms dangle close to the floor.  Breathe in slowly and deeply as you return to a standing position. Roll up slowly and lift your head last.  Hold your breath for just a few seconds in the standing position.  Breathe out slowly and bend forward from the waist.  Let your feelings out. Talk, laugh, cry, and express anger when you need to. Talking with supportive friends or family, a counselor, or a isabela leader about your feelings is a healthy way to relieve stress. Avoid discussing your feelings with people who make you feel worse.  Write. It may help to write about things that are bothering you. This helps you find out how much stress you feel and what is causing it. When you know this, you can find better ways to cope.  What can you do to prevent stress?  You might try some of these things to help prevent stress:  Manage your time. This helps you find time to do the things you want and need to do.  Get enough sleep. Your body recovers from the stresses of the day while you are sleeping.  Get support. Your family, friends, and community can make a difference in how you experience stress.  Limit your news feed. Avoid or limit time on social media or news that may make you feel stressed.  Do something active. Exercise or activity can help reduce stress. Walking is a great way to get started.  Where can you learn more?  Go to https://www.[employer].net/patiented  Enter N032 in the search box to learn more about \"Learning About Stress.\"  Current as of: October 24, 2023  Content Version: 14.3    2024 [employer]. "   Care instructions adapted under license by your healthcare professional. If you have questions about a medical condition or this instruction, always ask your healthcare professional. [employer] disclaims any warranty or liability for your use of this information.

## 2025-01-16 NOTE — PROGRESS NOTES
"Preventive Care Visit  [facility] [place] Kristen M. Kehr, PA-C, Family Medicine  Jan 16, 2025      Assessment & Plan     Routine general medical examination at a health care facility  Health maintenance reviewed and updated.  Defers immunizations and will return with nursing appointment to update.     Situational anxiety  Increase to 40 mg daily.   Notify if concerns.   - citalopram (CELEXA) 40 MG tablet; Take 1 tablet (40 mg) by mouth daily.    Heart burn  Stable, refills given   - omeprazole (PRILOSEC) 20 MG DR capsule; Take 1 capsule (20 mg) by mouth daily.    Hypertriglyceridemia  Weight loss is helping with lipid management.   No indication for medication at this time.   Check lipid panel today.   - Lipid panel reflex to direct LDL Fasting; Future  - Lipid panel reflex to direct LDL Fasting    Cramp of limb  - Magnesium; Future  - CBC with platelets and differential; Future  - Comprehensive metabolic panel (BMP + Alb, Alk Phos, ALT, AST, Total. Bili, TP); Future  - Magnesium  - CBC with platelets and differential  - Comprehensive metabolic panel (BMP + Alb, Alk Phos, ALT, AST, Total. Bili, TP)    Need for hepatitis C screening test  - Hepatitis C Screen Reflex to HCV RNA Quant and Genotype; Future  - Hepatitis C Screen Reflex to HCV RNA Quant and Genotype    The longitudinal plan of care for the diagnosis(es)/condition(s) as documented were addressed during this visit. Due to the added complexity in care, I will continue to support Deepa in the subsequent management and with ongoing continuity of care.          Nicotine/Tobacco Cessation  She reports that she has been smoking cigarettes. She has a 17.5 pack-year smoking history. She uses smokeless tobacco.  Nicotine/Tobacco Cessation Plan  Information offered: Patient not interested at this time      BMI  Estimated body mass index is 30.13 kg/m  as calculated from the following:    Height as of this encounter: 1.791 m (5' 10.5\").    Weight as of " this encounter: 96.6 kg (213 lb).   Weight management plan: continue with weight loss management provider    Counseling  Appropriate preventive services were addressed with this patient via screening, questionnaire, or discussion as appropriate for fall prevention, nutrition, physical activity, Tobacco-use cessation, social engagement, weight loss and cognition.  Checklist reviewing preventive services available has been given to the patient.  Reviewed patient's diet, addressing concerns and/or questions.   She is at risk for lack of exercise and has been provided with information to increase physical activity for the benefit of her well-being.   She is at risk for psychosocial distress and has been provided with information to reduce risk.           Noelle Arenas is a 54 year old, presenting for the following:  Physical        1/16/2025     8:16 AM   Additional Questions   Roomed by Branden FLOWERS MA        Via the Health Maintenance questionnaire, the patient has reported the following services have been completed -Eye Exam: [employer] 2024-10-01, this information has been sent to the abstraction team.    FREDY Arenas is here today for routine preventative appointment. She will also need refills of medication for the following:    Anxiety: managed with citalopram. She has been having more stressors and more anxiety, she would like to adjust medication if possible. She is currently taking citalopram 30 mg daily.   Heartburn: managed with omeprazole 20 mg daily.   She has cramping in her legs off and on. Concern about magnesium and electrolytes.   She continues to work with [facility] and doing well.         Health Care Directive  Patient does not have a Health Care Directive: Discussed advance care planning with patient; information given to patient to review.      1/13/2025   General Health   How would you rate your overall physical health? Good   Feel stress (tense, anxious, or unable to sleep) To  some extent   (!) STRESS CONCERN      1/13/2025   Nutrition   Three or more servings of calcium each day? Yes   Diet: Low fat/cholesterol    Carbohydrate counting   How many servings of fruit and vegetables per day? (!) 2-3   How many sweetened beverages each day? 0-1       Multiple values from one day are sorted in reverse-chronological order         1/13/2025   Exercise   Days per week of moderate/strenous exercise 3 days   Average minutes spent exercising at this level 40 min         1/13/2025   Social Factors   Frequency of gathering with friends or relatives Three times a week   Worry food won't last until get money to buy more No   Food not last or not have enough money for food? No   Do you have housing? (Housing is defined as stable permanent housing and does not include staying ouside in a car, in a tent, in an abandoned building, in an overnight shelter, or couch-surfing.) Yes   Are you worried about losing your housing? No   Lack of transportation? No   Unable to get utilities (heat,electricity)? No         1/13/2025   Fall Risk   Fallen 2 or more times in the past year? No   Trouble with walking or balance? No          1/13/2025   Dental   Dentist two times every year? Yes         1/13/2025   TB Screening   Were you born outside of the [place]? No         Today's PHQ-2 Score:       1/16/2025     8:15 AM   PHQ-2 ( 1999 [employer])   Q1: Little interest or pleasure in doing things 1   Q2: Feeling down, depressed or hopeless 0   PHQ-2 Score 1    Q1: Little interest or pleasure in doing things Several days   Q2: Feeling down, depressed or hopeless Not at all   PHQ-2 Score 1       Patient-reported           1/13/2025   Substance Use   If I could quit smoking, I would Completely agree   I want to quit somking, worry about health affects Somewhat agree   Willing to make a plan to quit smoking Completely agree   Willing to cut down before quitting Somewhat agree   Alcohol more than 3/day or more than 7/wk No   Do you use  any other substances recreationally? No     Social History     Tobacco Use    Smoking status: Every Day     Current packs/day: 0.50     Average packs/day: 0.5 packs/day for 35.0 years (17.5 ttl pk-yrs)     Types: Cigarettes    Smokeless tobacco: Current    Tobacco comments:     Age 17 to present. Down to 7cigs/d (1/3/2024)   Vaping Use    Vaping status: Never Used   Substance Use Topics    Alcohol use: Yes     Comment: 3-4 drinks per week    Drug use: No           10/24/2024   LAST FHS-7 RESULTS   1st degree relative breast or ovarian cancer No   Any relative bilateral breast cancer No   Any male have breast cancer No   Any ONE woman have BOTH breast AND ovarian cancer No   Any woman with breast cancer before 50yrs No   2 or more relatives with breast AND/OR ovarian cancer No   2 or more relatives with breast AND/OR bowel cancer No        Mammogram Screening - Mammogram every 1-2 years updated in Health Maintenance based on mutual decision making          1/13/2025   One time HIV Screening   Previous HIV test? Yes         1/13/2025   STI Screening   New sexual partner(s) since last STI/HIV test? No     History of abnormal Pap smear: Status post hysterectomy with removal of cervix and no history of CIN2 or greater or cervical cancer. Health Maintenance and Surgical History updated.        Latest Ref Rng & Units 1/31/2018    10:43 AM 1/31/2018     8:40 AM 11/18/2013    12:00 AM   PAP / HPV   PAP (Historical)  NIL   NIL    HPV 16 DNA NEG^Negative  Negative     HPV 18 DNA NEG^Negative  Negative     Other HR HPV NEG^Negative  Negative       ASCVD Risk   The 10-year ASCVD risk score (Sanchez MILES, et al., 2019) is: 3.3%    Values used to calculate the score:      Age: 54 years      Sex: Female      Is Non- : No      Diabetic: No      Tobacco smoker: Yes      Systolic Blood Pressure: 117 mmHg      Is BP treated: No      HDL Cholesterol: 60 mg/dL      Total Cholesterol: 213 mg/dL            Reviewed and updated as needed this visit by Provider   Tobacco  Allergies  Meds  Problems  Med Hx  Surg Hx  Fam Hx            Past Medical History:   Diagnosis Date    Actinic keratosis 2010    porokeratosis R hand    Arthritis     Diarrhea 2015    H. pylori infection 2014    Impaired fasting glucose     Knee pain     Low back pain     chiropractice    Morbid obesity with BMI of 40.0-44.9, adult (H)     Primary osteoarthritis of both hands     Tear of triangular fibrocartilage of left wrist 2018     Past Surgical History:   Procedure Laterality Date    BIOPSY  2015    polyp removed     SECTION  2002     births     SECTION  2006    CHOLECYSTECTOMY  06/15/2016    COLONOSCOPY  2015    COMBINED ESOPHAGOSCOPY, GASTROSCOPY, DUODENOSCOPY (EGD) WITH CO2 INSUFFLATION N/A 2017    Procedure: COMBINED ESOPHAGOSCOPY, GASTROSCOPY, DUODENOSCOPY (EGD) WITH CO2 INSUFFLATION;  EGD-MASS OF NECK/ DELL;  Surgeon: Sohan Renee MD;  Location: [facility]    CYSTECTOMY PILONIDAL  2005    EXCISE MASS NECK Left 2017    Procedure: EXCISE MASS NECK;;  Surgeon: Rayray Morton MD;  Location: [facility]    LAPAROSCOPIC HYSTERECTOMY TOTAL N/A 2019    TLH with BL salpingectomy    LARYNGOSCOPY WITH BIOPSY(IES) N/A 2017    Procedure: LARYNGOSCOPY WITH BIOPSY(IES);  Direct laryngoscopy with biopsy and excision of left neck mass;  Surgeon: Rayray Morton MD;  Location: [facility]    TUBAL LIGATION      WRIST SURGERY Left 2018    TFCC repair, removal of bone chip at [facility] in [place]     OB History    Para Term  AB Living   2 2 2 0 0 2   SAB IAB Ectopic Multiple Live Births   0 0 0 0 2      # Outcome Date GA Lbr Rodger/2nd Weight Sex Type Anes PTL Lv   2 Term 06    M -SEC   DOROTA   1 Term 02    F -SEC   DOROTA     BP Readings from Last 3 Encounters:   25 117/74   24 112/72   24 110/77    Wt Readings from  Last 3 Encounters:   25 96.6 kg (213 lb)   24 95.7 kg (211 lb)   24 96.6 kg (213 lb)                  Patient Active Problem List   Diagnosis    History of dysplastic nevus    Acne vulgaris    CARDIOVASCULAR SCREENING; LDL GOAL LESS THAN 160    Tubular adenoma of colon    Tobacco use disorder    Body mass index (BMI) 33.0-33.9, adult    Hypertriglyceridemia    Gastroesophageal reflux disease, esophagitis presence not specified    Sedentary lifestyle    Menometrorrhagia    MELANI (generalized anxiety disorder)    Situational anxiety    Perimenopause    Heartburn    Morbid obesity with BMI of 40.0-44.9, adult (H)    Primary osteoarthritis of both hands    Low back pain    Knee pain    Impaired fasting glucose    BMI 33.0-33.9,adult    Other insomnia     Past Surgical History:   Procedure Laterality Date    BIOPSY  2015    polyp removed     SECTION       births     SECTION      CHOLECYSTECTOMY  06/15/2016    COLONOSCOPY  2015    COMBINED ESOPHAGOSCOPY, GASTROSCOPY, DUODENOSCOPY (EGD) WITH CO2 INSUFFLATION N/A 2017    Procedure: COMBINED ESOPHAGOSCOPY, GASTROSCOPY, DUODENOSCOPY (EGD) WITH CO2 INSUFFLATION;  EGD-MASS OF NECK/ DELL;  Surgeon: Sohan Renee MD;  Location: [facility]    CYSTECTOMY PILONIDAL  2005    EXCISE MASS NECK Left 2017    Procedure: EXCISE MASS NECK;;  Surgeon: Rayray Morton MD;  Location: [facility]    LAPAROSCOPIC HYSTERECTOMY TOTAL N/A 2019    TLH with BL salpingectomy    LARYNGOSCOPY WITH BIOPSY(IES) N/A 2017    Procedure: LARYNGOSCOPY WITH BIOPSY(IES);  Direct laryngoscopy with biopsy and excision of left neck mass;  Surgeon: Rayray Morton MD;  Location: [facility]    TUBAL LIGATION      WRIST SURGERY Left 2018    TFCC repair, removal of bone chip at [facility] in [place]       Social History     Tobacco Use    Smoking status: Every Day     Current packs/day: 0.50     Average packs/day: 0.5 packs/day for  35.0 years (17.5 ttl pk-yrs)     Types: Cigarettes    Smokeless tobacco: Current    Tobacco comments:     Age 17 to present. Down to 7cigs/d (1/3/2024)   Substance Use Topics    Alcohol use: Yes     Comment: 3-4 drinks per week     Family History   Problem Relation Age of Onset    Macular Degeneration Mother     Arthritis Mother     Lipids Mother     Hypertension Mother     Obesity Mother     Thyroid Disease Mother     Diabetes Mother         Diet controlled    Cardiovascular Father     Hypertension Father     Cerebrovascular Disease Father     Hypertension Brother     Hypertension Brother     Cancer No family hx of     Glaucoma No family hx of     Diabetes No family hx of          Current Outpatient Medications   Medication Sig Dispense Refill    ciclopirox (PENLAC) 8 % external solution Apply topically daily. Apply to affected nails daily.  Remove the residual build up weekly with nail polish remover or an [place] board. 6.6 mL 1    citalopram (CELEXA) 40 MG tablet Take 1 tablet (40 mg) by mouth daily. 90 tablet 3    hydrOXYzine (ATARAX) 10 MG tablet 1-2 tablets at night time for sleep as needed. 90 tablet 1    Multiple Vitamins-Minerals (WOMENS 50+ MULTI VITAMIN/MIN PO)       omeprazole (PRILOSEC) 20 MG DR capsule Take 1 capsule (20 mg) by mouth daily. 90 capsule 3    phentermine (ADIPEX-P) 37.5 MG tablet Take 0.5-1 tablets (18.75-37.5 mg) by mouth every morning (before breakfast). 90 tablet 1    psyllium (METAMUCIL/KONSYL) Packet Take 1 packet by mouth daily      Semaglutide-Weight Management (WEGOVY) 2.4 MG/0.75ML pen Inject 2.4 mg Subcutaneous once a week 9 mL 3    Vitamin D3 (CHOLECALCIFEROL) 25 mcg (1000 units) tablet Take by mouth daily       Allergies   Allergen Reactions    Percodan [Aspirin]     Percolone [Oxycodone]      Per PT had no problems with this medication     Recent Labs   Lab Test 04/04/23  1032 09/27/22  0953 06/10/19  1209 04/03/19  0813 01/31/18  0858 12/20/16  0934   A1C 5.5  --   --   --    "--   --    LDL  --  127*  --   --  104* 103*   HDL  --  60  --   --  58 61   TRIG  --  129  --   --  160* 114   ALT 13  --   --  18  --   --    CR 0.84  --   --   --   --   --    GFRESTIMATED 84  --   --   --   --   --    POTASSIUM 4.8  --   --   --   --   --    TSH 1.44  --  2.10  --   --  1.59          Review of Systems  Constitutional, HEENT, cardiovascular, pulmonary, GI, , musculoskeletal, neuro, skin, endocrine and psych systems are negative, except as otherwise noted.     Objective    Exam  /74   Pulse 87   Temp 97.4  F (36.3  C) (Tympanic)   Resp 16   Ht 1.791 m (5' 10.5\")   Wt 96.6 kg (213 lb)   LMP 05/02/2019 (Approximate)   SpO2 100%   Breastfeeding No   BMI 30.13 kg/m     Estimated body mass index is 30.13 kg/m  as calculated from the following:    Height as of this encounter: 1.791 m (5' 10.5\").    Weight as of this encounter: 96.6 kg (213 lb).    Physical Exam  GENERAL: alert and no distress  EYES: Eyes grossly normal to inspection, PERRL and conjunctivae and sclerae normal  HENT: ear canals and TM's normal, nose and mouth without ulcers or lesions  NECK: no adenopathy, no asymmetry, masses, or scars  RESP: lungs clear to auscultation - no rales, rhonchi or wheezes  BREAST: normal without masses, tenderness or nipple discharge and no palpable axillary masses or adenopathy  CV: regular rate and rhythm, normal S1 S2, no S3 or S4, no murmur, click or rub, no peripheral edema  ABDOMEN: soft, nontender, no hepatosplenomegaly, no masses and bowel sounds normal  MS: no gross musculoskeletal defects noted, no edema  SKIN: no suspicious lesions or rashes  NEURO: Normal strength and tone, mentation intact and speech normal  PSYCH: mentation appears normal, affect normal/bright        Signed Electronically by: Kristen M. Kehr, PA-C    "

## 2025-02-13 ENCOUNTER — TELEPHONE (OUTPATIENT)
Dept: SURGERY | Facility: CLINIC | Age: 55
End: 2025-02-13
Payer: COMMERCIAL

## 2025-02-13 NOTE — TELEPHONE ENCOUNTER
Prior Authorization Retail Medication Request    Medication/Dose: Wegovy 2.4 mg pen injectors weekly  New/renewal/insurance change PA/secondary ins. PA:    Weight on 1/16/2025 with PCP was 213 lbs, BMI 30.13    Insurance   Key: [id number]    Pharmacy Information (if different than what is on RX)  [facility]:  [facility]    [facility] Information  Preferred routing pool for dept communication: Bariatric Surgery Support [facility]

## 2025-02-18 NOTE — TELEPHONE ENCOUNTER
PA Initiation    Medication: WEGOVY 2.4 MG/0.75ML SC SOAJ  Insurance Company: [employer] - Phone [phone] Fax [phone]  Pharmacy Filling the Rx: [id number] - [place] - [facility] [address] AT [facility]  Filling Pharmacy Phone: [phone]  Filling Pharmacy Fax: [phone]  Start Date: 2/18/2025

## 2025-02-18 NOTE — TELEPHONE ENCOUNTER
Prior Authorization Approval    Medication: WEGOVY 2.4 MG/0.75ML SC SOAJ  Authorization Effective Date: 2/18/2025  Authorization Expiration Date: 2/18/2026  Reference #: [id number]   Insurance Company: [employer] - Phone [phone] Fax [phone]  Which Pharmacy is filling the prescription: [id number] - [place], [place] [address] [address] AT [facility]  Pharmacy Notified: YES  Patient Notified: YES

## 2025-05-05 ENCOUNTER — MYC REFILL (OUTPATIENT)
Dept: SURGERY | Facility: CLINIC | Age: 55
End: 2025-05-05
Payer: COMMERCIAL

## 2025-05-05 DIAGNOSIS — E66.813 CLASS 3 SEVERE OBESITY DUE TO EXCESS CALORIES WITHOUT SERIOUS COMORBIDITY WITH BODY MASS INDEX (BMI) OF 40.0 TO 44.9 IN ADULT (H): ICD-10-CM

## 2025-05-05 RX ORDER — SEMAGLUTIDE 2.4 MG/.75ML
2.4 INJECTION, SOLUTION SUBCUTANEOUS WEEKLY
Qty: 9 ML | Refills: 3 | Status: SHIPPED | OUTPATIENT
Start: 2025-05-05

## 2025-05-27 ENCOUNTER — OFFICE VISIT (OUTPATIENT)
Dept: SURGERY | Facility: CLINIC | Age: 55
End: 2025-05-27
Payer: COMMERCIAL

## 2025-05-27 VITALS
BODY MASS INDEX: 30.1 KG/M2 | WEIGHT: 215 LBS | DIASTOLIC BLOOD PRESSURE: 70 MMHG | SYSTOLIC BLOOD PRESSURE: 112 MMHG | HEIGHT: 71 IN

## 2025-05-27 DIAGNOSIS — F17.200 TOBACCO USE DISORDER: ICD-10-CM

## 2025-05-27 DIAGNOSIS — F41.1 GAD (GENERALIZED ANXIETY DISORDER): Chronic | ICD-10-CM

## 2025-05-27 DIAGNOSIS — E66.9 OBESITY (BMI 30-39.9): Primary | ICD-10-CM

## 2025-05-27 PROCEDURE — 99214 OFFICE O/P EST MOD 30 MIN: CPT | Performed by: FAMILY MEDICINE

## 2025-05-27 RX ORDER — BUPROPION HYDROCHLORIDE 150 MG/1
150 TABLET, EXTENDED RELEASE ORAL 2 TIMES DAILY
Qty: 180 TABLET | Refills: 3 | Status: SHIPPED | OUTPATIENT
Start: 2025-05-27 | End: 2026-05-27

## 2025-05-27 NOTE — PROGRESS NOTES
Bariatric Follow-up    54 year old  female BMI:Body mass index is 30.41 kg/m .    Weight:   Wt Readings from Last 1 Encounters:   05/27/25 97.5 kg (215 lb)    pounds      Comorbidities:  Patient Active Problem List   Diagnosis    History of dysplastic nevus    Acne vulgaris    CARDIOVASCULAR SCREENING; LDL GOAL LESS THAN 160    Tubular adenoma of colon    Tobacco use disorder    Body mass index (BMI) 33.0-33.9, adult    Hypertriglyceridemia    Gastroesophageal reflux disease, esophagitis presence not specified    Sedentary lifestyle    Menometrorrhagia    MELANI (generalized anxiety disorder)    Situational anxiety    Perimenopause    Heartburn    Morbid obesity with BMI of 40.0-44.9, adult (H)    Primary osteoarthritis of both hands    Low back pain    Knee pain    Impaired fasting glucose    BMI 33.0-33.9,adult    Other insomnia         Interim: Maintaining a 90# weight loss. Blood pressure is normal. Cholesterol has come down tremendously. Has a smart watch not using. Stress is high with upcoming graduation party and  had prostate cancer removed after initial resection 5 years ago. It is metastatic and he has had chemotherapy.    Plan:  DIET  continue 3 meals, protein and hydration, RD prn   EXERCISE start counting steps when ready for self cares   PHARMACOTHERAPY continue Wegovy, B-12 and D Add Wellbutrin 150mg BID for tobacco cessation and anxiety    discussed wellness and tobacco cessation. She is down to 1/4 ppd and ready to quit after graduation party end of June     -We reviewed her medications and whether associated with weight gain.  Current Outpatient Medications   Medication Sig Dispense Refill    buPROPion (WELLBUTRIN SR) 150 MG 12 hr tablet Take 1 tablet (150 mg) by mouth 2 times daily. 180 tablet 3    citalopram (CELEXA) 40 MG tablet Take 1 tablet (40 mg) by mouth daily. 90 tablet 3    hydrOXYzine (ATARAX) 10 MG tablet 1-2 tablets at night time for sleep as needed. 90 tablet 1    Multiple  Vitamins-Minerals (WOMENS 50+ MULTI VITAMIN/MIN PO)       omeprazole (PRILOSEC) 20 MG DR capsule Take 1 capsule (20 mg) by mouth daily. 90 capsule 3    phentermine (ADIPEX-P) 37.5 MG tablet Take 0.5-1 tablets (18.75-37.5 mg) by mouth every morning (before breakfast). 90 tablet 1    psyllium (METAMUCIL/KONSYL) Packet Take 1 packet by mouth daily      Semaglutide-Weight Management (WEGOVY) 2.4 MG/0.75ML pen Inject 2.4 mg subcutaneously once a week. 9 mL 3    Vitamin D3 (CHOLECALCIFEROL) 25 mcg (1000 units) tablet Take by mouth daily       No current facility-administered medications for this visit.        We discussed [facility] Bariatric Basics including:  -eating 3 meals daily  -eating protein first  -eating slowly, chewing food well  -avoiding/limiting calorie containing beverages  -choosing wheat, not white with breads, crackers, pastas, regan, bagels, tortillas, rice  -limiting restaurant or cafeteria eating to twice a week or less  -We discussed the importance of restorative sleep and stress management in maintaining a healthy weight.  -We discussed insulin resistance and glycemic index as it relates to appetite and weight control  -We discussed the National Weight Control Registry healthy weight maintenance strategies and ways to optimize metabolism.  -We discussed the importance of physical activity including cardiovascular and strength training in maintaining a healthier weight and explored viable options.    Most recent labs:  Recent Labs   Lab Test 01/16/25  0903 09/27/22  0953   CHOL 200* 213*   HDL 69 60   * 127*   TRIG 62 129      Hemoglobin A1C   Date Value Ref Range Status   04/04/2023 5.5 0.0 - 5.6 % Final     Comment:     Normal <5.7%   Prediabetes 5.7-6.4%    Diabetes 6.5% or higher     Note: Adopted from ADA consensus guidelines.      TSH   Date Value Ref Range Status   04/04/2023 1.44 0.30 - 4.20 uIU/mL Final   06/10/2019 2.10 0.40 - 4.00 mU/L Final      BP Readings from Last 3 Encounters:  "  05/27/25 112/70   01/16/25 117/74   12/17/24 112/72          DIETARY HISTORY  Meals Per Day: 3  Eating Protein First?: yes  Food Diary: B:protein drink L:tuna, crackers WW fruit water  fryueD:egg salad with trisquetets, tomato, radishes  Snacks Per Day: rare  Typical Snack: fruit  Fluid Intake: intentional  Portion Control: improved  Calorie Containing Beverages: no  Alcohol per week: no  Typical Protein Food Choices: see above  Choosing Whole Grains: yes  Grocery Shopping is done by: herself  Food Preparation is done by: herself  Meals at Restaurant/Cafeteria/Take Out Per Week:  none  Eating at the Table:   TV is Off During Meals:     Positive Changes Since Last Visit:   Struggling With: stress    Knowledgeable in Reading Food Labels: yes  Getting Adequate Protein: yes  Sleeping 7-8 hours/day yes  Stress management slep, getting throuh it    PHYSICAL ACTIVITY PATTERNS:  Cardiovascular: adls  Strength Training: no    REVIEW OF SYSTEMS    PHYSICAL EXAM:  Vitals: /70 (BP Location: Right arm, Patient Position: Sitting, Cuff Size: Adult Regular)   Ht 1.791 m (5' 10.5\")   Wt 97.5 kg (215 lb)   LMP 05/02/2019 (Approximate)   BMI 30.41 kg/m        GEN: Pleasant, well groomed, in no acute distress  EYES: EOMI,  ENT: airway patent  NECK: no carotid bruits, no anterior/supraclavicular lymphadenopathy, thyroid normal   HEART: Rhythm regular, rate regular, no murmur   LUNGS: Clear  ABDOMEN: soft, non-tender, obese, no rashes   VASCULAR: no  lower extremity edema  MUSCULOSKELETAL:  muscle mass OK  SKIN:  no color changes of venous stasis, no ulcerations    Total time spent on the date of this encounter doing: chart review, review of test results, patient visit, physical exam, education, counseling, developing plan of care, and documenting = 25 minutes.          "

## 2025-05-27 NOTE — LETTER
5/27/2025      Deepa Anderson  [address] [facility] [id number]      Dear Colleague,    Thank you for referring your patient, Deepa Anderson, to the [facility] [place]. Please see a copy of my visit note below.    Bariatric Follow-up    54 year old  female BMI:Body mass index is 30.41 kg/m .    Weight:   Wt Readings from Last 1 Encounters:   05/27/25 97.5 kg (215 lb)    pounds      Comorbidities:  Patient Active Problem List   Diagnosis     History of dysplastic nevus     Acne vulgaris     CARDIOVASCULAR SCREENING; LDL GOAL LESS THAN 160     Tubular adenoma of colon     Tobacco use disorder     Body mass index (BMI) 33.0-33.9, adult     Hypertriglyceridemia     Gastroesophageal reflux disease, esophagitis presence not specified     Sedentary lifestyle     Menometrorrhagia     MELANI (generalized anxiety disorder)     Situational anxiety     Perimenopause     Heartburn     Morbid obesity with BMI of 40.0-44.9, adult (H)     Primary osteoarthritis of both hands     Low back pain     Knee pain     Impaired fasting glucose     BMI 33.0-33.9,adult     Other insomnia         Interim: Maintaining a 90# weight loss. Blood pressure is normal. Cholesterol has come down tremendously. Has a smart watch not using. Stress is high with upcoming graduation party and  had prostate cancer removed after initial resection 5 years ago. It is metastatic and he has had chemotherapy.    Plan:  DIET  continue 3 meals, protein and hydration, RD prn   EXERCISE start counting steps when ready for self cares   PHARMACOTHERAPY continue Wegovy, B-12 and D Add Wellbutrin 150mg BID for tobacco cessation and anxiety    discussed wellness and tobacco cessation. She is down to 1/4 ppd and ready to quit after graduation party end of June     -We reviewed her medications and whether associated with weight gain.  Current Outpatient Medications   Medication Sig Dispense Refill     buPROPion  (WELLBUTRIN SR) 150 MG 12 hr tablet Take 1 tablet (150 mg) by mouth 2 times daily. 180 tablet 3     citalopram (CELEXA) 40 MG tablet Take 1 tablet (40 mg) by mouth daily. 90 tablet 3     hydrOXYzine (ATARAX) 10 MG tablet 1-2 tablets at night time for sleep as needed. 90 tablet 1     Multiple Vitamins-Minerals (WOMENS 50+ MULTI VITAMIN/MIN PO)        omeprazole (PRILOSEC) 20 MG DR capsule Take 1 capsule (20 mg) by mouth daily. 90 capsule 3     phentermine (ADIPEX-P) 37.5 MG tablet Take 0.5-1 tablets (18.75-37.5 mg) by mouth every morning (before breakfast). 90 tablet 1     psyllium (METAMUCIL/KONSYL) Packet Take 1 packet by mouth daily       Semaglutide-Weight Management (WEGOVY) 2.4 MG/0.75ML pen Inject 2.4 mg subcutaneously once a week. 9 mL 3     Vitamin D3 (CHOLECALCIFEROL) 25 mcg (1000 units) tablet Take by mouth daily       No current facility-administered medications for this visit.        We discussed [facility] Bariatric Basics including:  -eating 3 meals daily  -eating protein first  -eating slowly, chewing food well  -avoiding/limiting calorie containing beverages  -choosing wheat, not white with breads, crackers, pastas, regan, bagels, tortillas, rice  -limiting restaurant or cafeteria eating to twice a week or less  -We discussed the importance of restorative sleep and stress management in maintaining a healthy weight.  -We discussed insulin resistance and glycemic index as it relates to appetite and weight control  -We discussed the National Weight Control Registry healthy weight maintenance strategies and ways to optimize metabolism.  -We discussed the importance of physical activity including cardiovascular and strength training in maintaining a healthier weight and explored viable options.    Most recent labs:  Recent Labs   Lab Test 01/16/25  0903 09/27/22  0953   CHOL 200* 213*   HDL 69 60   * 127*   TRIG 62 129      Hemoglobin A1C   Date Value Ref Range Status   04/04/2023 5.5 0.0 - 5.6 %  "Final     Comment:     Normal <5.7%   Prediabetes 5.7-6.4%    Diabetes 6.5% or higher     Note: Adopted from ADA consensus guidelines.      TSH   Date Value Ref Range Status   04/04/2023 1.44 0.30 - 4.20 uIU/mL Final   06/10/2019 2.10 0.40 - 4.00 mU/L Final      BP Readings from Last 3 Encounters:   05/27/25 112/70   01/16/25 117/74   12/17/24 112/72          DIETARY HISTORY  Meals Per Day: 3  Eating Protein First?: yes  Food Diary: B:protein drink L:tuna, crackers WW fruit water  fryueD:egg salad with trisquetets, tomato, radishes  Snacks Per Day: rare  Typical Snack: fruit  Fluid Intake: intentional  Portion Control: improved  Calorie Containing Beverages: no  Alcohol per week: no  Typical Protein Food Choices: see above  Choosing Whole Grains: yes  Grocery Shopping is done by: herself  Food Preparation is done by: herself  Meals at Restaurant/Cafeteria/Take Out Per Week:  none  Eating at the Table:   TV is Off During Meals:     Positive Changes Since Last Visit:   Struggling With: stress    Knowledgeable in Reading Food Labels: yes  Getting Adequate Protein: yes  Sleeping 7-8 hours/day yes  Stress management slep, getting throuh it    PHYSICAL ACTIVITY PATTERNS:  Cardiovascular: adls  Strength Training: no    REVIEW OF SYSTEMS    PHYSICAL EXAM:  Vitals: /70 (BP Location: Right arm, Patient Position: Sitting, Cuff Size: Adult Regular)   Ht 1.791 m (5' 10.5\")   Wt 97.5 kg (215 lb)   LMP 05/02/2019 (Approximate)   BMI 30.41 kg/m        GEN: Pleasant, well groomed, in no acute distress  EYES: EOMI,  ENT: airway patent  NECK: no carotid bruits, no anterior/supraclavicular lymphadenopathy, thyroid normal   HEART: Rhythm regular, rate regular, no murmur   LUNGS: Clear  ABDOMEN: soft, non-tender, obese, no rashes   VASCULAR: no  lower extremity edema  MUSCULOSKELETAL:  muscle mass OK  SKIN:  no color changes of venous stasis, no ulcerations    Total time spent on the date of this encounter doing: chart review, " review of test results, patient visit, physical exam, education, counseling, developing plan of care, and documenting = 25 minutes.            Again, thank you for allowing me to participate in the care of your patient.        Sincerely,        Melisa José MD    Electronically signed

## 2025-06-11 ENCOUNTER — MYC REFILL (OUTPATIENT)
Dept: FAMILY MEDICINE | Facility: CLINIC | Age: 55
End: 2025-06-11
Payer: COMMERCIAL

## 2025-06-11 DIAGNOSIS — F41.8 SITUATIONAL ANXIETY: ICD-10-CM

## 2025-06-11 RX ORDER — CITALOPRAM HYDROBROMIDE 40 MG/1
40 TABLET ORAL DAILY
Qty: 90 TABLET | Refills: 2 | Status: SHIPPED | OUTPATIENT
Start: 2025-06-11

## 2025-08-06 ENCOUNTER — OFFICE VISIT (OUTPATIENT)
Dept: FAMILY MEDICINE | Facility: CLINIC | Age: 55
End: 2025-08-06
Payer: COMMERCIAL

## 2025-08-06 VITALS
OXYGEN SATURATION: 100 % | TEMPERATURE: 97.6 F | HEIGHT: 71 IN | HEART RATE: 71 BPM | RESPIRATION RATE: 16 BRPM | WEIGHT: 214 LBS | DIASTOLIC BLOOD PRESSURE: 73 MMHG | BODY MASS INDEX: 29.96 KG/M2 | SYSTOLIC BLOOD PRESSURE: 110 MMHG

## 2025-08-06 DIAGNOSIS — M25.562 ACUTE PAIN OF LEFT KNEE: ICD-10-CM

## 2025-08-06 DIAGNOSIS — E66.01 MORBID OBESITY WITH BMI OF 40.0-44.9, ADULT (H): ICD-10-CM

## 2025-08-06 DIAGNOSIS — E66.813 CLASS 3 SEVERE OBESITY DUE TO EXCESS CALORIES WITHOUT SERIOUS COMORBIDITY WITH BODY MASS INDEX (BMI) OF 40.0 TO 44.9 IN ADULT (H): ICD-10-CM

## 2025-08-06 DIAGNOSIS — R42 VERTIGO: Primary | ICD-10-CM

## 2025-08-06 PROCEDURE — 99213 OFFICE O/P EST LOW 20 MIN: CPT | Performed by: PHYSICIAN ASSISTANT

## 2025-08-06 RX ORDER — MECLIZINE HYDROCHLORIDE 25 MG/1
25 TABLET ORAL 3 TIMES DAILY PRN
COMMUNITY
Start: 2025-07-17

## 2025-08-06 RX ORDER — ONDANSETRON 4 MG/1
4 TABLET, ORALLY DISINTEGRATING ORAL
COMMUNITY
Start: 2025-07-17

## 2025-08-06 ASSESSMENT — PAIN SCALES - GENERAL: PAINLEVEL_OUTOF10: NO PAIN (0)

## 2025-08-07 ENCOUNTER — ANCILLARY PROCEDURE (OUTPATIENT)
Dept: ULTRASOUND IMAGING | Facility: CLINIC | Age: 55
End: 2025-08-07
Attending: PHYSICIAN ASSISTANT
Payer: COMMERCIAL

## 2025-08-07 ENCOUNTER — PATIENT OUTREACH (OUTPATIENT)
Dept: CARE COORDINATION | Facility: CLINIC | Age: 55
End: 2025-08-07

## 2025-08-07 DIAGNOSIS — M25.562 ACUTE PAIN OF LEFT KNEE: ICD-10-CM

## 2025-08-11 ENCOUNTER — THERAPY VISIT (OUTPATIENT)
Dept: PHYSICAL THERAPY | Facility: CLINIC | Age: 55
End: 2025-08-11
Payer: COMMERCIAL

## 2025-08-11 ENCOUNTER — PATIENT OUTREACH (OUTPATIENT)
Dept: CARE COORDINATION | Facility: CLINIC | Age: 55
End: 2025-08-11

## 2025-08-11 DIAGNOSIS — R42 VERTIGO: ICD-10-CM

## 2025-08-11 DIAGNOSIS — H81.12 BENIGN PAROXYSMAL POSITIONAL VERTIGO, LEFT: Primary | ICD-10-CM

## 2025-08-11 PROCEDURE — 95992 CANALITH REPOSITIONING PROC: CPT | Mod: GP | Performed by: PHYSICAL THERAPIST

## 2025-08-11 PROCEDURE — 97161 PT EVAL LOW COMPLEX 20 MIN: CPT | Mod: GP | Performed by: PHYSICAL THERAPIST

## 2025-08-13 ENCOUNTER — THERAPY VISIT (OUTPATIENT)
Dept: PHYSICAL THERAPY | Facility: CLINIC | Age: 55
End: 2025-08-13
Payer: COMMERCIAL

## 2025-08-13 DIAGNOSIS — H81.12 BENIGN PAROXYSMAL POSITIONAL VERTIGO, LEFT: Primary | ICD-10-CM

## 2025-08-13 PROCEDURE — 95992 CANALITH REPOSITIONING PROC: CPT | Mod: GP | Performed by: PHYSICAL THERAPIST

## 2025-08-13 PROCEDURE — 97112 NEUROMUSCULAR REEDUCATION: CPT | Mod: GP | Performed by: PHYSICAL THERAPIST

## 2025-08-25 SDOH — HEALTH STABILITY: PHYSICAL HEALTH: ON AVERAGE, HOW MANY MINUTES DO YOU ENGAGE IN EXERCISE AT THIS LEVEL?: 30 MIN

## 2025-08-25 SDOH — HEALTH STABILITY: PHYSICAL HEALTH: ON AVERAGE, HOW MANY DAYS PER WEEK DO YOU ENGAGE IN MODERATE TO STRENUOUS EXERCISE (LIKE A BRISK WALK)?: 3 DAYS

## 2025-08-28 ENCOUNTER — OFFICE VISIT (OUTPATIENT)
Dept: ORTHOPEDICS | Facility: CLINIC | Age: 55
End: 2025-08-28
Attending: PHYSICIAN ASSISTANT
Payer: COMMERCIAL

## 2025-08-28 DIAGNOSIS — M25.562 CHRONIC PAIN OF LEFT KNEE: ICD-10-CM

## 2025-08-28 DIAGNOSIS — M17.12 ARTHRITIS OF LEFT KNEE: Primary | ICD-10-CM

## 2025-08-28 DIAGNOSIS — G89.29 CHRONIC PAIN OF LEFT KNEE: ICD-10-CM

## (undated) DEVICE — DRSG PRIMAPORE 03 1/8X6" 66000318

## (undated) DEVICE — SOL WATER IRRIG 1000ML BOTTLE 07139-09

## (undated) DEVICE — PREP SKIN SCRUB TRAY 4461A

## (undated) DEVICE — SPONGE KITTNER 31001010

## (undated) DEVICE — RETR ELASTIC STAYS 3311-1G

## (undated) DEVICE — ANTIFOG SOLUTION W/FOAM PAD CF-1001

## (undated) DEVICE — SU VICRYL 3-0 SH 27" UND J416H

## (undated) DEVICE — SU MONOCRYL 4-0 P-3 18" UND Y494G

## (undated) DEVICE — ESU GROUND PAD ADULT W/CORD E7507

## (undated) DEVICE — SU SILK 2-0 TIE 24" SA75H

## (undated) DEVICE — GLOVE PROTEXIS W/NEU-THERA 8.0  2D73TE80

## (undated) DEVICE — GLOVE PROTEXIS W/NEU-THERA 7.5  2D73TE75

## (undated) DEVICE — DRSG TELFA 3X8" 1238

## (undated) DEVICE — ADHESIVE SWIFTSET 0.8ML OCTYL SS6

## (undated) DEVICE — ESU ELEC BLADE 2.75" COATED/INSULATED E1455

## (undated) DEVICE — PACK MINOR SBA15MIFSE

## (undated) DEVICE — GLOVE PROTEXIS BLUE W/NEU-THERA 8.0  2D73EB80

## (undated) DEVICE — NDL 19GA 1.5"

## (undated) DEVICE — DRAPE SPLIT EENT 76X124" 3X28" 9447